# Patient Record
Sex: FEMALE | Race: BLACK OR AFRICAN AMERICAN | NOT HISPANIC OR LATINO | Employment: FULL TIME | URBAN - METROPOLITAN AREA
[De-identification: names, ages, dates, MRNs, and addresses within clinical notes are randomized per-mention and may not be internally consistent; named-entity substitution may affect disease eponyms.]

---

## 2017-08-02 ENCOUNTER — HOSPITAL ENCOUNTER (EMERGENCY)
Facility: HOSPITAL | Age: 27
Discharge: HOME | End: 2017-08-02
Attending: EMERGENCY MEDICINE
Payer: COMMERCIAL

## 2017-08-02 VITALS
TEMPERATURE: 98 F | DIASTOLIC BLOOD PRESSURE: 77 MMHG | HEIGHT: 67 IN | WEIGHT: 293 LBS | BODY MASS INDEX: 45.99 KG/M2 | RESPIRATION RATE: 18 BRPM | SYSTOLIC BLOOD PRESSURE: 118 MMHG | HEART RATE: 75 BPM | OXYGEN SATURATION: 100 %

## 2017-08-02 DIAGNOSIS — R20.2 PARESTHESIA: Primary | ICD-10-CM

## 2017-08-02 LAB
ALBUMIN SERPL BCP-MCNC: 3.4 G/DL
ALP SERPL-CCNC: 87 U/L
ALT SERPL W/O P-5'-P-CCNC: 16 U/L
ANION GAP SERPL CALC-SCNC: 8 MMOL/L
AST SERPL-CCNC: 22 U/L
B-HCG UR QL: NEGATIVE
BASOPHILS # BLD AUTO: 0.02 K/UL
BASOPHILS NFR BLD: 0.2 %
BILIRUB SERPL-MCNC: 0.3 MG/DL
BUN SERPL-MCNC: 9 MG/DL
CALCIUM SERPL-MCNC: 9.4 MG/DL
CHLORIDE SERPL-SCNC: 107 MMOL/L
CO2 SERPL-SCNC: 23 MMOL/L
CREAT SERPL-MCNC: 0.8 MG/DL
DIFFERENTIAL METHOD: ABNORMAL
EOSINOPHIL # BLD AUTO: 0.1 K/UL
EOSINOPHIL NFR BLD: 0.7 %
ERYTHROCYTE [DISTWIDTH] IN BLOOD BY AUTOMATED COUNT: 16.8 %
EST. GFR  (AFRICAN AMERICAN): >60 ML/MIN/1.73 M^2
EST. GFR  (NON AFRICAN AMERICAN): >60 ML/MIN/1.73 M^2
GLUCOSE SERPL-MCNC: 93 MG/DL
HCT VFR BLD AUTO: 37.4 %
HGB BLD-MCNC: 11.8 G/DL
LYMPHOCYTES # BLD AUTO: 4.6 K/UL
LYMPHOCYTES NFR BLD: 37.2 %
MCH RBC QN AUTO: 22.4 PG
MCHC RBC AUTO-ENTMCNC: 31.6 G/DL
MCV RBC AUTO: 71 FL
MONOCYTES # BLD AUTO: 0.5 K/UL
MONOCYTES NFR BLD: 4.3 %
NEUTROPHILS # BLD AUTO: 7.2 K/UL
NEUTROPHILS NFR BLD: 57.6 %
PLATELET # BLD AUTO: 501 K/UL
PMV BLD AUTO: 9.7 FL
POTASSIUM SERPL-SCNC: 4.2 MMOL/L
PROT SERPL-MCNC: 7.6 G/DL
RBC # BLD AUTO: 5.27 M/UL
SODIUM SERPL-SCNC: 138 MMOL/L
WBC # BLD AUTO: 12.46 K/UL

## 2017-08-02 PROCEDURE — 85025 COMPLETE CBC W/AUTO DIFF WBC: CPT

## 2017-08-02 PROCEDURE — 99284 EMERGENCY DEPT VISIT MOD MDM: CPT

## 2017-08-02 PROCEDURE — 80053 COMPREHEN METABOLIC PANEL: CPT

## 2017-08-02 PROCEDURE — 81025 URINE PREGNANCY TEST: CPT

## 2017-08-02 NOTE — ED PROVIDER NOTES
Encounter Date: 8/2/2017       History     Chief Complaint   Patient presents with    Dizziness     patient feeling dizziness and right side weak     26 year old female with complaint of dizziness yesterday with numbness to right side of body X 2 days.  No weakness.  No fever or chills.  Reports decreased sensation.  NO headache.  No neck pain.  Mild dizziness at present.            Review of patient's allergies indicates:  No Known Allergies  Past Medical History:   Diagnosis Date    Anemia      History reviewed. No pertinent surgical history.  Family History   Problem Relation Age of Onset    Hypertension Father      Social History   Substance Use Topics    Smoking status: Never Smoker    Smokeless tobacco: Never Used    Alcohol use No     Review of Systems   Constitutional: Negative for fever.   HENT: Negative for sore throat.    Respiratory: Negative for shortness of breath.    Cardiovascular: Negative for chest pain.   Gastrointestinal: Negative for nausea.   Genitourinary: Negative for dysuria.   Musculoskeletal: Negative for back pain.   Skin: Negative for rash.   Neurological: Positive for dizziness and numbness. Negative for weakness.   Hematological: Does not bruise/bleed easily.       Physical Exam     Initial Vitals [08/02/17 1017]   BP Pulse Resp Temp SpO2   118/77 75 18 98.4 °F (36.9 °C) 100 %      MAP       90.67         Physical Exam    Nursing note and vitals reviewed.  Constitutional: She appears well-developed and well-nourished.   HENT:   Head: Normocephalic and atraumatic.   Eyes: Conjunctivae and EOM are normal. Pupils are equal, round, and reactive to light.   Neck: Normal range of motion. Neck supple.   Cardiovascular: Normal rate, regular rhythm, normal heart sounds and intact distal pulses.   Pulmonary/Chest: Breath sounds normal.   Abdominal: Soft. There is no tenderness. There is no rebound and no guarding.   Musculoskeletal: Normal range of motion.   Neurological: She is alert and  oriented to person, place, and time. She has normal strength and normal reflexes.   Skin: Skin is warm and dry.   Psychiatric: She has a normal mood and affect. Her behavior is normal. Thought content normal.         ED Course   Procedures  Labs Reviewed   CBC W/ AUTO DIFFERENTIAL - Abnormal; Notable for the following:        Result Value    Hemoglobin 11.8 (*)     MCV 71 (*)     MCH 22.4 (*)     MCHC 31.6 (*)     RDW 16.8 (*)     Platelets 501 (*)     All other components within normal limits   COMPREHENSIVE METABOLIC PANEL - Abnormal; Notable for the following:     Albumin 3.4 (*)     All other components within normal limits   PREGNANCY TEST, URINE RAPID                               ED Course     Clinical Impression:   The encounter diagnosis was Paresthesia.                           Jose Ervin NP  08/02/17 [id number]

## 2017-08-03 ENCOUNTER — HOSPITAL ENCOUNTER (OUTPATIENT)
Dept: RADIOLOGY | Facility: HOSPITAL | Age: 27
Discharge: HOME | End: 2017-08-03
Attending: NURSE PRACTITIONER
Payer: COMMERCIAL

## 2017-08-03 ENCOUNTER — TELEPHONE (OUTPATIENT)
Dept: INTERNAL MEDICINE | Facility: CLINIC | Age: 27
End: 2017-08-03

## 2017-08-03 ENCOUNTER — OFFICE VISIT (OUTPATIENT)
Dept: INTERNAL MEDICINE | Facility: CLINIC | Age: 27
End: 2017-08-03
Payer: COMMERCIAL

## 2017-08-03 ENCOUNTER — TELEPHONE (OUTPATIENT)
Dept: NEUROLOGY | Facility: CLINIC | Age: 27
End: 2017-08-03

## 2017-08-03 VITALS
TEMPERATURE: 97 F | DIASTOLIC BLOOD PRESSURE: 96 MMHG | HEIGHT: 67 IN | WEIGHT: 293 LBS | SYSTOLIC BLOOD PRESSURE: 143 MMHG | HEART RATE: 98 BPM | BODY MASS INDEX: 45.99 KG/M2

## 2017-08-03 DIAGNOSIS — R20.2 PARESTHESIAS: Primary | ICD-10-CM

## 2017-08-03 DIAGNOSIS — R20.2 PARESTHESIAS: ICD-10-CM

## 2017-08-03 PROCEDURE — 72100 X-RAY EXAM L-S SPINE 2/3 VWS: CPT | Mod: 26,,, | Performed by: RADIOLOGY

## 2017-08-03 PROCEDURE — 99999 PR PBB SHADOW E&M-EST. PATIENT-LVL V: CPT | Mod: PBBFAC,,, | Performed by: NURSE PRACTITIONER

## 2017-08-03 PROCEDURE — 99214 OFFICE O/P EST MOD 30 MIN: CPT | Mod: S$GLB,,, | Performed by: NURSE PRACTITIONER

## 2017-08-03 PROCEDURE — 72100 X-RAY EXAM L-S SPINE 2/3 VWS: CPT | Mod: TC,PO

## 2017-08-03 PROCEDURE — 3008F BODY MASS INDEX DOCD: CPT | Mod: S$GLB,,, | Performed by: NURSE PRACTITIONER

## 2017-08-03 PROCEDURE — 72040 X-RAY EXAM NECK SPINE 2-3 VW: CPT | Mod: TC,PO

## 2017-08-03 PROCEDURE — 72040 X-RAY EXAM NECK SPINE 2-3 VW: CPT | Mod: 26,,, | Performed by: RADIOLOGY

## 2017-08-03 PROCEDURE — 72070 X-RAY EXAM THORAC SPINE 2VWS: CPT | Mod: TC,PO

## 2017-08-03 PROCEDURE — 72070 X-RAY EXAM THORAC SPINE 2VWS: CPT | Mod: 26,,, | Performed by: RADIOLOGY

## 2017-08-03 NOTE — TELEPHONE ENCOUNTER
----- Message from Geena Arizmendi sent at 8/3/2017  9:17 AM CDT -----  Contact: Pt   Pt called and stated she was returning a call to the nurse. She can be reached at    [phone].  Thanks,  TF

## 2017-08-03 NOTE — TELEPHONE ENCOUNTER
----- Message from Fany Stone sent at 8/3/2017 12:52 PM CDT -----  Contact: self [phone]  States that she is returning call please call back at [phone]//thank you acc

## 2017-08-03 NOTE — TELEPHONE ENCOUNTER
----- Message from Bridgette Campos sent at 8/3/2017  6:32 AM CDT -----  Contact: self  Patient states went to ER on yesterday experiencing numbness on right side of body.  Patient states unable to feel much. Patient states was advised to see Neurology.   Patient need appointment for today.   Please call pt at [phone]

## 2017-08-03 NOTE — PROGRESS NOTES
Subjective:       Patient ID: Kendy Ambrocio is a 26 y.o. female.    Chief Complaint: Numbness (rt. side of body)    ER follow up Paresthesia right side      Started Tuesday upon awakening. Woke up and entire ride side of body felt like it was asleep. Fell twice since onset of symptoms. Intermittent numbness and tingling. No blurred vision, headache, hx of neuromuscular diseases, heart disease, stroke, acute head, neck or spine issues, fever, sweats, chills, abd pain, n/v/d, rashes, joint pain, urinary symptoms, slurred speech, confusion or any other acute symptoms. CT of head in ER was normal. Labs indicate mild anemia. Not currently taking iron. No new medications. She has to concentrate hard when walking not to fall and to walk in a straight line due to numbness.           Review of Systems   Constitutional: Negative for activity change, appetite change, chills, fatigue, fever and unexpected weight change.   Respiratory: Negative for apnea, cough, choking, chest tightness, shortness of breath, wheezing and stridor.    Cardiovascular: Negative for chest pain, palpitations and leg swelling.   Musculoskeletal: Negative for arthralgias, back pain, gait problem, joint swelling, myalgias, neck pain and neck stiffness.   Neurological: Positive for numbness (right side of body). Negative for dizziness, tremors, seizures, syncope, facial asymmetry, speech difficulty, weakness, light-headedness and headaches.   All other systems reviewed and are negative.      Objective:      Physical Exam   Constitutional:   Obese    Cardiovascular: Normal rate, regular rhythm, normal heart sounds and intact distal pulses.    Pulmonary/Chest: Effort normal and breath sounds normal.   Abdominal: Soft. Bowel sounds are normal.   Musculoskeletal: Normal range of motion. She exhibits no edema, tenderness or deformity.   Difficulty walking from door to chair. But upper and lower ext 5 plus strength when tested independently     Neurological: She is alert. She has normal strength. No cranial nerve deficit or sensory deficit. She displays a negative Romberg sign. GCS eye subscore is 4. GCS verbal subscore is 5. GCS motor subscore is 6.   Skin: Skin is warm and dry.   Psychiatric: She has a normal mood and affect.       Assessment:       1. Paresthesias        Plan:   Paresthesias  Comments:  right sided   Orders:  -     Cancel: MRI Brain With Contrast; Future; Expected date: 08/03/2017  -     Ambulatory consult to Neurology  -     X-Ray Cervical Spine AP And Lateral; Future; Expected date: 08/03/2017  -     X-Ray Thoracic Spine AP Lateral; Future; Expected date: 08/03/2017  -     X-Ray Lumbar Spine Ap And Lateral; Future; Expected date: 08/03/2017  -     Ambulatory consult to Neurology      As above  Refer to neuro  MRI   ER for any acute changes

## 2017-08-03 NOTE — TELEPHONE ENCOUNTER
Called to inform patient of no opening on today.  Needs to speak with patient prior to scheduling with neurology.  Nothing available with them until 9/07/2017

## 2017-08-03 NOTE — TELEPHONE ENCOUNTER
----- Message from Bridgette Campos sent at 8/3/2017  6:32 AM CDT -----  Contact: self  Patient states went to ER on yesterday experiencing numbness on right side of body.  Patient states unable to feel much. Patient states was advised to see Neurology.   Patient need appointment for today.   Please call pt at [phone]

## 2017-08-04 ENCOUNTER — OFFICE VISIT (OUTPATIENT)
Dept: NEUROLOGY | Facility: CLINIC | Age: 27
End: 2017-08-04
Payer: COMMERCIAL

## 2017-08-04 ENCOUNTER — PATIENT MESSAGE (OUTPATIENT)
Dept: NEUROLOGY | Facility: CLINIC | Age: 27
End: 2017-08-04

## 2017-08-04 VITALS
DIASTOLIC BLOOD PRESSURE: 90 MMHG | BODY MASS INDEX: 45.99 KG/M2 | HEIGHT: 67 IN | WEIGHT: 293 LBS | HEART RATE: 80 BPM | SYSTOLIC BLOOD PRESSURE: 128 MMHG

## 2017-08-04 DIAGNOSIS — R20.0 NUMBNESS ON RIGHT SIDE: Primary | ICD-10-CM

## 2017-08-04 DIAGNOSIS — E66.01 MORBID OBESITY WITH BMI OF 50.0-59.9, ADULT: ICD-10-CM

## 2017-08-04 PROCEDURE — 99205 OFFICE O/P NEW HI 60 MIN: CPT | Mod: S$GLB,,, | Performed by: PSYCHIATRY & NEUROLOGY

## 2017-08-04 PROCEDURE — 99999 PR PBB SHADOW E&M-EST. PATIENT-LVL III: CPT | Mod: PBBFAC,,, | Performed by: PSYCHIATRY & NEUROLOGY

## 2017-08-04 PROCEDURE — 3008F BODY MASS INDEX DOCD: CPT | Mod: S$GLB,,, | Performed by: PSYCHIATRY & NEUROLOGY

## 2017-08-04 NOTE — PROGRESS NOTES
This is a 26-year-old right-handed  who states that she awakened on Tuesday, August 1, 2017 with a sensation of numbness that involves primarily the entire right side sparing the face and head.  The patient states that she had loss of feeling in the upper and lower extremity on the right side of the body.  She did not notice any obvious weakness of the right arm or right leg however.  She also denies any change in speech.  She was not aware of any change in vision, denying double vision, blurred vision, or loss of vision in one eye in one part of the visual field.    As the day went by however, she noticed that she was having difficulty with ambulation indicating that she could not walk in a straight line.  She then discovered that she could not drive her automobile because she could not feel her right foot for placement on the accelerator and brake.    The patient and her family did not notice any change in speech.  The patient denies any headache.  She denies any dysphagia.  However, the sensation of numbness has persisted.    The patient denies any prior transient neurological symptoms of any type.  She denies any prior history of vision abnormality, weakness, awkwardness, or clumsiness of the extremities.  She denies any prior change in walking or standing balance.  She denies any prior change in speech.  She denies any change in her bowel and bladder functions.  Patient does have an established history of morbid obesity.  The patient denies any prior history of hypertension or heart disease.      ROS:  GENERAL: No fever, chills, fatigability or unplanned weight loss.  However, the patient has been on a diet and has lost approximately 40 pounds.  SKIN: No rashes, itching or changes in color or texture of skin.  HEAD: No headaches or recent head trauma.  EYES: Visual acuity fine. No photophobia, ocular pain or diplopia.  EARS: Denies ear pain, discharge or vertigo.  NOSE: No loss of  smell, no epistaxis or postnasal drip.  MOUTH & THROAT: No hoarseness or change in voice. No excessive gum bleeding.  NODES: Denies swollen glands.  CHEST: Denies ANN, cyanosis, wheezing, cough and sputum production.  CARDIOVASCULAR: Denies chest pain, PND, orthopnea or reduced exercise tolerance.  ABDOMEN: Appetite fine. No weight loss. Denies diarrhea, abdominal pain, hematemesis or blood in stool.  URINARY: No flank pain, dysuria or hematuria.  PERIPHERAL VASCULAR: No claudication or cyanosis.  MUSCULOSKELETAL: No joint stiffness or swelling. Denies back pain.  NEUROLOGIC: No history of seizures, paralysis, alteration of gait or coordination.  See also present illness.    PAST HISTORY:  Surgery: No surgical procedures  Medical: Morbid obesity (BMI 57.39)  ALLERGIES: NO KNOWN DRUG ALLERGIES    FAMILY HISTORY:  The patient's father had a history of hypertension.  However there is no known family history of early onset stroke or heart disease.  She has a maternal great uncle who had multiple cerebral aneurysms.  A paternal grandmother had Alzheimer-like dementia.  The patient does not have any children of her own.    SOCIAL HISTORY:  The patient is  and lives with her .  She is a nonsmoker.  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    PE:   VITAL SIGNS: Blood pressure 160/100, pulse 80, weight 166.2 kg, height 5 foot 7 inches, BMI 57.39  APPEARANCE: Well nourished, well developed, in no acute distress.    HEAD: Normocephalic, atraumatic.  EYES: PERRL. EOMI.  Non-icteric sclerae.    EARS: TM's intact. Light reflex normal. No retraction or perforation.    NOSE: Mucosa pink. Airway clear.  MOUTH & THROAT: No tonsillar enlargement. No pharyngeal erythema or exudate. No stridor.  NECK: Supple. No bruits.  CHEST: Lungs clear to auscultation.  CARDIOVASCULAR: Regular rhythm without significant murmurs.  ABDOMEN: Bowel sounds normal. Not distended. Soft. No  tenderness or masses.  MUSCULOSKELETAL:  No bony deformity seen.  Muscle tone is normal.  Muscle mass is normal.  NEUROLOGIC:   Mental Status:  The patient is well oriented to person, time, place, and situation.  The patient is attentive to the environment and cooperative for the exam.  Cranial Nerves: II-XII grossly intact.  Visual acuity aided is 20/30 left eye, right eye, and both eyes.  The patient perceives the color red to be the same shade of red with each eye.  Light intensity is perceived to be the same with each eye.  Fundoscopic exam is normal.  No hemorrhage, exudate or papilledema is present.  Visual fields are intact by confrontation.  The extraocular muscles are intact in the cardinal directions of gaze.  No ptosis is present.  The patient reports intact perception of light touch and temperature in all 3 divisions of the fifth cranial nerve bilaterally.  Masseter function is equally strong.  Facial features are symmetrical.  Speech is normal in fluency, diction, and phrasing.  Tongue protrudes in the midline.    Gait and Station:  Romberg is negative.  Good alternate armswing with normal gait.  Motor:  No downdrift of either arm when held at shoulder level.  Manual muscle testing of proximal and distal muscles of both upper and lower extremities is normal.  No focal motor weakness is identified in either upper or either lower extremity.  Sensory: The patient reports subjectively decreased appreciation of temperature, monofilament testing, and pinprick in the right hand, fingers, arm, and lower extremity.  The patient states that it is just subjectively less sensation on the right than on the left.  This includes the major cervical as well as lumbar dermatomal distributions.  Cerebellar:  Finger to nose done well.  Alternating movements intact.  No involuntary movements or tremor seen.  Reflexes:  Stretch reflexes are 1+ both upper and lower extremities.  However, her morbid obesity may be interfering  with reflex determinations.  Plantar stimulation is flexor bilaterally and no pathological reflexes are seen        ASSESSMENT:  1.  Right-sided numbness, etiology unclear but with a very large differential diagnosis including stroke as well as demyelinating disease.  2.  Morbid obesity (BMI 57.39)  3.  Essential hypertension    RECOMMENDATIONS:  1.  Obtain MRI brain without and with contrast.  This study will be critical and if shows evidence of vascular disease, further diagnostic study will be recommended including carotid ultrasound as well as echocardiogram.  On the other hand, if the MRI brain is negative, she will need MRI of the cervical spine as well.  2.  The issue of her morbid obesity was addressed.  The patient is currently dieting with a low carbohydrate type of diet and has been successful in losing about 40 pounds.  The patient was encouraged to continue the low carbohydrate diet and to increase exercise activities.  3.  Further recommendations were made following completion of MRI brain    This note is generated with speech recognition software and is subject to transcription error and sound alike phrases that may be missed by proofreading.

## 2017-08-09 ENCOUNTER — OFFICE VISIT (OUTPATIENT)
Dept: INTERNAL MEDICINE | Facility: CLINIC | Age: 27
End: 2017-08-09
Payer: COMMERCIAL

## 2017-08-09 ENCOUNTER — TELEPHONE (OUTPATIENT)
Dept: INTERNAL MEDICINE | Facility: CLINIC | Age: 27
End: 2017-08-09

## 2017-08-09 ENCOUNTER — LAB VISIT (OUTPATIENT)
Dept: LAB | Facility: HOSPITAL | Age: 27
End: 2017-08-09
Attending: FAMILY MEDICINE
Payer: COMMERCIAL

## 2017-08-09 VITALS
HEIGHT: 67 IN | SYSTOLIC BLOOD PRESSURE: 136 MMHG | DIASTOLIC BLOOD PRESSURE: 78 MMHG | BODY MASS INDEX: 45.99 KG/M2 | TEMPERATURE: 97 F | WEIGHT: 293 LBS | HEART RATE: 89 BPM

## 2017-08-09 DIAGNOSIS — R20.0 NUMBNESS ON RIGHT SIDE: ICD-10-CM

## 2017-08-09 DIAGNOSIS — D64.9 ANEMIA, UNSPECIFIED TYPE: Primary | ICD-10-CM

## 2017-08-09 DIAGNOSIS — D64.9 ANEMIA, UNSPECIFIED TYPE: ICD-10-CM

## 2017-08-09 DIAGNOSIS — E66.01 MORBID OBESITY WITH BMI OF 50.0-59.9, ADULT: ICD-10-CM

## 2017-08-09 PROCEDURE — 3008F BODY MASS INDEX DOCD: CPT | Mod: S$GLB,,, | Performed by: FAMILY MEDICINE

## 2017-08-09 PROCEDURE — 82747 ASSAY OF FOLIC ACID RBC: CPT

## 2017-08-09 PROCEDURE — 82607 VITAMIN B-12: CPT

## 2017-08-09 PROCEDURE — 82728 ASSAY OF FERRITIN: CPT

## 2017-08-09 PROCEDURE — 83540 ASSAY OF IRON: CPT

## 2017-08-09 PROCEDURE — 99999 PR PBB SHADOW E&M-EST. PATIENT-LVL III: CPT | Mod: PBBFAC,,, | Performed by: FAMILY MEDICINE

## 2017-08-09 PROCEDURE — 36415 COLL VENOUS BLD VENIPUNCTURE: CPT | Mod: PO

## 2017-08-09 PROCEDURE — 99213 OFFICE O/P EST LOW 20 MIN: CPT | Mod: S$GLB,,, | Performed by: FAMILY MEDICINE

## 2017-08-09 NOTE — PROGRESS NOTES
Subjective:       Patient ID: Kendy Ambrocio is a 26 y.o. female.    Chief Complaint: Results    MRI:      Discussed with pt MRI results that were prompted after a very acute onset of right sided numbness. MRI demonstrated some signs of possible MS and had been discussed with neurology possible spinal tap      Review of Systems   Constitutional: Positive for activity change. Negative for unexpected weight change.   HENT: Negative for hearing loss, rhinorrhea and trouble swallowing.    Eyes: Negative for discharge and visual disturbance.   Respiratory: Negative for wheezing.    Cardiovascular: Negative for chest pain and palpitations.   Gastrointestinal: Negative for blood in stool, constipation, diarrhea and vomiting.   Endocrine: Negative for polydipsia and polyuria.   Genitourinary: Negative for difficulty urinating, dysuria, hematuria and menstrual problem.   Musculoskeletal: Negative for arthralgias, joint swelling and neck pain.   Neurological: Positive for numbness (right side). Negative for weakness and headaches.   Psychiatric/Behavioral: Negative for confusion and dysphoric mood.       Objective:      Physical Exam   Constitutional: She is oriented to person, place, and time. She appears well-developed and well-nourished.   Cardiovascular: Normal rate and regular rhythm.    Pulmonary/Chest: Effort normal and breath sounds normal.   Neurological: She is alert and oriented to person, place, and time. She has normal strength and normal reflexes. A sensory deficit (right upper and lower extremity) is present. She displays a negative Romberg sign.       Assessment:       1. Anemia, unspecified type    2. Numbness on right side    3. Morbid obesity with BMI of 50.0-59.9, adult        Plan:       Anemia, unspecified type  Comments:  Will do anemic work-up. Pt already take iron tabs. Included B12  Orders:  -     Iron and TIBC; Future; Expected date: 08/09/2017  -     Ferritin; Future; Expected date:  08/09/2017  -     Vitamin B12; Future; Expected date: 08/09/2017  -     Folate RBC; Future; Expected date: 08/09/2017    Numbness on right side  Comments:  Pt is awaiting work form Neurology    Morbid obesity with BMI of 50.0-59.9, adult  Comments:  Discussed with pt about increase exericse and better eating habits

## 2017-08-10 ENCOUNTER — TELEPHONE (OUTPATIENT)
Dept: NEUROLOGY | Facility: CLINIC | Age: 27
End: 2017-08-10

## 2017-08-10 DIAGNOSIS — R20.0 NUMBNESS ON RIGHT SIDE: Primary | ICD-10-CM

## 2017-08-10 LAB
FERRITIN SERPL-MCNC: 25 NG/ML
IRON SERPL-MCNC: 24 UG/DL
SATURATED IRON: 5 %
TOTAL IRON BINDING CAPACITY: 440 UG/DL
TRANSFERRIN SERPL-MCNC: 297 MG/DL
VIT B12 SERPL-MCNC: 618 PG/ML

## 2017-08-10 NOTE — TELEPHONE ENCOUNTER
----- Message from Elizabeth Ward MA sent at 8/7/2017  4:38 PM CDT -----  Contact: pt  Please place orders for pts MRI and spinal tap/thanks  ----- Message -----  From: Pam Rooney  Sent: 8/7/2017   4:26 PM  To: [facility] Staff    States she needs and order to schedule an MRI for her spine and a spinal tap. Please call pt at [phone]. Thank you

## 2017-08-14 LAB — FOLATE RBC-MCNC: 387 NG/ML

## 2017-08-31 ENCOUNTER — PATIENT MESSAGE (OUTPATIENT)
Dept: NEUROLOGY | Facility: CLINIC | Age: 27
End: 2017-08-31

## 2017-08-31 DIAGNOSIS — G35 MULTIPLE SCLEROSIS: Primary | ICD-10-CM

## 2017-09-01 ENCOUNTER — HOSPITAL ENCOUNTER (OUTPATIENT)
Dept: RADIOLOGY | Facility: HOSPITAL | Age: 27
Discharge: HOME | End: 2017-09-01
Attending: PSYCHIATRY & NEUROLOGY
Payer: COMMERCIAL

## 2017-09-01 DIAGNOSIS — R20.0 NUMBNESS ON RIGHT SIDE: ICD-10-CM

## 2017-09-01 LAB
CLARITY CSF: CLEAR
COLOR CSF: COLORLESS
PROT CSF-MCNC: 21 MG/DL
RBC # CSF: 54 /CU MM
SPECIMEN VOL CSF: 5 ML
WBC # CSF: 4 /CU MM

## 2017-09-01 PROCEDURE — 87205 SMEAR GRAM STAIN: CPT

## 2017-09-01 PROCEDURE — 89051 BODY FLUID CELL COUNT: CPT

## 2017-09-01 PROCEDURE — 84157 ASSAY OF PROTEIN OTHER: CPT

## 2017-09-01 PROCEDURE — 62270 DX LMBR SPI PNXR: CPT

## 2017-09-01 PROCEDURE — 83873 ASSAY OF CSF PROTEIN: CPT

## 2017-09-01 PROCEDURE — 87070 CULTURE OTHR SPECIMN AEROBIC: CPT

## 2017-09-01 PROCEDURE — 83916 OLIGOCLONAL BANDS: CPT

## 2017-09-01 NOTE — DISCHARGE SUMMARY
Sterile technique was performed in the lower back, lidocaine was used as a local anesthetic.  OP 9 cm of h2o, 11 cc's of clear csf to lab for eval.  Pt tolerated the procedure well without immediate complications.  Please see radiologist report for details. F/u with PCP and/or ordering physician.

## 2017-09-01 NOTE — TELEPHONE ENCOUNTER
"DR. Monteiro pt is having to cx her MRI today due to weight limit on the machines at the hospital/She will need a OPEN MRI so if you change orders and print them I can send them over to [facility] [address]/ and I"m not sure about the  Spinal tap, I'm sure they can still do that at the hospital right? Pt had asked me? /please advise  "

## 2017-09-05 LAB
ALBUMIN CSF-MCNC: 7.8 MG/DL
ALBUMIN SERPL-MCNC: 3940 MG/DL
IGG CSF-MCNC: 3.9 MG/DL
IGG SERPL-MCNC: 1280 MG/DL
IGG SYNTH RATE SER+CSF CALC-MRATE: 11.57 MG/24 H
IGG/ALB CLEAR SER+CSF-RTO: 1.56
IGG/ALB CSF: 0.5 {RATIO}
IGG/ALB SER: 0.32 {RATIO}
OLIGOCLONAL BANDS CSF ELPH-IMP: 12 BANDS
OLIGOCLONAL BANDS CSF ELPH-IMP: 14 BANDS
OLIGOCLONAL BANDS SERPL: 2 BANDS

## 2017-09-06 ENCOUNTER — OFFICE VISIT (OUTPATIENT)
Dept: NEUROLOGY | Facility: CLINIC | Age: 27
End: 2017-09-06
Payer: COMMERCIAL

## 2017-09-06 VITALS
HEART RATE: 80 BPM | BODY MASS INDEX: 45.99 KG/M2 | WEIGHT: 293 LBS | HEIGHT: 67 IN | DIASTOLIC BLOOD PRESSURE: 82 MMHG | SYSTOLIC BLOOD PRESSURE: 140 MMHG

## 2017-09-06 DIAGNOSIS — G35 MULTIPLE SCLEROSIS: ICD-10-CM

## 2017-09-06 LAB
CMV SPEC QL SHELL VIAL CULT: NO GROWTH
GRAM STN SPEC: NORMAL

## 2017-09-06 PROCEDURE — 99214 OFFICE O/P EST MOD 30 MIN: CPT | Mod: S$GLB,,, | Performed by: PSYCHIATRY & NEUROLOGY

## 2017-09-06 PROCEDURE — 3008F BODY MASS INDEX DOCD: CPT | Mod: S$GLB,,, | Performed by: PSYCHIATRY & NEUROLOGY

## 2017-09-06 PROCEDURE — 99999 PR PBB SHADOW E&M-EST. PATIENT-LVL III: CPT | Mod: PBBFAC,,, | Performed by: PSYCHIATRY & NEUROLOGY

## 2017-09-06 NOTE — PROGRESS NOTES
This is a 26-year-old right-handed  who now has gone through diagnostic testing with her MRI of the brain, and cervical spinal cord showing evidence of demyelinating lesions.  She has had lumbar puncture which is distinctly abnormal with 12 oligoclonal bands and increased IgG synthesis.  It is felt that the comminution of her history as well as laboratory findings are consistent with the diagnosis of relapsing/remitting multiple sclerosis.    The patient returns to clinic today to review the diagnostic studies and to review therapeutic choices.  Discussion was held with the patient and her mother regarding the different modalities of treatment including various immunomodulating therapies.  We discussed oral as well as injectable immunomodulating treatment.  Printed material was also was admitted to the patient or her review, and she was referred to the National Multiple Sclerosis [employer] website also.    The patient had a series of questions that she presented and these were answered in detail to the patient's satisfaction.  The patient indicates that she will review the material and make a decision regarding the immunomodulating treatment she wishes to begin.  The patient also asked about the possibility of a second opinion and this was offered to her through the [facility] system.  Patient states that she will think about this also.    This was a 40 minute visit with the patient and her mother with 100% of the time spent in discussion regarding the laboratory studies as well as discussion regarding immunomodulating therapy.  No formal examination was done at this time.  The patient indicates that she will notify us when she has made a decision about medication and whether she wants a second opinion.    This note is generated with speech recognition software and is subject to transcription error and sound alike phrases that may be missed by proofreading.

## 2017-09-07 ENCOUNTER — TELEPHONE (OUTPATIENT)
Dept: NEUROLOGY | Facility: CLINIC | Age: 27
End: 2017-09-07

## 2017-09-07 ENCOUNTER — PATIENT MESSAGE (OUTPATIENT)
Dept: NEUROLOGY | Facility: CLINIC | Age: 27
End: 2017-09-07

## 2017-09-07 NOTE — TELEPHONE ENCOUNTER
Call returned to pt. Informed her that records will be reviewed by Dr Juan David calle. Should be able to call her next week regarding setting up an appt.

## 2017-09-07 NOTE — TELEPHONE ENCOUNTER
----- Message from Lori Ralph sent at 9/6/2017  6:27 PM CDT -----  Contact: self  Pt is calling in regards to scheduling an appt. Pt was ref by Dr. Thierry Monteiro.     Pt would like a call back at [phone] to schedule this appt.    Thank you.

## 2017-09-08 ENCOUNTER — PATIENT MESSAGE (OUTPATIENT)
Dept: NEUROLOGY | Facility: CLINIC | Age: 27
End: 2017-09-08

## 2017-09-08 RX ORDER — DIMETHYL FUMARATE 120-240 MG
1 KIT ORAL 2 TIMES DAILY
Qty: 60 CAPSULE | Refills: 0 | Status: SHIPPED | OUTPATIENT
Start: 2017-09-08 | End: 2018-02-14

## 2017-09-08 NOTE — TELEPHONE ENCOUNTER
Pt is ready to start tecfidera. I will send her the paper work to sign and get her started/ please advise/

## 2017-09-11 ENCOUNTER — TELEPHONE (OUTPATIENT)
Dept: PHARMACY | Facility: CLINIC | Age: 27
End: 2017-09-11

## 2017-09-11 LAB — MBP CSF-MCNC: <2 MCG/L

## 2017-09-12 ENCOUNTER — TELEPHONE (OUTPATIENT)
Dept: NEUROLOGY | Facility: CLINIC | Age: 27
End: 2017-09-12

## 2017-09-12 NOTE — TELEPHONE ENCOUNTER
----- Message from Lynne Mcintyre sent at 9/12/2017  3:20 PM CDT -----  Contact: Patient [phone]  Patient is returning Amanda's call to schedule an appt. Please call

## 2018-02-14 ENCOUNTER — OFFICE VISIT (OUTPATIENT)
Dept: NEUROLOGY | Facility: CLINIC | Age: 28
End: 2018-02-14
Payer: COMMERCIAL

## 2018-02-14 ENCOUNTER — DOCUMENTATION ONLY (OUTPATIENT)
Dept: NEUROLOGY | Facility: CLINIC | Age: 28
End: 2018-02-14

## 2018-02-14 ENCOUNTER — LAB VISIT (OUTPATIENT)
Dept: LAB | Facility: HOSPITAL | Age: 28
End: 2018-02-14
Payer: COMMERCIAL

## 2018-02-14 VITALS
HEIGHT: 67 IN | BODY MASS INDEX: 45.99 KG/M2 | DIASTOLIC BLOOD PRESSURE: 97 MMHG | SYSTOLIC BLOOD PRESSURE: 160 MMHG | WEIGHT: 293 LBS | HEART RATE: 89 BPM

## 2018-02-14 DIAGNOSIS — R20.9 SENSORY DISTURBANCE: ICD-10-CM

## 2018-02-14 DIAGNOSIS — E55.9 VITAMIN D INSUFFICIENCY: ICD-10-CM

## 2018-02-14 DIAGNOSIS — G35 MULTIPLE SCLEROSIS: Primary | ICD-10-CM

## 2018-02-14 DIAGNOSIS — G35 MULTIPLE SCLEROSIS: ICD-10-CM

## 2018-02-14 DIAGNOSIS — R90.89 ABNORMAL BRAIN MRI: ICD-10-CM

## 2018-02-14 DIAGNOSIS — Z71.89 COUNSELING REGARDING GOALS OF CARE: ICD-10-CM

## 2018-02-14 DIAGNOSIS — E66.01 MORBID OBESITY WITH BMI OF 50.0-59.9, ADULT: ICD-10-CM

## 2018-02-14 LAB
25(OH)D3+25(OH)D2 SERPL-MCNC: 26 NG/ML
ALBUMIN SERPL BCP-MCNC: 3.2 G/DL
ALP SERPL-CCNC: 83 U/L
ALT SERPL W/O P-5'-P-CCNC: 13 U/L
ANION GAP SERPL CALC-SCNC: 9 MMOL/L
AST SERPL-CCNC: 20 U/L
BASOPHILS # BLD AUTO: 0.06 K/UL
BASOPHILS NFR BLD: 0.5 %
BILIRUB SERPL-MCNC: 0.2 MG/DL
BUN SERPL-MCNC: 10 MG/DL
CALCIUM SERPL-MCNC: 8.6 MG/DL
CHLORIDE SERPL-SCNC: 107 MMOL/L
CO2 SERPL-SCNC: 21 MMOL/L
CREAT SERPL-MCNC: 0.8 MG/DL
DIFFERENTIAL METHOD: ABNORMAL
EOSINOPHIL # BLD AUTO: 0.3 K/UL
EOSINOPHIL NFR BLD: 2.1 %
ERYTHROCYTE [DISTWIDTH] IN BLOOD BY AUTOMATED COUNT: 18.1 %
EST. GFR  (AFRICAN AMERICAN): >60 ML/MIN/1.73 M^2
EST. GFR  (NON AFRICAN AMERICAN): >60 ML/MIN/1.73 M^2
GLUCOSE SERPL-MCNC: 82 MG/DL
HCT VFR BLD AUTO: 35.4 %
HGB BLD-MCNC: 10.6 G/DL
IMM GRANULOCYTES # BLD AUTO: 0.04 K/UL
IMM GRANULOCYTES NFR BLD AUTO: 0.3 %
LYMPHOCYTES # BLD AUTO: 4.4 K/UL
LYMPHOCYTES NFR BLD: 34.7 %
MCH RBC QN AUTO: 21.5 PG
MCHC RBC AUTO-ENTMCNC: 29.9 G/DL
MCV RBC AUTO: 72 FL
MONOCYTES # BLD AUTO: 0.6 K/UL
MONOCYTES NFR BLD: 5 %
NEUTROPHILS # BLD AUTO: 7.2 K/UL
NEUTROPHILS NFR BLD: 57.4 %
NRBC BLD-RTO: 0 /100 WBC
PLATELET # BLD AUTO: 465 K/UL
PMV BLD AUTO: 10.1 FL
POTASSIUM SERPL-SCNC: 4 MMOL/L
PROT SERPL-MCNC: 7.2 G/DL
RBC # BLD AUTO: 4.93 M/UL
RHEUMATOID FACT SERPL-ACNC: <10 IU/ML
SODIUM SERPL-SCNC: 137 MMOL/L
WBC # BLD AUTO: 12.58 K/UL

## 2018-02-14 PROCEDURE — 99999 PR PBB SHADOW E&M-EST. PATIENT-LVL III: CPT | Mod: PBBFAC,,, | Performed by: PHYSICIAN ASSISTANT

## 2018-02-14 PROCEDURE — 86360 T CELL ABSOLUTE COUNT/RATIO: CPT

## 2018-02-14 PROCEDURE — 82164 ANGIOTENSIN I ENZYME TEST: CPT

## 2018-02-14 PROCEDURE — 86235 NUCLEAR ANTIGEN ANTIBODY: CPT | Mod: 59

## 2018-02-14 PROCEDURE — 85025 COMPLETE CBC W/AUTO DIFF WBC: CPT

## 2018-02-14 PROCEDURE — 86359 T CELLS TOTAL COUNT: CPT

## 2018-02-14 PROCEDURE — 86431 RHEUMATOID FACTOR QUANT: CPT

## 2018-02-14 PROCEDURE — 99215 OFFICE O/P EST HI 40 MIN: CPT | Mod: S$GLB,,, | Performed by: PHYSICIAN ASSISTANT

## 2018-02-14 PROCEDURE — 86225 DNA ANTIBODY NATIVE: CPT

## 2018-02-14 PROCEDURE — 86255 FLUORESCENT ANTIBODY SCREEN: CPT

## 2018-02-14 PROCEDURE — 86235 NUCLEAR ANTIGEN ANTIBODY: CPT

## 2018-02-14 PROCEDURE — 86618 LYME DISEASE ANTIBODY: CPT

## 2018-02-14 PROCEDURE — 80053 COMPREHEN METABOLIC PANEL: CPT

## 2018-02-14 PROCEDURE — 86147 CARDIOLIPIN ANTIBODY EA IG: CPT

## 2018-02-14 PROCEDURE — 86038 ANTINUCLEAR ANTIBODIES: CPT

## 2018-02-14 PROCEDURE — 99354 PR PROLONGED SVC, OUPT, 1ST HR: CPT | Mod: S$GLB,,, | Performed by: PHYSICIAN ASSISTANT

## 2018-02-14 PROCEDURE — 82306 VITAMIN D 25 HYDROXY: CPT

## 2018-02-14 RX ORDER — CHOLECALCIFEROL (VITAMIN D3) 25 MCG
2000 TABLET ORAL DAILY
COMMUNITY
End: 2019-01-18

## 2018-02-14 RX ORDER — ACETAMINOPHEN AND PHENYLEPHRINE HCL 325; 5 MG/1; MG/1
TABLET ORAL
COMMUNITY

## 2018-02-14 NOTE — PROGRESS NOTES
Fax sent to [facility] medical records dept (attn: Samir) with request for MRI brain on CD ([phone]).

## 2018-02-14 NOTE — PROGRESS NOTES
Neurology Consultation    Reason for consult:  Newly diagnosed MS(8/2017). History provided by patient and chart review(Dr. Monteiro)           History of present illness:       Per Dr. Monteiro:  This is a 26-year-old right-handed  who states that she awakened on Tuesday, August 1, 2017 with a sensation of numbness that involves primarily the entire right side sparing the face and head.  The patient states that she had loss of feeling in the upper and lower extremity on the right side of the body. Also, c/o signficant fatigue.  She did not notice any obvious weakness of the right arm or right leg , she did fall twice however.  She also denies any change in speech.  She was not aware of any change in vision, denying double vision, blurred vision, or loss of vision in one eye in one part of the visual field. Went to ER day after the symptoms started-x-rays and  negative. Sent to see neurologist and Saw Dr. Monteiro and PCP. PCP ordered MRI and saw Dr. Monteiro afterward.     Symptoms last 2 1/2 weeks and slowly resolved. She did not receive any rescue therapy. MRI of Brain was abnormal, LP done and positive MS panel    Patient states that in June of the same year she had L forearm numbness that lasted slightly less than a week.     She received Tecfidera but did not start it.   She has decided to try holistic diet. She has lost about 40 pounds so far   They are considering having a child, and she is concerned about taking medications.     Currently [place] D[place]2,[place]IU[place] and Biotin (10,000 mcg[place] and alpha lopoic acid(Buna).     Will occasionally get menstrual headaches    Review of systems:   CONSTITUTIONAL: Positive for purposeful weight loss, No fever, chills, weakness or fatigue.   HEENT: Eyes: No visual loss, blurred vision, double vision or yellow sclerae. Ears, Nose, Throat: No hearing loss, sneezing, congestion, runny nose or sore throat.   SKIN: No   "History of rashes   CARDIOVASCULAR: No chest pain, chest pressure or chest discomfort. No palpitations or edema.   GENITOURINARY: No dysuria. No sense or urgency/frequency  NEUROLOGICAL: As in HPI   MUSCULOSKELETAL: No muscle, back pain, joint pain or stiffness. Very seldom R calf spasm  HEMATOLOGIC: No anemia, bleeding or bruising.   PSYCHIATRIC: No history of depression or anxiety. Never been on mood medication. States the only time she had counseling was in high school when parent .      Past Medical History:   Diagnosis Date    Anemia        No past surgical history on file.    Family History   Problem Relation Age of Onset    Hypertension Father     No Known Problems Mother        Social History     Social History    Marital status:      Spouse name: N/A    Number of children: N/A    Years of education: N/A     Social History Main Topics    Smoking status: Never Smoker    Smokeless tobacco: Never Used    Alcohol use No    Drug use: No    Sexual activity: Yes     Partners: Male     Other Topics Concern    Not on file     Social History Narrative    No narrative on file       Scheduled Meds:  Continuous Infusions:  PRN Meds:.    Review of patient's allergies indicates:  No Known Allergies      Physical Exam    Vitals:    02/14/18 1049   BP: (!) 160/97   Pulse: 89   Weight: (!) 166.9 kg (368 lb)   Height: 5' 7" (1.702 m)       Timed walk: 3.9s today without assist    MENTAL STATUS: language is fluent, normal verbal comprehension, short-term and remote memory is intact, attention is normal, patient is alert and oriented x 3, fund of knowlege is appropriate by vocabulary.     CRANIAL NERVE EXAM:  There is no ELEONORA.  Extraocular muscles are intact. Pupils are equal, round, and reactive to light. No facial asymmetry. Facial sensation is intact bilaterally. There is no dysarthria. Uvula is midline, and palate moves symmetrically. Shoulder shrug intact bilaterlly. Tongue protrusion is midline. " Hearing is grossly intact. Neck is supple. Acuity corrected 20/20 OD and OS    MOTOR EXAM: Normal bulk and tone throughout UE and LE bilaterally.   No pronator drift; rapid sequential movements are normal; Strength is  5/5 in all groups in the lower extremities and upper extremities;    REFLEXES: 2+ and symmetric throughout UE's, 1+ LE's; toes are down bilaterally. Negative Hsieh's. Negative L'hermittes.     SENSORY EXAM: Normal to light touch, very mild decrease in vibration sense in bilateral LE's o/w normal    COORDINATION: Normal finger-to-nose exam     GAIT: Narrow based and stable    IMAGING (personally reviewed):   No images to view today-patient did not bring disc  Brain MRI report in EPIC:  Findings:    There is motion throughout the study, however important information is obtained    No acute infarct, hemorrhage or mass effect. Pericallosal flair hyperintensities are suggestive of a demyelinating process.  There is subtle enhancement of the largest area in the left parietal lobe, reference series 14 image 20. Ventricles and sulci are proportionate.     No abnormal extra-axial fluid collections.     Flow voids are maintained in the intra-cranial arteries and dural venous sinuses.    Skull base and calvarium have a normal signal.   Impression         Findings is compatible with a demyelinating process such as multiple sclerosis with subtle enhancement of the largest lesion in the left parietal lobe suggestive of active demyelinization      Electronically signed by: ERIC NGUYEN MD  Date: 08/04/17  Time: 13:40          LABS:  Lab Results   Component Value Date    WBC 12.58 02/14/2018    HGB 10.6 (L) 02/14/2018    HCT 35.4 (L) 02/14/2018    MCV 72 (L) 02/14/2018     (H) 02/14/2018     CMP  Sodium   Date Value Ref Range Status   02/14/2018 137 136 - 145 mmol/L Final     Potassium   Date Value Ref Range Status   02/14/2018 4.0 3.5 - 5.1 mmol/L Final     Chloride   Date Value Ref Range Status    02/14/2018 107 95 - 110 mmol/L Final     CO2   Date Value Ref Range Status   02/14/2018 21 (L) 23 - 29 mmol/L Final     Glucose   Date Value Ref Range Status   02/14/2018 82 70 - 110 mg/dL Final     BUN, Bld   Date Value Ref Range Status   02/14/2018 10 6 - 20 mg/dL Final     Creatinine   Date Value Ref Range Status   02/14/2018 0.8 0.5 - 1.4 mg/dL Final     Calcium   Date Value Ref Range Status   02/14/2018 8.6 (L) 8.7 - 10.5 mg/dL Final     Total Protein   Date Value Ref Range Status   02/14/2018 7.2 6.0 - 8.4 g/dL Final     Albumin   Date Value Ref Range Status   02/14/2018 3.2 (L) 3.5 - 5.2 g/dL Final     Total Bilirubin   Date Value Ref Range Status   02/14/2018 0.2 0.1 - 1.0 mg/dL Final     Comment:     For infants and newborns, interpretation of results should be based  on gestational age, weight and in agreement with clinical  observations.  Premature Infant recommended reference ranges:  Up to 24 hours.............<8.0 mg/dL  Up to 48 hours............<12.0 mg/dL  3-5 days..................<15.0 mg/dL  6-29 days.................<15.0 mg/dL       Alkaline Phosphatase   Date Value Ref Range Status   02/14/2018 83 55 - 135 U/L Final     AST   Date Value Ref Range Status   02/14/2018 20 10 - 40 U/L Final     ALT   Date Value Ref Range Status   02/14/2018 13 10 - 44 U/L Final     Anion Gap   Date Value Ref Range Status   02/14/2018 9 8 - 16 mmol/L Final     eGFR if    Date Value Ref Range Status   02/14/2018 >60.0 >60 mL/min/1.73 m^2 Final     eGFR if non    Date Value Ref Range Status   02/14/2018 >60.0 >60 mL/min/1.73 m^2 Final     Comment:     Calculation used to obtain the estimated glomerular filtration  rate (eGFR) is the CKD-EPI equation.        MS Profile   Order: [id number]   Status:  Final result   Visible to patient:  Yes (Patient Portal) Next appt:  None   Notes Recorded by Thierry Monteiro MD on 9/5/2017 at 4:43 PM CDT  The MS profile on the spinal fluid is  abnormal and is consistent with the diagnosis of multiple sclerosis.  We will need to discuss treatment options with you.    Ref Range & Units 5mo ago   CSF Bands bands 14    Olig Bands Interpretation, CSF <4 bands 12     Comments: The oligoclonal band assay detected 4 or more unique IgG   bands in the CSF. This is a positive result.   CSF is used in the diagnosis of MS by identifying increased   intrathecal IgG synthesis qualitatively (Oligoclonal Bands)   or quantitatively (IgG index or IgG synthesis rate, CSF).   Oligoclonal bands (4 or more CSF-specific bands) and/or an   elevated CSF IgG index are detected in up to 90% of   patients with MS. These findings, however, are not specific   for MS as CSF-specific IgG synthesis may also be found in   patients with other neurologic diseases including   infectious, inflammatory, cerebrovascular, and   paraneoplastic disorders.    Serum Bands bands 2    IgG Index, CSF <=0.85 1.56     IgG, CSF <=8.1 mg/dL 3.9    Comments: -------------------ADDITIONAL INFORMATION-------------------   This test has been modified from the 's   instructions. Its performance characteristics were   determined by [facility] in a manner consistent with   CLIA requirements. This test has not been cleared or   approved by the U.S. Food and Drug Administration.    Albumin, CSF <=27.0 mg/dL 7.8    Comments: -------------------ADDITIONAL INFORMATION-------------------   This test has been modified from the 's   instructions. Its performance characteristics were   determined by [facility] in a manner consistent with   CLIA requirements. This test has not been cleared or   approved by the U.S. Food and Drug Administration.    IgG/Albumin Ratio, CSF <=0.21 0.50     IgG Synthetic Rate <=12 mg/24 h 11.57    IgG,S 767 - 1590 mg/dL 1280    Albumin, Serum 3200 - 4800 mg/dL 3940    IgG/Albumin, S <=0.40 0.32    Comments: Test Performed by:   [facility]  [address] [address]    Resulting Agency  [place]   Narrative     Specimen Tube Number->Tube 3      Specimen Collected: 09/01/17 14:48 Last Resulted: 09/05/17 14:48               Vitamin B-12 210 - 950 pg/mL 618       TSH 0.400 - 4.000 uIU/mL 2.612           ASSESSMENT:        1.   Multiple Sclerosis(relapsing remitting)    2.   Mild vibratory sense decrease bilateral LE's     3.   Abnormal MRI by report(no images available)                 DISCUSSION:   Patient presents to establish care with our [facility]. She was diagnosed in August of 2017 after what I suspect was her second event. Unfortunately, she did not bring her MRI disc today; however, I have read the report and appears typical of MS-she will mail disc to us to have uploaded. She did not start DMT(Tecfidera) as she is concerned about pregnancy risk. I do agree that if she is considering pregnancy soon I would prefer to stay with a first line therapy, namely Copaxone. We discussed this in full today and I provided her with written information as well. She and her  will consider and respond via phone/portal. I would like to repeat Brain imaging as she has not been on DMT, and get spine imaging as well(C-spine was not done when ordered previously).        RECOMMENDATIONS:  1. MRI's repeat Brain MRI, baseline C and T spine.           2. Labs for mimics today           3. Recommend starting Copaxone--she will consider and send message via portal(will send to [facility])           4. Continue Vit D3-I suspect we will need to increase dosing after lab results               Multiple sclerosis  -     CBC auto differential; Future; Expected date: 02/14/2018  -     Comprehensive metabolic panel; Future  -     [place]-Suppressor Ratio; Future; Expected date: 02/14/2018  -     SAMANTHA; Future; Expected date: 02/14/2018  -     Angiotensin converting enzyme; Future; Expected date: 02/14/2018  -     Anti Sm/RNP Antibody; Future;  Expected date: 02/14/2018  -     Anti-DNA antibody, double-stranded; Future; Expected date: 02/14/2018  -     Anti-scleroderma antibody; Future; Expected date: 02/14/2018  -     B. burgdorferi Abs (Lyme Disease); Future; Expected date: 02/14/2018  -     Cardiolipin antibody; Future; Expected date: 02/14/2018  -     Rheumatoid factor; Future; Expected date: 02/14/2018  -     Sjogrens syndrome-A extractable nuclear antibody; Future; Expected date: 02/14/2018  -     Sjogrens syndrome-B extractable nuclear antibody; Future; Expected date: 02/14/2018  -     MRI Cervical Spine W WO Cont; Future; Expected date: 02/14/2018  -     MRI Thoracic Spine W WO Cont; Future; Expected date: 02/14/2018  -     Stratify JCV Antibody (with Index); Future; Expected date: 02/14/2018  -     NMO AQUAPORIN-4-IGG, SERUM; Future; Expected date: 02/14/2018  -     Vitamin D; Future  -     MRI Brain W WO Contrast; Future; Expected date: 02/14/2018    Counseling regarding goals of care    Vitamin D insufficiency  -     Vitamin D; Future    Morbid obesity with BMI of 50.0-59.9, adult    Abnormal brain MRI    Sensory disturbance    Over 50% of this 90 minute visit was spent in one on one discussion with patient and  regarding diagnosis, medication management, DMT, symptom management.     Follow-up for follow up with Dr. Fall next available in about 3 months.  Patient agreed to POC today.    Attending, Dr. Fall, was available during today's encounter. Any change to plan along with cosign to appear in the EMR.     Noris Teague PA-C  [facility]

## 2018-02-15 PROBLEM — E55.9 VITAMIN D INSUFFICIENCY: Status: ACTIVE | Noted: 2018-02-15

## 2018-02-15 PROBLEM — Z71.89 COUNSELING REGARDING GOALS OF CARE: Status: ACTIVE | Noted: 2018-02-15

## 2018-02-15 PROBLEM — R90.89 ABNORMAL BRAIN MRI: Status: ACTIVE | Noted: 2018-02-15

## 2018-02-15 LAB
ABSOLUTE CD3: 3172 CELLS/UL (ref 700–2100)
ABSOLUTE CD8: 582 CELLS/UL (ref 200–900)
ACE SERPL-CCNC: 17 U/L
ANA SER QL IF: NORMAL
ANTI SM/RNP ANTIBODY: 0.47 EU
ANTI-SM/RNP INTERPRETATION: NEGATIVE
ANTI-SSA ANTIBODY: 0.6 EU
ANTI-SSA INTERPRETATION: NEGATIVE
ANTI-SSB ANTIBODY: 1.3 EU
ANTI-SSB INTERPRETATION: NEGATIVE
CD3%: 72.1 % (ref 55–83)
CD3+CD4+ CELLS # BLD: 2520 CELLS/UL (ref 300–1400)
CD3+CD4+ CELLS NFR BLD: 57.3 % (ref 28–57)
CD4/CD8 RATIO: 4.33 (ref 0.9–3.6)
CD8 % SUPPRESSOR T CELL: 13.2 % (ref 10–39)
DSDNA AB SER-ACNC: NORMAL [IU]/ML

## 2018-02-16 ENCOUNTER — DOCUMENTATION ONLY (OUTPATIENT)
Dept: NEUROLOGY | Facility: CLINIC | Age: 28
End: 2018-02-16

## 2018-02-16 LAB
B BURGDOR AB SER IA-ACNC: 0.09 INDEX VALUE
CARDIOLIPIN IGG SER IA-ACNC: <9.4 GPL
CARDIOLIPIN IGM SER IA-ACNC: <9.4 MPL
ENA SCL70 AB SER-ACNC: 64 UNITS

## 2018-02-20 ENCOUNTER — PATIENT MESSAGE (OUTPATIENT)
Dept: NEUROLOGY | Facility: CLINIC | Age: 28
End: 2018-02-20

## 2018-02-20 ENCOUNTER — TELEPHONE (OUTPATIENT)
Dept: NEUROLOGY | Facility: CLINIC | Age: 28
End: 2018-02-20

## 2018-02-20 DIAGNOSIS — R89.9 ABNORMAL LABORATORY TEST RESULT: Primary | ICD-10-CM

## 2018-02-21 LAB
JCPYV AB SERPL QL IA: POSITIVE
JCV INDEX: 0.8

## 2018-02-23 LAB — NMO/AQP4 FACS,S: NEGATIVE

## 2018-03-29 ENCOUNTER — TELEPHONE (OUTPATIENT)
Dept: RADIOLOGY | Facility: HOSPITAL | Age: 28
End: 2018-03-29

## 2018-04-05 ENCOUNTER — PATIENT OUTREACH (OUTPATIENT)
Dept: ADMINISTRATIVE | Facility: HOSPITAL | Age: 28
End: 2018-04-05

## 2018-05-23 ENCOUNTER — PATIENT MESSAGE (OUTPATIENT)
Dept: PSYCHIATRY | Facility: CLINIC | Age: 28
End: 2018-05-23

## 2018-06-21 ENCOUNTER — TELEPHONE (OUTPATIENT)
Dept: RADIOLOGY | Facility: HOSPITAL | Age: 28
End: 2018-06-21

## 2018-08-30 ENCOUNTER — TELEPHONE (OUTPATIENT)
Dept: RADIOLOGY | Facility: HOSPITAL | Age: 28
End: 2018-08-30

## 2018-12-20 ENCOUNTER — PATIENT MESSAGE (OUTPATIENT)
Dept: NEUROLOGY | Facility: CLINIC | Age: 28
End: 2018-12-20

## 2019-01-16 ENCOUNTER — HOSPITAL ENCOUNTER (EMERGENCY)
Facility: HOSPITAL | Age: 29
Discharge: HOME | End: 2019-01-16
Attending: EMERGENCY MEDICINE
Payer: COMMERCIAL

## 2019-01-16 ENCOUNTER — PATIENT MESSAGE (OUTPATIENT)
Dept: NEUROLOGY | Facility: CLINIC | Age: 29
End: 2019-01-16

## 2019-01-16 VITALS
DIASTOLIC BLOOD PRESSURE: 88 MMHG | HEART RATE: 80 BPM | TEMPERATURE: 98 F | HEIGHT: 67 IN | OXYGEN SATURATION: 100 % | RESPIRATION RATE: 20 BRPM | WEIGHT: 293 LBS | SYSTOLIC BLOOD PRESSURE: 166 MMHG | BODY MASS INDEX: 45.99 KG/M2

## 2019-01-16 DIAGNOSIS — G35 MULTIPLE SCLEROSIS: Primary | ICD-10-CM

## 2019-01-16 PROBLEM — R47.1 DYSARTHRIA AND ANARTHRIA: Status: ACTIVE | Noted: 2019-01-16

## 2019-01-16 LAB
ALBUMIN SERPL BCP-MCNC: 3.6 G/DL
ALP SERPL-CCNC: 61 U/L
ALT SERPL W/O P-5'-P-CCNC: 17 U/L
ANION GAP SERPL CALC-SCNC: 8 MMOL/L
ANISOCYTOSIS BLD QL SMEAR: ABNORMAL
AST SERPL-CCNC: 21 U/L
BASOPHILS # BLD AUTO: 0.03 K/UL
BASOPHILS NFR BLD: 0.2 %
BILIRUB SERPL-MCNC: 0.3 MG/DL
BILIRUB UR QL STRIP: NEGATIVE
BNP SERPL-MCNC: 19 PG/ML
BUN SERPL-MCNC: 8 MG/DL
BURR CELLS BLD QL SMEAR: ABNORMAL
CALCIUM SERPL-MCNC: 9.2 MG/DL
CHLORIDE SERPL-SCNC: 106 MMOL/L
CK SERPL-CCNC: 258 U/L
CLARITY UR: CLEAR
CO2 SERPL-SCNC: 23 MMOL/L
COLOR UR: YELLOW
CREAT SERPL-MCNC: 0.8 MG/DL
DACRYOCYTES BLD QL SMEAR: ABNORMAL
DIFFERENTIAL METHOD: ABNORMAL
EOSINOPHIL # BLD AUTO: 0.1 K/UL
EOSINOPHIL NFR BLD: 0.7 %
ERYTHROCYTE [DISTWIDTH] IN BLOOD BY AUTOMATED COUNT: 18.3 %
EST. GFR  (AFRICAN AMERICAN): >60 ML/MIN/1.73 M^2
EST. GFR  (NON AFRICAN AMERICAN): >60 ML/MIN/1.73 M^2
GLUCOSE SERPL-MCNC: 85 MG/DL
GLUCOSE UR QL STRIP: NEGATIVE
HCT VFR BLD AUTO: 33.4 %
HGB BLD-MCNC: 10.2 G/DL
HGB UR QL STRIP: NEGATIVE
HYPOCHROMIA BLD QL SMEAR: ABNORMAL
KETONES UR QL STRIP: NEGATIVE
LEUKOCYTE ESTERASE UR QL STRIP: NEGATIVE
LYMPHOCYTES # BLD AUTO: 5.3 K/UL
LYMPHOCYTES NFR BLD: 34.6 %
MCH RBC QN AUTO: 21 PG
MCHC RBC AUTO-ENTMCNC: 30.5 G/DL
MCV RBC AUTO: 69 FL
MONOCYTES # BLD AUTO: 0.7 K/UL
MONOCYTES NFR BLD: 4.8 %
NEUTROPHILS # BLD AUTO: 9.1 K/UL
NEUTROPHILS NFR BLD: 59.7 %
NITRITE UR QL STRIP: NEGATIVE
OVALOCYTES BLD QL SMEAR: ABNORMAL
PH UR STRIP: 6 [PH] (ref 5–8)
PLATELET # BLD AUTO: 474 K/UL
PMV BLD AUTO: 9.7 FL
POCT GLUCOSE: 79 MG/DL (ref 70–110)
POIKILOCYTOSIS BLD QL SMEAR: ABNORMAL
POTASSIUM SERPL-SCNC: 3.8 MMOL/L
PROT SERPL-MCNC: 7.3 G/DL
PROT UR QL STRIP: NEGATIVE
RBC # BLD AUTO: 4.85 M/UL
SODIUM SERPL-SCNC: 137 MMOL/L
SP GR UR STRIP: 1.01 (ref 1–1.03)
STOMATOCYTES BLD QL SMEAR: PRESENT
TARGETS BLD QL SMEAR: ABNORMAL
TROPONIN I SERPL DL<=0.01 NG/ML-MCNC: <0.006 NG/ML
URN SPEC COLLECT METH UR: NORMAL
UROBILINOGEN UR STRIP-ACNC: NEGATIVE EU/DL
WBC # BLD AUTO: 15.16 K/UL

## 2019-01-16 PROCEDURE — 93005 ELECTROCARDIOGRAM TRACING: CPT

## 2019-01-16 PROCEDURE — 81003 URINALYSIS AUTO W/O SCOPE: CPT

## 2019-01-16 PROCEDURE — 85025 COMPLETE CBC W/AUTO DIFF WBC: CPT

## 2019-01-16 PROCEDURE — 36415 COLL VENOUS BLD VENIPUNCTURE: CPT

## 2019-01-16 PROCEDURE — 82962 GLUCOSE BLOOD TEST: CPT

## 2019-01-16 PROCEDURE — 84484 ASSAY OF TROPONIN QUANT: CPT

## 2019-01-16 PROCEDURE — 99244 PR OFFICE CONSULTATION,LEVEL IV: ICD-10-PCS | Mod: GT,,, | Performed by: PSYCHIATRY & NEUROLOGY

## 2019-01-16 PROCEDURE — 80053 COMPREHEN METABOLIC PANEL: CPT

## 2019-01-16 PROCEDURE — 82550 ASSAY OF CK (CPK): CPT

## 2019-01-16 PROCEDURE — 93010 EKG 12-LEAD: ICD-10-PCS | Mod: ,,, | Performed by: INTERNAL MEDICINE

## 2019-01-16 PROCEDURE — 83880 ASSAY OF NATRIURETIC PEPTIDE: CPT

## 2019-01-16 PROCEDURE — 99285 EMERGENCY DEPT VISIT HI MDM: CPT | Mod: 25

## 2019-01-16 PROCEDURE — 99244 OFF/OP CNSLTJ NEW/EST MOD 40: CPT | Mod: GT,,, | Performed by: PSYCHIATRY & NEUROLOGY

## 2019-01-16 PROCEDURE — 93010 ELECTROCARDIOGRAM REPORT: CPT | Mod: ,,, | Performed by: INTERNAL MEDICINE

## 2019-01-16 RX ORDER — ALPHA LIPOIC ACID 100 MG
CAPSULE ORAL
COMMUNITY

## 2019-01-16 RX ORDER — ACETAMINOPHEN 500 MG
10000 TABLET ORAL DAILY
COMMUNITY

## 2019-01-16 NOTE — HPI
27 y/o female with 2 year history of MS was in [facility] until 0930 when she developed right arm and leg heaviness and dysarthria. Per mother, this has improved markedly since onset. No previous history of transient neurological symptoms.

## 2019-01-16 NOTE — CONSULTS
[facility]  Vascular Neurology  Comprehensive Stroke Center  Tele-Consultation Note      Consults    Consulting Provider: Spoke Physician:: Donovan Flores  Current Providers  No providers found    Patient Location: [facility]  Spoke hospital nurse at bedside with patient assisting consultant.     Patient information was obtained from patient, spouse/SO and parent.       Assessment/Plan:     STROKE DOCUMENTATION     Acute Stroke Times:   Acute Stroke Times   Last Known Normal Date: 01/16/19  Last Known Normal Time: 0930  Symptom Onset Date: 01/16/19  Symptom Onset Time: 0930  Stroke Team Called Date: 01/16/19  Stroke Team Called Time: 1514  Stroke Team Arrival Date: 01/16/19  Stroke Team Arrival Time: 1516  CT Interpretation Time: 1540    NIH Scale:  Interval: baseline  1a. Level of Consciousness: 0-->Alert, keenly responsive  1b. LOC Questions: 0-->Answers both questions correctly  1c. LOC Commands: 0-->Performs both tasks correctly  2. Best Gaze: 0-->Normal  3. Visual: 0-->No visual loss  4. Facial Palsy: 0-->Normal symmetrical movements  5a. Motor Arm, Left: 0-->No drift, limb holds 90 (or 45) degrees for full 10 secs  5b. Motor Arm, Right: 0-->No drift, limb holds 90 (or 45) degrees for full 10 secs  6a. Motor Leg, Left: 0-->No drift, leg holds 30 degree position for full 5 secs  6b. Motor Leg, Right: 0-->No drift, leg holds 30 degree position for full 5 secs  7. Limb Ataxia: 0-->Absent  8. Sensory: 0-->Normal, no sensory loss  9. Best Language: 0-->No aphasia, normal  10. Dysarthria: 1-->Mild-to-moderate dysarthria, patient slurs at least some words and, at worst, can be understood with some difficulty  11. Extinction and Inattention (formerly Neglect): 0-->No abnormality  Total (NIH Stroke Scale): 1     Modified Jo Score: 0  Haley Coma Scale:    ABCD2 Score:    [id number] Score:   HAS -BLED Score:   ICH Score:   [employer]  "Classification:       Diagnoses:   Dysarthria and anarthria    Dysarthria and anarthria  Can not exclude stroke but the only finding at this time is dysarthria and the patient's history of MS makes that the most likely etiology. Get and MRI of brain with and without to assess for acute MS vs Stroke. Do not transfer and tPA is contraindicated         Blood pressure (!) 189/97, pulse 99, temperature 98 °F (36.7 °C), temperature source Oral, resp. rate 18, height 5' 7" (1.702 m), weight (!) 179.2 kg (395 lb 1 oz), SpO2 100 %.  Alteplase Eligible?: No  Alteplase Recommendation: Alteplase not recommended due to Outside of treatment window  and Suspected stroke mimic   Possible Interventional Revascularization Candidate? No; No ischemic penumbra    Disposition Recommendation: pending further studies  admit to inpatient  do not transfer      Subjective:     History of Present Illness:  29 y/o female with 2 year history of MS [place] [facility] [place] she developed right arm and leg heaviness and dysarthria. Per mother, this has improved markedly since onset. No previous history of transient neurological symptoms.      Woke up with symptoms?: no  Last known normal: Last Known Normal Date: 01/16/19 Last Known Normal Time: 0930    Recent bleeding noted: no  Does the patient take any Blood Thinners? no  Medications: No relevant medications      Past Medical History: hypertension and MS    Past Surgical History: no relevant surgical history    Family History: no relevant history    Social History: no smoking, no drinking, no drugs    Allergies: No Known Allergies     Review of Systems   Neurological: Positive for speech difficulty and weakness.   All other systems reviewed and are negative.    Objective:   Vitals: Blood pressure (!) 189/97, pulse 99, temperature 98 °F (36.7 °C), temperature source Oral, resp. rate 18, height 5' 7" (1.702 m), weight (!) 179.2 kg (395 lb 1 oz), SpO2 100 %.     CT READ: Yes  No hemmorhage. No " mass effect. No early infarct signs.     Physical Exam   Constitutional: She is oriented to person, place, and time. She appears well-developed and well-nourished.   HENT:   Head: Normocephalic and atraumatic.   Eyes: EOM are normal. Pupils are equal, round, and reactive to light.   Cardiovascular: Normal rate and regular rhythm.   Pulmonary/Chest: Effort normal.   Neurological: She is alert and oriented to person, place, and time.   Vitals reviewed.            Recommended the emergency room physician to have a brief discussion with the patient and/or family if available regarding the risks and benefits of treatment, and to briefly document the occurrence of that discussion in his clinical encounter note.     The attending portion of this evaluation, treatment, and documentation was performed per Radha Carbajal MD via audiovisual.    Billing code:  (non-intervention mild to moderate stroke, TIA, some mimics)    · This patient has a critical neurological condition/illness, with some potential for high morbidity and mortality.  · There is a moderate probability for acute neurological change leading to clinical and possibly life-threatening deterioration requiring highest level of physician preparedness for urgent intervention.  · Care was coordinated with other physicians involved in the patient's care.  · Radiologic studies and laboratory data were reviewed and interpreted, and plan of care was re-assessed based on the results.  · Diagnosis, treatment options and prognosis may have been discussed with the patient and/or family members or caregiver.      In your opinion, this was a: Tier 2    Consult End Time: 2739     Radha Carbajal MD  [facility]  Vascular Neurology   [facility]

## 2019-01-16 NOTE — TELEPHONE ENCOUNTER
"Returned call. Pt's mom reporting symptoms as pt's speech is extremely slurred. She said began at 9AM this morning. Pt's entire tongue became numb and her speech is extremely slurred. Pt reports not being able to write with her right hand, which is new, and started a few of hours ago. When asked if pt is experiencing weakness in any other part of her body, she responds, "she says she doesn't feel weak, just feels funny." Strongly advised pt's mom to get patient to the ER asap. Pt's mom verbalized understanding.   "

## 2019-01-16 NOTE — TELEPHONE ENCOUNTER
----- Message from Sheryl Blackmon sent at 1/16/2019  1:19 PM CST -----  Contact: Pt  Pt is requesting a call back due to pt having a flare up. Pt is very concerned and would like to speak with someone as soon as possible.       Pt can be contacted t [phone]

## 2019-01-16 NOTE — ASSESSMENT & PLAN NOTE
Dysarthria and anarthria  Can not exclude stroke but the only finding at this time is dysarthria and the patient's history of MS makes that the most likely etiology. Get and MRI of brain with and without to assess for acute MS vs Stroke. Do not transfer and tPA is contraindicated

## 2019-01-16 NOTE — ED NOTES
Pt states she began with difficulty speaking this morning and having heaviness to RUE.  Symptoms have greatly improved as of now.

## 2019-01-16 NOTE — ED PROVIDER NOTES
"SCRIBE #1 NOTE: I, Ftaimah Davis, am scribing for, and in the presence of, Donovan Flores MD. I have scribed the entire note.       History     Chief Complaint   Patient presents with    Aphasia     Pt reports Dx with MS 1 year ago, pt c/o sudden onset of slurred speech, "tongue heaviness," and "heaviness" of RUE.      Review of patient's allergies indicates:  No Known Allergies      History of Present Illness     HPI    1/16/2019, 3:07 PM  History obtained from the patient      History of Present Illness: Kendy Ryder is a 28 y.o. female patient with a PMHx of MS who presents to the Emergency Department for evaluation of difficulty speaking which onset suddenly PTA. Pt states her tongue feels heavy. Her family states pt's speech has improved upon arrival. Symptoms are constant and moderate in severity. No mitigating or exacerbating factors reported. Associated sxs include R-sided heaviness. Patient denies any fever, chills, n/v/d, abd pain, CP, SOB, weakness, numbness, blurry vision, facial asymmetry, and all other sxs at this time. No further complaints or concerns at this time.       Arrival mode: Personal vehicle      PCP: Corey Denis MD        Past Medical History:  Past Medical History:   Diagnosis Date    Anemia     Multiple sclerosis        Past Surgical History:  Past Surgical History:   Procedure Laterality Date    DILATION AND CURETTAGE OF UTERUS           Family History:  Family History   Problem Relation Age of Onset    Hypertension Father     No Known Problems Mother        Social History:  Social History     Tobacco Use    Smoking status: Never Smoker    Smokeless tobacco: Never Used   Substance and Sexual Activity    Alcohol use: No    Drug use: No    Sexual activity: Yes     Partners: Male        Review of Systems     Review of Systems   Constitutional: Negative for chills, diaphoresis and fever.   HENT: Negative for congestion, rhinorrhea and sore throat.    Eyes:        " (-) blurry vision   Respiratory: Negative for cough and shortness of breath.    Cardiovascular: Negative for chest pain.   Gastrointestinal: Negative for abdominal pain, diarrhea, nausea and vomiting.   Genitourinary: Negative for dysuria, frequency and hematuria.   Musculoskeletal: Negative for back pain and neck pain.   Skin: Negative for rash.   Neurological: Positive for speech difficulty (Tongue heaviness). Negative for dizziness, weakness, numbness and headaches.        (+) R-sided heaviness   Hematological: Does not bruise/bleed easily.   All other systems reviewed and are negative.       Physical Exam     Initial Vitals [01/16/19 1457]   BP Pulse Resp Temp SpO2   (!) 189/97 99 18 98 °F (36.7 °C) 100 %      MAP       --          Physical Exam  Nursing Notes and Vital Signs Reviewed.  Constitutional: Patient is in mild distress. Well-developed and well-nourished.  Head: Atraumatic. Normocephalic.  Eyes: PERRL. EOM intact. Conjunctivae are not pale. No scleral icterus.  ENT: Mucous membranes are moist. Oropharynx is clear and symmetric.    Neck: Supple. Full ROM. No lymphadenopathy.  Cardiovascular: Regular rate. Regular rhythm. No murmurs, rubs, or gallops. Distal pulses are 2+ and symmetric.  Pulmonary/Chest: No respiratory distress. Clear to auscultation bilaterally. No wheezing or rales.  Abdominal: Soft and non-distended.  There is no tenderness.  No rebound, guarding, or rigidity.   Musculoskeletal: Moves all extremities. No obvious deformities. No edema.  Skin: Warm and dry.  Neurological: Patient is alert and oriented to person, place and time. Pupils ERRL and EOM normal. Cranial nerves II-XII are intact. Strength is full bilaterally; it is equal and 5/5 in bilateral upper and lower extremities. There is no pronator drift of outstretched arms. Light touch sense is intact. Speech is slow and slurred.   Psychiatric: Normal affect. Good eye contact. Appropriate in content.     ED Course   Procedures  ED  "Vital Signs:  Vitals:    01/16/19 1457 01/16/19 1552 01/16/19 1737   BP: (!) 189/97  (!) 166/88   Pulse: 99 84 80   Resp: 18  20   Temp: 98 °F (36.7 °C)  98.3 °F (36.8 °C)   TempSrc: Oral     SpO2: 100%  100%   Weight: (!) 179.2 kg (395 lb 1 oz)     Height: 5' 7" (1.702 m)         Abnormal Lab Results:  Labs Reviewed   CBC W/ AUTO DIFFERENTIAL - Abnormal; Notable for the following components:       Result Value    WBC 15.16 (*)     Hemoglobin 10.2 (*)     Hematocrit 33.4 (*)     MCV 69 (*)     MCH 21.0 (*)     MCHC 30.5 (*)     RDW 18.3 (*)     Platelets 474 (*)     Gran # (ANC) 9.1 (*)     Lymph # 5.3 (*)     All other components within normal limits   CK - Abnormal; Notable for the following components:     (*)     All other components within normal limits   COMPREHENSIVE METABOLIC PANEL   URINALYSIS, REFLEX TO URINE CULTURE    Narrative:     Preferred Collection Type->Urine, Clean Catch   B-TYPE NATRIURETIC PEPTIDE   TROPONIN I   POCT GLUCOSE   POCT GLUCOSE MONITORING CONTINUOUS        All Lab Results:  Results for orders placed or performed during the hospital encounter of 01/16/19   CBC auto differential   Result Value Ref Range    WBC 15.16 (H) 3.90 - 12.70 K/uL    RBC 4.85 4.00 - 5.40 M/uL    Hemoglobin 10.2 (L) 12.0 - 16.0 g/dL    Hematocrit 33.4 (L) 37.0 - 48.5 %    MCV 69 (L) 82 - 98 fL    MCH 21.0 (L) 27.0 - 31.0 pg    MCHC 30.5 (L) 32.0 - 36.0 g/dL    RDW 18.3 (H) 11.5 - 14.5 %    Platelets 474 (H) 150 - 350 K/uL    MPV 9.7 9.2 - 12.9 fL    Gran # (ANC) 9.1 (H) 1.8 - 7.7 K/uL    Lymph # 5.3 (H) 1.0 - 4.8 K/uL    Mono # 0.7 0.3 - 1.0 K/uL    Eos # 0.1 0.0 - 0.5 K/uL    Baso # 0.03 0.00 - 0.20 K/uL    Gran% 59.7 38.0 - 73.0 %    Lymph% 34.6 18.0 - 48.0 %    Mono% 4.8 4.0 - 15.0 %    Eosinophil% 0.7 0.0 - 8.0 %    Basophil% 0.2 0.0 - 1.9 %    Aniso Moderate     Poik Moderate     Hypo Occasional     Ovalocytes Occasional     Target Cells Occasional     Tear Drop Cells Occasional     Archer Cells " Occasional     Stomatocytes Present     Differential Method Automated    Comprehensive metabolic panel   Result Value Ref Range    Sodium 137 136 - 145 mmol/L    Potassium 3.8 3.5 - 5.1 mmol/L    Chloride 106 95 - 110 mmol/L    CO2 23 23 - 29 mmol/L    Glucose 85 70 - 110 mg/dL    BUN, Bld 8 6 - 20 mg/dL    Creatinine 0.8 0.5 - 1.4 mg/dL    Calcium 9.2 8.7 - 10.5 mg/dL    Total Protein 7.3 6.0 - 8.4 g/dL    Albumin 3.6 3.5 - 5.2 g/dL    Total Bilirubin 0.3 0.1 - 1.0 mg/dL    Alkaline Phosphatase 61 55 - 135 U/L    AST 21 10 - 40 U/L    ALT 17 10 - 44 U/L    Anion Gap 8 8 - 16 mmol/L    eGFR if African American >60 >60 mL/min/1.73 m^2    eGFR if non African American >60 >60 mL/min/1.73 m^2   Urinalysis, Reflex to Urine Culture Urine, Clean Catch   Result Value Ref Range    Specimen UA Urine, Clean Catch     Color, UA Yellow Yellow, Straw, Steffi    Appearance, UA Clear Clear    pH, UA 6.0 5.0 - 8.0    Specific Gravity, UA 1.015 1.005 - 1.030    Protein, UA Negative Negative    Glucose, UA Negative Negative    Ketones, UA Negative Negative    Bilirubin (UA) Negative Negative    Occult Blood UA Negative Negative    Nitrite, UA Negative Negative    Urobilinogen, UA Negative <2.0 EU/dL    Leukocytes, UA Negative Negative   Brain natriuretic peptide   Result Value Ref Range    BNP 19 0 - 99 pg/mL   CK   Result Value Ref Range     (H) 20 - 180 U/L   Troponin I   Result Value Ref Range    Troponin I <0.006 0.000 - 0.026 ng/mL   POCT glucose   Result Value Ref Range    POCT Glucose 79 70 - 110 mg/dL       Imaging Results:  Imaging Results          X-Ray Chest AP Portable (Final result)  Result time 01/16/19 16:12:53    Final result by Theo Snell MD (01/16/19 16:12:53)                 Impression:      No acute process seen.      Electronically signed by: Theo Snell MD  Date:    01/16/2019  Time:    16:12             Narrative:    EXAMINATION:  XR CHEST AP PORTABLE    CLINICAL HISTORY:  slurred  speech;    FINDINGS:  Single view of the chest.    Cardiac silhouette is normal.  The lungs demonstrate no evidence of active disease.  No evidence of pleural effusion or pneumothorax.  Bones appear intact.                               CT Head Without Contrast (Final result)  Result time 01/16/19 15:23:43    Final result by Theo Snell MD (01/16/19 15:23:43)                 Impression:      No acute abnormality. If there is additional clinical concern for acute infarct, MRI with diffusion weighted imaging recommended.    COMMUNICATION  This critical result was discovered/received at 15:20 hours.  The critical information above was relayed directly by me by telephone to Dr. Flores on 01/16/2019 at 15:23 hours.    All CT scans at this facility use dose modulation, iterative reconstruction, and/or weight based dosing when appropriate to reduce radiation dose to as low as reasonable achievable.      Electronically signed by: Theo Snell MD  Date:    01/16/2019  Time:    15:23             Narrative:    EXAMINATION:  CT HEAD WITHOUT CONTRAST    CLINICAL HISTORY:  Stroke;slurred speech;    TECHNIQUE:  Low dose axial CT images obtained throughout the head without intravenous contrast. Sagittal and coronal reconstructions were performed.    All CT scans at this facility use dose modulation, iterative reconstruction, and/or weight based dosing when appropriate to reduce radiation dose to as low as reasonable achievable.    COMPARISON:  08/02/2017    FINDINGS:  Intracranial compartment:    The brain parenchyma appears normal. No parenchymal mass, hemorrhage, edema or major vascular distribution infarct.    Ventricles and sulci are normal in size for age without evidence of hydrocephalus.    No extra-axial blood or fluid collections.    Skull/extracranial contents (limited evaluation): No fracture. Mastoid air cells and paranasal sinuses are essentially clear.                                 The EKG was ordered,  reviewed, and independently interpreted by the ED provider.  Interpretation time: 15:44  Rate: 82 BPM  Rhythm: normal sinus rhythm  Interpretation: No ST&T wave abnormalities. No STEMI.             The Emergency Provider reviewed the vital signs and test results, which are outlined above.     ED Discussion     3:41 PM: Dr. Flores discussed the pt's case with Dr. Carbajal (Neurology) who recommends obtaining an MRI w/ and w/o contrast. He states pt is not a candidate for tPA at this time. He suspects sxs are from pt's MS.    4:01 PM: Re-evaluated pt. Pt is resting comfortably and is in no acute distress.  Pt's speech is back to baseline.  D/w pt all pertinent results. D/w pt any concerns expressed at this time. Explained recommendation to get an MRI.  Patient states she doesn't want it, since she is better now. Answered all questions. Pt expresses understanding at this time.    4:12 PM: Dr. Flores discussed the pt's case with Dr. Castro (Neurology) who recommends treating MS flare-up with PO steroids.    5:20 PM: Reassessed pt at this time. Patient is awake, alert, and in NAD. Pt states her condition has improved at this time.  Does not want steroids, since her symptoms have resolved.  Discussed with pt all pertinent ED information and results. Discussed pt dx and plan of tx. Gave pt all f/u and return to the ED instructions. All questions and concerns were addressed at this time. Pt expresses understanding of information and instructions, and is comfortable with plan to discharge. Pt is stable for discharge.    I discussed with patient and/or family/caretaker that evaluation in the ED does not suggest any emergent or life threatening medical conditions requiring immediate intervention beyond what was provided in the ED, and I believe patient is safe for discharge.  Regardless, an unremarkable evaluation in the ED does not preclude the development or presence of a serious of life threatening condition. As such, patient  was instructed to return immediately for any worsening or change in current symptoms.    ED Medication(s):  Medications - No data to display      Follow-up Information     neurology.                       Medical Decision Making:   Clinical Tests:   Lab Tests: Reviewed and Ordered  Radiological Study: Reviewed and Ordered  Medical Tests: Reviewed and Ordered             Scribe Attestation:   Scribe #1: I performed the above scribed service and the documentation accurately describes the services I performed. I attest to the accuracy of the note.     Attending:   Physician Attestation Statement for Scribe #1: I, Donovan Flores MD, personally performed the services described in this documentation, as scribed by Fatimah Davis, in my presence, and it is both accurate and complete.           Clinical Impression       ICD-10-CM ICD-9-CM   1. Multiple sclerosis G35 340       Disposition:   Disposition: Discharged  Condition: Stable         Donovan Flores MD  01/16/19 6084

## 2019-01-16 NOTE — SUBJECTIVE & OBJECTIVE
"  Woke up with symptoms?: no  Last known normal: Last Known Normal Date: 01/16/19 Last Known Normal Time: 0930    Recent bleeding noted: no  Does the patient take any Blood Thinners? no  Medications: No relevant medications      Past Medical History: hypertension and MS    Past Surgical History: no relevant surgical history    Family History: no relevant history    Social History: no smoking, no drinking, no drugs    Allergies: No Known Allergies     Review of Systems   Neurological: Positive for speech difficulty and weakness.   All other systems reviewed and are negative.    Objective:   Vitals: Blood pressure (!) 189/97, pulse 99, temperature 98 °F (36.7 °C), temperature source Oral, resp. rate 18, height 5' 7" (1.702 m), weight (!) 179.2 kg (395 lb 1 oz), SpO2 100 %.     CT READ: Yes  No hemmorhage. No mass effect. No early infarct signs.     Physical Exam   Constitutional: She is oriented to person, place, and time. She appears well-developed and well-nourished.   HENT:   Head: Normocephalic and atraumatic.   Eyes: EOM are normal. Pupils are equal, round, and reactive to light.   Cardiovascular: Normal rate and regular rhythm.   Pulmonary/Chest: Effort normal.   Neurological: She is alert and oriented to person, place, and time.   Vitals reviewed.        "

## 2019-01-18 ENCOUNTER — OFFICE VISIT (OUTPATIENT)
Dept: NEUROLOGY | Facility: CLINIC | Age: 29
End: 2019-01-18
Payer: COMMERCIAL

## 2019-01-18 ENCOUNTER — HOSPITAL ENCOUNTER (OUTPATIENT)
Dept: RADIOLOGY | Facility: HOSPITAL | Age: 29
Discharge: HOME | End: 2019-01-18
Attending: PHYSICIAN ASSISTANT
Payer: COMMERCIAL

## 2019-01-18 VITALS
DIASTOLIC BLOOD PRESSURE: 112 MMHG | HEIGHT: 67 IN | HEART RATE: 116 BPM | WEIGHT: 293 LBS | SYSTOLIC BLOOD PRESSURE: 153 MMHG | BODY MASS INDEX: 45.99 KG/M2

## 2019-01-18 DIAGNOSIS — G35 MS (MULTIPLE SCLEROSIS): ICD-10-CM

## 2019-01-18 DIAGNOSIS — E66.01 MORBID OBESITY WITH BMI OF 60.0-69.9, ADULT: ICD-10-CM

## 2019-01-18 DIAGNOSIS — R47.1 DYSARTHRIA: ICD-10-CM

## 2019-01-18 DIAGNOSIS — Z71.89 COUNSELING REGARDING GOALS OF CARE: ICD-10-CM

## 2019-01-18 DIAGNOSIS — G35 MS (MULTIPLE SCLEROSIS): Primary | ICD-10-CM

## 2019-01-18 PROCEDURE — 72156 MRI CERVICAL SPINE DEMYELINATING W W/O CONTRAST: ICD-10-PCS | Mod: 26,,, | Performed by: RADIOLOGY

## 2019-01-18 PROCEDURE — 99999 PR PBB SHADOW E&M-EST. PATIENT-LVL IV: ICD-10-PCS | Mod: PBBFAC,,, | Performed by: PHYSICIAN ASSISTANT

## 2019-01-18 PROCEDURE — 72156 MRI NECK SPINE W/O & W/DYE: CPT | Mod: TC

## 2019-01-18 PROCEDURE — 99214 OFFICE O/P EST MOD 30 MIN: CPT | Mod: S$GLB,,, | Performed by: PHYSICIAN ASSISTANT

## 2019-01-18 PROCEDURE — 3008F BODY MASS INDEX DOCD: CPT | Mod: CPTII,S$GLB,, | Performed by: PHYSICIAN ASSISTANT

## 2019-01-18 PROCEDURE — 72156 MRI NECK SPINE W/O & W/DYE: CPT | Mod: 26,,, | Performed by: RADIOLOGY

## 2019-01-18 PROCEDURE — 70553 MRI BRAIN DEMYELINATING W/ WO CONTRAST: ICD-10-PCS | Mod: 26,,, | Performed by: RADIOLOGY

## 2019-01-18 PROCEDURE — 70553 MRI BRAIN STEM W/O & W/DYE: CPT | Mod: TC

## 2019-01-18 PROCEDURE — 3008F PR BODY MASS INDEX (BMI) DOCUMENTED: ICD-10-PCS | Mod: CPTII,S$GLB,, | Performed by: PHYSICIAN ASSISTANT

## 2019-01-18 PROCEDURE — 25500020 PHARM REV CODE 255: Performed by: PHYSICIAN ASSISTANT

## 2019-01-18 PROCEDURE — 99214 PR OFFICE/OUTPT VISIT, EST, LEVL IV, 30-39 MIN: ICD-10-PCS | Mod: S$GLB,,, | Performed by: PHYSICIAN ASSISTANT

## 2019-01-18 PROCEDURE — 99999 PR PBB SHADOW E&M-EST. PATIENT-LVL IV: CPT | Mod: PBBFAC,,, | Performed by: PHYSICIAN ASSISTANT

## 2019-01-18 PROCEDURE — 70553 MRI BRAIN STEM W/O & W/DYE: CPT | Mod: 26,,, | Performed by: RADIOLOGY

## 2019-01-18 PROCEDURE — 99354 PR PROLONGED SVC, OUPT, 1ST HR: ICD-10-PCS | Mod: S$GLB,,, | Performed by: PHYSICIAN ASSISTANT

## 2019-01-18 PROCEDURE — 99354 PR PROLONGED SVC, OUPT, 1ST HR: CPT | Mod: S$GLB,,, | Performed by: PHYSICIAN ASSISTANT

## 2019-01-18 PROCEDURE — A9585 GADOBUTROL INJECTION: HCPCS | Performed by: PHYSICIAN ASSISTANT

## 2019-01-18 RX ORDER — METHYLPREDNISOLONE SODIUM SUCCINATE 1 G/16ML
INJECTION INTRAMUSCULAR; INTRAVENOUS
Qty: 3000 MG | Refills: 0 | Status: SHIPPED | OUTPATIENT
Start: 2019-01-18 | End: 2019-07-19 | Stop reason: ALTCHOICE

## 2019-01-18 RX ORDER — GADOBUTROL 604.72 MG/ML
10 INJECTION INTRAVENOUS
Status: COMPLETED | OUTPATIENT
Start: 2019-01-18 | End: 2019-01-18

## 2019-01-18 RX ADMIN — GADOBUTROL 10 ML: 604.72 INJECTION INTRAVENOUS at 12:01

## 2019-01-18 NOTE — PROGRESS NOTES
"Subjective:       Patient ID: Kendy Ryder is a 28 y.o. female who presents today with her mother and child for a fit-in clinic visit for MS.  Last visit in February of 2018    MS HPI:  · DMT: none  · Side effects from DMT? No  · Taking vitamin D3 as recommended? Yes - 10,000IU/d Dos:   · Wednesday morning this week--noticed speech changes.   · Seen in ER for slurred speech earlier this week.  · Feels heavy on R side--she feels the reaction time is slowed, "I feel as if it is not my hand". R LE feels heavy, "but not as bad as hand" . R side of face is a little numb--"it feels tight" . States her tongue feels bigger.  · No issues with bowels or bladder.   · No falls    SOCIAL HISTORY  Social History     Tobacco Use    Smoking status: Never Smoker    Smokeless tobacco: Never Used   Substance Use Topics    Alcohol use: No    Drug use: No     Living arrangements - the patient lives with their family.  Employment  teacher    MS ROS:  As above        Objective:        1. 25 foot timed walk: 3.9s last visit in 2018, 4.6s today without assist      Neurologic Exam     Mental Status   Oriented to person, place, and time.   Follows 3 step commands.   Speech: speech is normal   Level of consciousness: alert  Normal comprehension.     Cranial Nerves     CN II   Visual acuity: normal with correction (20/20 OD and OS with Snellen hand held chart at 6 ft)    CN III, IV, VI   Pupils are equal, round, and reactive to light.  Extraocular motions are normal.     CN V   Facial sensation intact.     CN VII   Facial expression full, symmetric.     CN VIII   Hearing: intact (finger rub)    CN IX, X   Palate: symmetric    CN XI   CN XI normal.     CN XII   Tongue deviation: right (slight)    Motor Exam   Muscle bulk: normal  Overall muscle tone: normal    Strength   Right deltoid: 5/5  Left deltoid: 5/5  Right triceps: 5/5  Left triceps: 5/5  Right wrist extension: 5/5  Left wrist extension: 5/5  Right interossei: 5/5  Left " interossei: 5/5  Right iliopsoas: 5/5  Left iliopsoas: 5/5  Right hamstrin/5  Left hamstrin/5  Right anterior tibial: 5/5  Left anterior tibial: 5/5  Right peroneal: 5/5  Left peroneal: 55-/5 UE flexors on R  5-/5 LE HF     Sensory Exam   Light touch normal.   Right arm vibration: normal  Left arm vibration: normal  Right leg vibration: decreased from toes  Left leg vibration: decreased from toes    Gait, Coordination, and Reflexes     Gait  Gait: wide-based    Coordination   Romberg: negative  Tandem walking coordination: abnormal    Tremor   Resting tremor: absent  Action tremor: absent    Reflexes   Right brachioradialis: 2+  Left brachioradialis: 2+  Right biceps: 2+  Left biceps: 2+  Right triceps: 2+  Left triceps: 2+  Right patellar: 2+  Left patellar: 2+  Right achilles: 2+  Left achilles: 2+  Right plantar: normal  Left plantar: normal  Right Hsieh: absent  Left Hsieh: absent  Right ankle clonus: absent  Left ankle clonus: absent  Right pendular knee jerk: absent  Left pendular knee jerk: absentMild difficulty on R side Heel/toe walk  Slowed R UE RSM             Imaging:   Non contrast CT  FINDINGS:  Intracranial compartment:    The brain parenchyma appears normal. No parenchymal mass, hemorrhage, edema or major vascular distribution infarct.    Ventricles and sulci are normal in size for age without evidence of hydrocephalus.    No extra-axial blood or fluid collections.    Skull/extracranial contents (limited evaluation): No fracture. Mastoid air cells and paranasal sinuses are essentially clear.      Impression       No acute abnormality. If there is additional clinical concern for acute infarct, MRI with diffusion weighted imaging recommended.    COMMUNICATION  This critical result was discovered/received at 15:20 hours.  The critical information above was relayed directly by me by telephone to Dr. Flores on 2019 at 15:23 hours.    All CT scans at this facility use dose modulation,  iterative reconstruction, and/or weight based dosing when appropriate to reduce radiation dose to as low as reasonable achievable.      Electronically signed by: Theo Snell MD  Date: 01/16/2019  Time: 15:23         Labs:     Lab Results   Component Value Date    EPYBWGUL72GV 26 (L) 02/14/2018     Lab Results   Component Value Date    JCVINDEX 0.80 (A) 02/14/2018    JCVANTIBODY Positive (A) 02/14/2018     Lab Results   Component Value Date    XK4WYLCF 72.1 02/14/2018    ABSOLUTECD3 3172 (H) 02/14/2018    YF0OEMHJ 13.2 02/14/2018    ABSOLUTECD8 582 02/14/2018    CG7EKQIC 57.3 (H) 02/14/2018    ABSOLUTECD4 2520 (H) 02/14/2018    LABCD48 4.33 (H) 02/14/2018     Lab Results   Component Value Date    WBC 12.97 (H) 01/18/2019    RBC 5.11 01/18/2019    HGB 10.4 (L) 01/18/2019    HCT 36.4 (L) 01/18/2019    MCV 71 (L) 01/18/2019    MCH 20.4 (L) 01/18/2019    MCHC 28.6 (L) 01/18/2019    RDW 18.7 (H) 01/18/2019     (H) 01/18/2019    MPV 10.0 01/18/2019    GRAN 8.1 (H) 01/18/2019    GRAN 62.5 01/18/2019    LYMPH 4.1 01/18/2019    LYMPH 31.3 01/18/2019    MONO 0.6 01/18/2019    MONO 4.9 01/18/2019    EOS 0.1 01/18/2019    BASO 0.06 01/18/2019    EOSINOPHIL 0.4 01/18/2019    BASOPHIL 0.5 01/18/2019     Sodium   Date Value Ref Range Status   01/16/2019 137 136 - 145 mmol/L Final     Potassium   Date Value Ref Range Status   01/16/2019 3.8 3.5 - 5.1 mmol/L Final     Chloride   Date Value Ref Range Status   01/16/2019 106 95 - 110 mmol/L Final     CO2   Date Value Ref Range Status   01/16/2019 23 23 - 29 mmol/L Final     Glucose   Date Value Ref Range Status   01/16/2019 85 70 - 110 mg/dL Final     BUN, Bld   Date Value Ref Range Status   01/16/2019 8 6 - 20 mg/dL Final     Creatinine   Date Value Ref Range Status   01/16/2019 0.8 0.5 - 1.4 mg/dL Final     Calcium   Date Value Ref Range Status   01/16/2019 9.2 8.7 - 10.5 mg/dL Final     Total Protein   Date Value Ref Range Status   01/16/2019 7.3 6.0 - 8.4 g/dL Final      Albumin   Date Value Ref Range Status   01/16/2019 3.6 3.5 - 5.2 g/dL Final     Total Bilirubin   Date Value Ref Range Status   01/16/2019 0.3 0.1 - 1.0 mg/dL Final     Comment:     For infants and newborns, interpretation of results should be based  on gestational age, weight and in agreement with clinical  observations.  Premature Infant recommended reference ranges:  Up to 24 hours.............<8.0 mg/dL  Up to 48 hours............<12.0 mg/dL  3-5 days..................<15.0 mg/dL  6-29 days.................<15.0 mg/dL       Alkaline Phosphatase   Date Value Ref Range Status   01/16/2019 61 55 - 135 U/L Final     AST   Date Value Ref Range Status   01/16/2019 21 10 - 40 U/L Final     ALT   Date Value Ref Range Status   01/16/2019 17 10 - 44 U/L Final     Anion Gap   Date Value Ref Range Status   01/16/2019 8 8 - 16 mmol/L Final     eGFR if    Date Value Ref Range Status   01/16/2019 >60 >60 mL/min/1.73 m^2 Final     eGFR if non    Date Value Ref Range Status   01/16/2019 >60 >60 mL/min/1.73 m^2 Final     Comment:     Calculation used to obtain the estimated glomerular filtration  rate (eGFR) is the CKD-EPI equation.        Diagnosis/Assessment/Plan:    1. Multiple Sclerosis  · Assessment: Patient presents for urgent care visit today(last seen in February of 2018). She has had sudden change in speech this week, and I do detect some dysarthria today. She has some mild weakness in UE>LE on R side as compared to L side. Will recheck CBC-in ER her WBC was elevated). UA was normal in ER. I do suspect she is relapsing as she has not been on DMT and her dysarthria is a new symptom for her.   · Imaging:MRI of Brain and C-spine ordered STAT today  · Disease Modifying Therapies: She is not on DMT but is taking Vit D3. After last visit in Feb 2018 she and her  decided not to move forward with Copaxone as they felt it might interfere with her becoming pregnant. I have ordered oral  smoothie steroids-unsure if MRI will be scheduled for today. She will  and await further instruction(as today is Friday and they live in [place])--we will follow MRI results.           2.  Employment: I have written excuse for today. She is off this upcoming Monday as it is a holiday. We will monitor her ability to return to work on Tuesday. 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   Follow-up in about 2 weeks (around 2/1/2019).  Patient agreed to POC today.    Attending, Dr. Fall, was available during today's encounter.     Noris Teague PA-C  [facility]      Problem List Items Addressed This Visit        Other    Counseling regarding goals of care      Other Visit Diagnoses     MS (multiple sclerosis)    -  Primary    Relevant Medications    methylPREDNISolone sodium succinate (SOLU-MEDROL) 1,000 mg injection    Other Relevant Orders    MRI Brain Demyelinating W W/O Contrast (Completed)    MRI Cervical Spine Demyelinating W W/O Contrast (Completed)    CBC auto differential (Completed)    POCT Urine Pregnancy    Dysarthria        Relevant Medications    methylPREDNISolone sodium succinate (SOLU-MEDROL) 1,000 mg injection    Other Relevant Orders    CBC auto differential (Completed)    POCT Urine Pregnancy    Morbid obesity with BMI of 60.0-69.9, adult

## 2019-01-24 ENCOUNTER — PATIENT MESSAGE (OUTPATIENT)
Dept: PSYCHIATRY | Facility: CLINIC | Age: 29
End: 2019-01-24

## 2019-01-24 ENCOUNTER — TELEPHONE (OUTPATIENT)
Dept: NEUROLOGY | Facility: CLINIC | Age: 29
End: 2019-01-24

## 2019-01-24 NOTE — TELEPHONE ENCOUNTER
Pt reports her speech has improved, and she feels about 85% better. Pt took the entire week of of work and will return next week. Advised pt to call should she need anything.

## 2019-02-06 ENCOUNTER — OFFICE VISIT (OUTPATIENT)
Dept: NEUROLOGY | Facility: CLINIC | Age: 29
End: 2019-02-06
Payer: COMMERCIAL

## 2019-02-06 ENCOUNTER — LAB VISIT (OUTPATIENT)
Dept: LAB | Facility: HOSPITAL | Age: 29
End: 2019-02-06
Attending: PSYCHIATRY & NEUROLOGY
Payer: COMMERCIAL

## 2019-02-06 VITALS — BODY MASS INDEX: 45.99 KG/M2 | HEIGHT: 67 IN | WEIGHT: 293 LBS

## 2019-02-06 DIAGNOSIS — E55.9 VITAMIN D DEFICIENCY: ICD-10-CM

## 2019-02-06 DIAGNOSIS — G35 MS (MULTIPLE SCLEROSIS): ICD-10-CM

## 2019-02-06 DIAGNOSIS — G35 MS (MULTIPLE SCLEROSIS): Primary | ICD-10-CM

## 2019-02-06 LAB — 25(OH)D3+25(OH)D2 SERPL-MCNC: 46 NG/ML

## 2019-02-06 PROCEDURE — 3008F BODY MASS INDEX DOCD: CPT | Mod: CPTII,S$GLB,, | Performed by: PSYCHIATRY & NEUROLOGY

## 2019-02-06 PROCEDURE — 36415 COLL VENOUS BLD VENIPUNCTURE: CPT

## 2019-02-06 PROCEDURE — 86360 T CELL ABSOLUTE COUNT/RATIO: CPT

## 2019-02-06 PROCEDURE — 82306 VITAMIN D 25 HYDROXY: CPT

## 2019-02-06 PROCEDURE — 99215 OFFICE O/P EST HI 40 MIN: CPT | Mod: S$GLB,,, | Performed by: PSYCHIATRY & NEUROLOGY

## 2019-02-06 PROCEDURE — 99999 PR PBB SHADOW E&M-EST. PATIENT-LVL III: CPT | Mod: PBBFAC,,, | Performed by: PSYCHIATRY & NEUROLOGY

## 2019-02-06 PROCEDURE — 99215 PR OFFICE/OUTPT VISIT, EST, LEVL V, 40-54 MIN: ICD-10-PCS | Mod: S$GLB,,, | Performed by: PSYCHIATRY & NEUROLOGY

## 2019-02-06 PROCEDURE — 86359 T CELLS TOTAL COUNT: CPT

## 2019-02-06 PROCEDURE — 99999 PR PBB SHADOW E&M-EST. PATIENT-LVL III: ICD-10-PCS | Mod: PBBFAC,,, | Performed by: PSYCHIATRY & NEUROLOGY

## 2019-02-06 PROCEDURE — 3008F PR BODY MASS INDEX (BMI) DOCUMENTED: ICD-10-PCS | Mod: CPTII,S$GLB,, | Performed by: PSYCHIATRY & NEUROLOGY

## 2019-02-06 RX ORDER — GLATIRAMER 40 MG/ML
40 INJECTION, SOLUTION SUBCUTANEOUS
Qty: 12 SYRINGE | Refills: 5 | Status: SHIPPED | OUTPATIENT
Start: 2019-02-06 | End: 2019-02-26 | Stop reason: SDUPTHER

## 2019-02-06 NOTE — PROGRESS NOTES
Subjective:       Patient ID: Kendy Ryder is a 28 y.o. female who presents today with her mother and child for a routine clinic visit for MS.  She was seen 3 weeks ago as fit in by Noris Teague for MS exacerbation    MS HPI:  · DMT: none  · Side effects from DMT? No  · Taking vitamin D3 as recommended? Yes - 10,000IU/d Dose:   · She feels back to normal after 3 days of high dose steroids smoothie;   · She is ready to start DMT, and wants to start Copaxone;   · New new complaints  · Does want to conceive but will try to do so on Copaxone and then d/c Copaxone once pregnant    SOCIAL HISTORY  Social History     Tobacco Use    Smoking status: Never Smoker    Smokeless tobacco: Never Used   Substance Use Topics    Alcohol use: No    Drug use: No     Living arrangements - the patient lives with their family.  Employment  teacher        Objective:        1. 25 foot timed walk: 3.7s without assist; was 4.6 s last visit    MENTAL STATUS: grossly intact  CRANIAL NERVE EXAM: There is no internuclear ophthalmoplegia. Extraocular   muscles are intact.  No facial   asymmetry.There is no dysarthria.   MOTOR EXAM: Normal bulk and tone throughout UE and LE bilaterally. Rapid sequential movements are normal. Strength is 5/5 in all groups   in the lower extremities and upper extremities.   REFLEXES: Symmetric and 2+ throughout in all 4 extremities.   SENSORY EXAM: Normal to vibration t/o  COORDINATION: Normal finger-to-nose exam.   GAIT: Narrow based and stable.      Imaging:   MRIs 1/2019  One enhancing lesion left centrum semiovale, and one new non-enhancing lesions  C spine MRI with no lesions      Labs:     Lab Results   Component Value Date    BFPMKXHQ94IN 46 02/06/2019    PZGSJGTB41ZK 26 (L) 02/14/2018     Lab Results   Component Value Date    JCVINDEX 0.80 (A) 02/14/2018    JCVANTIBODY Positive (A) 02/14/2018     Lab Results   Component Value Date    FI2SUOMT 70.7 02/06/2019    ABSOLUTECD3 3070 (H) 02/06/2019     PX0KFZRD 14.5 02/06/2019    ABSOLUTECD8 630 02/06/2019    CI4PAJXX 54.2 02/06/2019    ABSOLUTECD4 2350 (H) 02/06/2019    LABCD48 3.73 (H) 02/06/2019     Lab Results   Component Value Date    WBC 12.97 (H) 01/18/2019    RBC 5.11 01/18/2019    HGB 10.4 (L) 01/18/2019    HCT 36.4 (L) 01/18/2019    MCV 71 (L) 01/18/2019    MCH 20.4 (L) 01/18/2019    MCHC 28.6 (L) 01/18/2019    RDW 18.7 (H) 01/18/2019     (H) 01/18/2019    MPV 10.0 01/18/2019    GRAN 8.1 (H) 01/18/2019    GRAN 62.5 01/18/2019    LYMPH 4.1 01/18/2019    LYMPH 31.3 01/18/2019    MONO 0.6 01/18/2019    MONO 4.9 01/18/2019    EOS 0.1 01/18/2019    BASO 0.06 01/18/2019    EOSINOPHIL 0.4 01/18/2019    BASOPHIL 0.5 01/18/2019     Sodium   Date Value Ref Range Status   01/16/2019 137 136 - 145 mmol/L Final     Potassium   Date Value Ref Range Status   01/16/2019 3.8 3.5 - 5.1 mmol/L Final     Chloride   Date Value Ref Range Status   01/16/2019 106 95 - 110 mmol/L Final     CO2   Date Value Ref Range Status   01/16/2019 23 23 - 29 mmol/L Final     Glucose   Date Value Ref Range Status   01/16/2019 85 70 - 110 mg/dL Final     BUN, Bld   Date Value Ref Range Status   01/16/2019 8 6 - 20 mg/dL Final     Creatinine   Date Value Ref Range Status   01/16/2019 0.8 0.5 - 1.4 mg/dL Final     Calcium   Date Value Ref Range Status   01/16/2019 9.2 8.7 - 10.5 mg/dL Final     Total Protein   Date Value Ref Range Status   01/16/2019 7.3 6.0 - 8.4 g/dL Final     Albumin   Date Value Ref Range Status   01/16/2019 3.6 3.5 - 5.2 g/dL Final     Total Bilirubin   Date Value Ref Range Status   01/16/2019 0.3 0.1 - 1.0 mg/dL Final     Comment:     For infants and newborns, interpretation of results should be based  on gestational age, weight and in agreement with clinical  observations.  Premature Infant recommended reference ranges:  Up to 24 hours.............<8.0 mg/dL  Up to 48 hours............<12.0 mg/dL  3-5 days..................<15.0 mg/dL  6-29  days.................<15.0 mg/dL       Alkaline Phosphatase   Date Value Ref Range Status   01/16/2019 61 55 - 135 U/L Final     AST   Date Value Ref Range Status   01/16/2019 21 10 - 40 U/L Final     ALT   Date Value Ref Range Status   01/16/2019 17 10 - 44 U/L Final     Anion Gap   Date Value Ref Range Status   01/16/2019 8 8 - 16 mmol/L Final     eGFR if    Date Value Ref Range Status   01/16/2019 >60 >60 mL/min/1.73 m^2 Final     eGFR if non    Date Value Ref Range Status   01/16/2019 >60 >60 mL/min/1.73 m^2 Final     Comment:     Calculation used to obtain the estimated glomerular filtration  rate (eGFR) is the CKD-EPI equation.        Diagnosis/Assessment/Plan:    1. Multiple Sclerosis  · Assessment: Patient has had a relapse off DMT   · Imaging: will be planned in 6 months assuming NOT pregnant; otherwise will defer; will likely opt to do without contrast as she is trying to conceive  · Disease Modifying Therapies:  After shared decision making, we will start Copaoxne; The rationale, side effects, risks and expectations of this medication was discussed. Continue high dose D3. The the patient was counseled about the importance of vitamin D supplementation in multiple sclerosis, and the fact that recent data suggests that high circulating blood levels of vitamin D has an ameliorating effect on the disease course in multiple sclerosis in general.    Follow up Noris Teague PA-C in 3months     Over 50% of this 40 minute visit was spent in direct face to face with the patient  and her mother about MS, Copaxone and f/u  plan        Problem List Items Addressed This Visit     None      Visit Diagnoses     MS (multiple sclerosis)    -  Primary    Relevant Medications    glatiramer (COPAXONE) 40 mg/mL Syrg injection    Other Relevant Orders    Mountain-Suppressor Ratio (Completed)    Vitamin D (Completed)    Vitamin D deficiency        Relevant Orders    Mountain-Suppressor Ratio (Completed)     Vitamin D (Completed)

## 2019-02-07 LAB
ABSOLUTE CD3: 3070 CELLS/UL (ref 700–2100)
ABSOLUTE CD8: 630 CELLS/UL (ref 200–900)
CD3%: 70.7 % (ref 55–83)
CD3+CD4+ CELLS # BLD: 2350 CELLS/UL (ref 300–1400)
CD3+CD4+ CELLS NFR BLD: 54.2 % (ref 28–57)
CD4/CD8 RATIO: 3.73 (ref 0.9–3.6)
CD8 % SUPPRESSOR T CELL: 14.5 % (ref 10–39)

## 2019-02-08 ENCOUNTER — TELEPHONE (OUTPATIENT)
Dept: PHARMACY | Facility: CLINIC | Age: 29
End: 2019-02-08

## 2019-02-15 ENCOUNTER — DOCUMENTATION ONLY (OUTPATIENT)
Dept: NEUROLOGY | Facility: CLINIC | Age: 29
End: 2019-02-15

## 2019-02-15 NOTE — PROGRESS NOTES
Fax received from [employer]. Glatiramer 40mg PA approved from 1/16/19-2/15/20 with case ID [id number].

## 2019-02-15 NOTE — TELEPHONE ENCOUNTER
DOCUMENTATION ONLY:  Prior Authorization for Glatiramer approved from 01/16/2019 to 02/15/2020    Case Id: [id number]    Co-pay:     Patient Assistance IS required  HALIMA Mendes MS Advocate patient enrollment form submitted on 02/18 at 11:35am-HALIMA      DOCUMENTATION ONLY  Submitted prior authorization request for Glatiramer to insurance on 02/15 11:43am. HALIMA

## 2019-02-26 DIAGNOSIS — G35 MULTIPLE SCLEROSIS: Primary | ICD-10-CM

## 2019-02-26 RX ORDER — GLATIRAMER 40 MG/ML
40 INJECTION, SOLUTION SUBCUTANEOUS
Qty: 36 SYRINGE | Refills: 1 | Status: SHIPPED | OUTPATIENT
Start: 2019-02-27 | End: 2019-07-29 | Stop reason: SDUPTHER

## 2019-02-26 NOTE — TELEPHONE ENCOUNTER
FOR DOCUMENTATION ONLY  Financial Assistance for Glatiramer approved   Source: [facility] MS Advocate   BIN: [id number]  ID: [id number]

## 2019-02-26 NOTE — TELEPHONE ENCOUNTER
"----- Message from Clarice Trejo sent at 2/25/2019  7:00 PM CST -----  FYI: Glatiramer prior authorization has been approved through 02/15/2020. Patient's insurance requires the patient to fill through [facility]. Please send prescription to [facility], which has been added to the patients EPIC profile. Patient has been notified and provided with the necessary info to call and schedule a delivery.    To complete this in EPIC, the original order MUST be discontinued and re-typed as a new prescription with the updated pharmacy listed. Clicking "reorder" will continue to route the rx to [facility] even if the pharmacy is changed. Please opt the patient out of [facility] when the BPA is fired.    Thank you,   Clarice Trejo, [facility]  Patient Advocate  [facility]  [id number]  "

## 2019-02-26 NOTE — TELEPHONE ENCOUNTER
FYI: Glatiramer prior authorization has been approved through 02/15/2020. Patient's insurance requires the patient to fill through [facility]. Please send prescription to [facility], which has been added to the patients EPIC profile. Patient has been notified and provided with the necessary info to call and schedule a delivery.    To complete this in EPIC, the original order MUST be discontinued and re-typed as a new prescription with the updated pharmacy listed. Clicking reorder will continue to route the rx to [facility] even if the pharmacy is changed. Please opt the patient out of [facility] when the BPA is fired.    Thank you,   Clarice Trejo [facility]  Patient Advocate  [facility]  [id number]

## 2019-02-28 ENCOUNTER — PATIENT MESSAGE (OUTPATIENT)
Dept: NEUROLOGY | Facility: CLINIC | Age: 29
End: 2019-02-28

## 2019-04-30 ENCOUNTER — PATIENT MESSAGE (OUTPATIENT)
Dept: NEUROLOGY | Facility: CLINIC | Age: 29
End: 2019-04-30

## 2019-07-19 ENCOUNTER — OFFICE VISIT (OUTPATIENT)
Dept: NEUROLOGY | Facility: CLINIC | Age: 29
End: 2019-07-19
Payer: COMMERCIAL

## 2019-07-19 VITALS
HEIGHT: 67 IN | HEART RATE: 66 BPM | SYSTOLIC BLOOD PRESSURE: 147 MMHG | WEIGHT: 293 LBS | BODY MASS INDEX: 45.99 KG/M2 | DIASTOLIC BLOOD PRESSURE: 81 MMHG

## 2019-07-19 DIAGNOSIS — Z29.89 PROPHYLACTIC IMMUNOTHERAPY: ICD-10-CM

## 2019-07-19 DIAGNOSIS — E66.01 MORBID OBESITY WITH BMI OF 50.0-59.9, ADULT: ICD-10-CM

## 2019-07-19 DIAGNOSIS — G35 MULTIPLE SCLEROSIS: Primary | ICD-10-CM

## 2019-07-19 DIAGNOSIS — R68.89 HEAT SENSITIVITY: ICD-10-CM

## 2019-07-19 DIAGNOSIS — E55.9 VITAMIN D INSUFFICIENCY: ICD-10-CM

## 2019-07-19 DIAGNOSIS — Z71.89 COUNSELING REGARDING GOALS OF CARE: ICD-10-CM

## 2019-07-19 PROCEDURE — 3008F PR BODY MASS INDEX (BMI) DOCUMENTED: ICD-10-PCS | Mod: CPTII,S$GLB,, | Performed by: PHYSICIAN ASSISTANT

## 2019-07-19 PROCEDURE — 99215 OFFICE O/P EST HI 40 MIN: CPT | Mod: S$GLB,,, | Performed by: PHYSICIAN ASSISTANT

## 2019-07-19 PROCEDURE — 3008F BODY MASS INDEX DOCD: CPT | Mod: CPTII,S$GLB,, | Performed by: PHYSICIAN ASSISTANT

## 2019-07-19 PROCEDURE — 99999 PR PBB SHADOW E&M-EST. PATIENT-LVL III: ICD-10-PCS | Mod: PBBFAC,,, | Performed by: PHYSICIAN ASSISTANT

## 2019-07-19 PROCEDURE — 99999 PR PBB SHADOW E&M-EST. PATIENT-LVL III: CPT | Mod: PBBFAC,,, | Performed by: PHYSICIAN ASSISTANT

## 2019-07-19 PROCEDURE — 99215 PR OFFICE/OUTPT VISIT, EST, LEVL V, 40-54 MIN: ICD-10-PCS | Mod: S$GLB,,, | Performed by: PHYSICIAN ASSISTANT

## 2019-07-19 NOTE — PROGRESS NOTES
"Subjective:       Patient ID: Kendy Ryder is a 28 y.o. female who presents today with her  and daughter for a routine clinic visit for MS.      MS HPI:  · DMT: Copaxone(initiated March 23, 2019)  · Side effects from DMT? Yes - some mild injection site reactions-tolerable  · Taking vitamin D3 as recommended? Yes - 6,000IU/d   · Feeling well overall-she is continuing to loose weight(primarily through changes in diet)    SOCIAL HISTORY  Social History     Tobacco Use    Smoking status: Never Smoker    Smokeless tobacco: Never Used   Substance Use Topics    Alcohol use: No     Frequency: Monthly or less     Drinks per session: 1 or 2     Binge frequency: Never    Drug use: No     Living arrangements - the patient lives with their family.      MS ROS:  ·   MS REVIEW OF SYMPTOMS 7/18/2019   Do you feel abnormally tired on most days? No   Do you feel you generally sleep well? Yes   Do you have difficulty controlling your bladder?  No   Do you have difficulty controlling your bowels?  No   Do you have frequent muscle cramps, tightness or spasms in your limbs?  Yes-will wake up occasionally with "horrible" cramping in R LE   Do you have new visual symptoms?  No   Do you have worsening difficulty with your memory or thinking? No   Do you have worsening symptoms of anxiety or depression?  No--states she is [place] usually around her period   For patients who walk, Do you have more difficulty walking?  No   Have you fallen since your last visit?  No   For patients who use wheelchairs: Do you have any skin wounds or breakdown? No   Do you have difficulty using your hands?  No   Do you have shooting or burning pain? No   Do you have difficulty with sexual function?  No   If you are sexually active, are you using birth control? Y/N  N/A No   Do you often choke when swallowing liquids or solid food?  No   Do you experience worsening symptoms when overheated? Yes-speech changes, fatigue   Do you need any new " equipment such as a wheelchair, walker or shower chair? No   Do you receive co-pay financial assistance for your principal MS medicine? Yes   Would you be interested in participating in an MS research trial in the future? Yes   Do you feel you have adequate family/friend support?  Yes   Do you have health insurance?   Yes   Are you currently employed? Yes-[employer]   Do you receive SSDI/SSI?  Not Applicable   Do you use marijuana or cannabis products? Yes   How often? Weekly   Have you been diagnosed with a urinary tract infection since your last visit here? No   Have you been diagnosed with a respiratory tract infection since your last visit here? No   Have you been to the emergency room since your last visit here? No   Have you been hospitalized since your last visit here?  No     FSS SCORE & INTERPRETATION 7/18/2019   FSS SCORE  14   FSS SCORE INTERPRETATION May not be suffering from fatigue     MS SUZANNE-D SCORE & INTERPRETATION 7/18/2019   SUZANNE-D SCORE  1   SUZANNE-D INTERPRETATION  No indication of Depression     MS DEDE-7 SCORE & INTERPRETATION 7/18/2019   DEDE-7 SCORE  2   DEDE-7 SCORE INTERPRETATION Normal     PEQ MS MOS PAIN EFFECTS SCORE & INTERPRETATION 7/18/2019   PES SCORE 7   PES SCORE INTERPRETATION Scores can range from 6-30.  Items are scaled so that higher scores indicate a greater impact of pain on a patients mood and behavior.     PEQ MS SEXUAL SATISFACTION SCORE & INTERPRETATION 7/18/2019   SSS SCORE  6   SSS SCORE INTERPRETATION Scores can range from 4-24.  Higher scores indicate greater problems with sexual satisfaction.     MS BLADDER CONTROL SCORE & INTERPRETATION 7/18/2019   BLCS SCORE 0   BLCS SCORE INTERPRETATION  Scores can range from 0-22, with higher scores indicating greater bladder control problems.     MS BOWEL CONTROL SCORE & INTERPRETATION 7/18/2019   BWCS SCORE 1   BWCS SCORE INTERPRETATION Scores can range from 0-26, with higher scores indicating greater bowel  control problems.     PEQ MS IMPACT OF VISUAL IMPAIRMENT SCORE & INTERPRETATION 7/18/2019   DAYA SCALE SCORE  0   DAYA SCORE INTERPRETATION Scores can range from 0-15, with higher scores indicating greater impact of visual problems on daily activites.     MS PDQ SCORE & INTERPRETATION 7/18/2019   PDQ RETROSPECTIVE MEMORY SUBSCALE 0   PDQ ATTENTION/CONCENTRATION SUBSCALE 4   PDQ PROSPECTIVE MEMORY SUBSCALE 3   PDQ PLANNING/ORGANIZATION SUBSCALE 1   PDQ TOTAL SCORE 8   PDQ SCORE INTERPRETATION Scores can range from 0-80, with higher scores indicating greater perceived cognitive impairment.     MSSS SCORE & INTERPRETATION 7/18/2019   MSSS TANGIBLE SUPPORT SUBSCALE 87.5   MSSS EMOTIONAL/INFORMATIONAL SUPPORT SUBSCALE 90.63   MSSS AFFECTIONATE SUPPORT SUBSCALE 100   MSSS POSITIVE SOCIAL INTERACTION SUBSCALE 91.67   MSSS TOTAL SCORE 92.45   MSSS SCORE INTERPRETATION Scores can range from 0-100, with higher scores indicating greater perceived support.               Objective:        1. 25 foot timed walk: 3.9s toeday3.7s without assist; was 4.6 s last visit    Neurologic Exam     Mental Status   Oriented to person, place, and time.   Follows 3 step commands.   Speech: speech is normal   Level of consciousness: alert  Normal comprehension.     Cranial Nerves     CN II   Visual acuity: normal with correction (20/20 OD and OS with Snellen hand held chart at 6 ft)    CN III, IV, VI   Pupils are equal, round, and reactive to light.  Extraocular motions are normal.     CN V   Facial sensation intact.     CN VII   Facial expression full, symmetric.     CN VIII   Hearing: intact (finger rub)    CN IX, X   Palate: symmetric    CN XI   CN XI normal.     CN XII   Tongue deviation: right (slight)    Motor Exam   Muscle bulk: normal  Overall muscle tone: normal    Strength   Right deltoid: 5/5  Left deltoid: 5/5  Right triceps: 5/5  Left triceps: 5/5  Right wrist extension: 5/5  Left wrist extension: 5/5  Right interossei: 5/5  Left  interossei: 5/5  Right iliopsoas: 5/5  Left iliopsoas: 5/5  Right hamstrin/5  Left hamstrin/5  Right anterior tibial: 5/5  Left anterior tibial: 5/5  Right peroneal: 5/5  Left peroneal: 5/5       Sensory Exam   Light touch normal.   Right arm vibration: normal  Left arm vibration: normal  Right leg vibration: decreased from toes  Left leg vibration: decreased from toes    Gait, Coordination, and Reflexes     Gait  Gait: normal    Coordination   Romberg: negative  Finger to nose coordination: normal  Heel to shin coordination: normal  Tandem walking coordination: abnormal    Tremor   Resting tremor: absent  Action tremor: absent    Reflexes   Right brachioradialis: 2+  Left brachioradialis: 2+  Right biceps: 2+  Left biceps: 2+  Right triceps: 2+  Left triceps: 2+  Right patellar: 2+  Left patellar: 2+  Right achilles: 2+  Left achilles: 2+  Right plantar: normal  Left plantar: normal  Right Hsieh: absent  Left Hsieh: absent  Right ankle clonus: absent  Left ankle clonus: absent  Right pendular knee jerk: absent  Left pendular knee jerk: absent  Intact RSM         Imaging:       Results for orders placed during the hospital encounter of 19   MRI Brain Demyelinating W W/O Contrast    Impression Interval development of enhancing diffusion positive lesion within the left centrum semiovale most compatible with active area of demyelination.    In addition there is a new nonenhancing lesion in the right parietal deep white matter suggestive for interval demyelination.    This is superimposed on similar scattered foci of T2 FLAIR signal abnormality supratentorial white matter suggestive for underlying mild prior demyelinating plaque burden    Clinical correlation and continued follow-up advised      Electronically signed by: Naren Santiago DO  Date:    2019  Time:    12:48     Results for orders placed during the hospital encounter of 19   MRI Cervical Spine Demyelinating W W/O Contrast     Impression Unremarkable motion limited MRI of the cervical spine as detailed above      Electronically signed by: Naren Santiago DO  Date:    01/18/2019  Time:    12:53     No results found for this or any previous visit.    Labs:     Lab Results   Component Value Date    [id number] 46 02/06/2019    MGNQSGQF17EO 26 (L) 02/14/2018     Lab Results   Component Value Date    JCVINDEX 0.80 (A) 02/14/2018    JCVANTIBODY Positive (A) 02/14/2018     Lab Results   Component Value Date    TP7NNSEV 70.7 02/06/2019    ABSOLUTECD3 3070 (H) 02/06/2019    EV4KBSFX 14.5 02/06/2019    ABSOLUTECD8 630 02/06/2019    FE6IRTFX 54.2 02/06/2019    ABSOLUTECD4 2350 (H) 02/06/2019    LABCD48 3.73 (H) 02/06/2019     Lab Results   Component Value Date    WBC 12.97 (H) 01/18/2019    RBC 5.11 01/18/2019    HGB 10.4 (L) 01/18/2019    HCT 36.4 (L) 01/18/2019    MCV 71 (L) 01/18/2019    MCH 20.4 (L) 01/18/2019    MCHC 28.6 (L) 01/18/2019    RDW 18.7 (H) 01/18/2019     (H) 01/18/2019    MPV 10.0 01/18/2019    GRAN 8.1 (H) 01/18/2019    GRAN 62.5 01/18/2019    LYMPH 4.1 01/18/2019    LYMPH 31.3 01/18/2019    MONO 0.6 01/18/2019    MONO 4.9 01/18/2019    EOS 0.1 01/18/2019    BASO 0.06 01/18/2019    EOSINOPHIL 0.4 01/18/2019    BASOPHIL 0.5 01/18/2019     Sodium   Date Value Ref Range Status   01/16/2019 137 136 - 145 mmol/L Final     Potassium   Date Value Ref Range Status   01/16/2019 3.8 3.5 - 5.1 mmol/L Final     Chloride   Date Value Ref Range Status   01/16/2019 106 95 - 110 mmol/L Final     CO2   Date Value Ref Range Status   01/16/2019 23 23 - 29 mmol/L Final     Glucose   Date Value Ref Range Status   01/16/2019 85 70 - 110 mg/dL Final     BUN, Bld   Date Value Ref Range Status   01/16/2019 8 6 - 20 mg/dL Final     Creatinine   Date Value Ref Range Status   01/16/2019 0.8 0.5 - 1.4 mg/dL Final     Calcium   Date Value Ref Range Status   01/16/2019 9.2 8.7 - 10.5 mg/dL Final     Total Protein   Date Value Ref Range Status   01/16/2019  7.3 6.0 - 8.4 g/dL Final     Albumin   Date Value Ref Range Status   01/16/2019 3.6 3.5 - 5.2 g/dL Final     Total Bilirubin   Date Value Ref Range Status   01/16/2019 0.3 0.1 - 1.0 mg/dL Final     Comment:     For infants and newborns, interpretation of results should be based  on gestational age, weight and in agreement with clinical  observations.  Premature Infant recommended reference ranges:  Up to 24 hours.............<8.0 mg/dL  Up to 48 hours............<12.0 mg/dL  3-5 days..................<15.0 mg/dL  6-29 days.................<15.0 mg/dL       Alkaline Phosphatase   Date Value Ref Range Status   01/16/2019 61 55 - 135 U/L Final     AST   Date Value Ref Range Status   01/16/2019 21 10 - 40 U/L Final     ALT   Date Value Ref Range Status   01/16/2019 17 10 - 44 U/L Final     Anion Gap   Date Value Ref Range Status   01/16/2019 8 8 - 16 mmol/L Final     eGFR if    Date Value Ref Range Status   01/16/2019 >60 >60 mL/min/1.73 m^2 Final     eGFR if non    Date Value Ref Range Status   01/16/2019 >60 >60 mL/min/1.73 m^2 Final     Comment:     Calculation used to obtain the estimated glomerular filtration  rate (eGFR) is the CKD-EPI equation.            Diagnosis/Assessment/Plan:    1. Multiple Sclerosis  · Assessment: clinically Stable on Copaxone and high dose Vit D3. Discussed signs/symptoms of relapse vs. Pseudo relapse.  · Imaging: will plan for 6 month interval MRI in Sept(ordered today)  · Disease Modifying Therapies: Continue Copaxone and high dose Vit D3(will check Vit D3 level next visit.    2. MS Symptom Assessment / Management  · Fatigue: CoQ10(400mg) for MS related fatigue  · Spasticity: encouraged stretching/yoga before bed, consistent exercise  · Mood Disorder: meditation  · Heat sensitivity: provided with MSAA application and order today along with cooling towels. 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           Our visit today lasted 40 minutes, and  100% of this time was spent face to face with the patient. Over 50% of this visit included discussion of the treatment plan/medication changes/symptom management/exam findings/imaging results/coordination of care. The patient agrees with the plan of care.   Follow up in about 3 months (around 10/19/2019) for follow up with me.  Patient agreed to POC today.    Attending, Dr. Fall, was available during today's encounter.     Noris Teague PA-C  [facility]        Problem List Items Addressed This Visit        Neuro    Multiple sclerosis - Primary    Relevant Orders    HME - OTHER    MRI Brain Demyelinating W W/O Contrast       Endocrine    Morbid obesity with BMI of 50.0-59.9, adult    Vitamin D insufficiency       Other    Counseling regarding goals of care      Other Visit Diagnoses     Heat sensitivity        Relevant Orders    HME - OTHER    Prophylactic immunotherapy

## 2019-07-29 DIAGNOSIS — G35 MULTIPLE SCLEROSIS: ICD-10-CM

## 2019-07-31 RX ORDER — GLATIRAMER 40 MG/ML
40 INJECTION, SOLUTION SUBCUTANEOUS
Qty: 36 SYRINGE | Refills: 1 | Status: SHIPPED | OUTPATIENT
Start: 2019-07-31 | End: 2020-01-16 | Stop reason: SDUPTHER

## 2019-08-26 ENCOUNTER — OFFICE VISIT (OUTPATIENT)
Dept: INTERNAL MEDICINE | Facility: CLINIC | Age: 29
End: 2019-08-26
Payer: COMMERCIAL

## 2019-08-26 VITALS
SYSTOLIC BLOOD PRESSURE: 128 MMHG | TEMPERATURE: 98 F | HEIGHT: 67 IN | BODY MASS INDEX: 45.99 KG/M2 | WEIGHT: 293 LBS | DIASTOLIC BLOOD PRESSURE: 88 MMHG | HEART RATE: 95 BPM | OXYGEN SATURATION: 99 %

## 2019-08-26 DIAGNOSIS — J01.00 ACUTE NON-RECURRENT MAXILLARY SINUSITIS: Primary | ICD-10-CM

## 2019-08-26 DIAGNOSIS — R05.9 COUGH: ICD-10-CM

## 2019-08-26 PROCEDURE — 99999 PR PBB SHADOW E&M-EST. PATIENT-LVL III: ICD-10-PCS | Mod: PBBFAC,,, | Performed by: FAMILY MEDICINE

## 2019-08-26 PROCEDURE — 96372 THER/PROPH/DIAG INJ SC/IM: CPT | Mod: S$GLB,,, | Performed by: FAMILY MEDICINE

## 2019-08-26 PROCEDURE — 3008F PR BODY MASS INDEX (BMI) DOCUMENTED: ICD-10-PCS | Mod: CPTII,S$GLB,, | Performed by: FAMILY MEDICINE

## 2019-08-26 PROCEDURE — 3008F BODY MASS INDEX DOCD: CPT | Mod: CPTII,S$GLB,, | Performed by: FAMILY MEDICINE

## 2019-08-26 PROCEDURE — 99214 OFFICE O/P EST MOD 30 MIN: CPT | Mod: 25,S$GLB,, | Performed by: FAMILY MEDICINE

## 2019-08-26 PROCEDURE — 96372 PR INJECTION,THERAP/PROPH/DIAG2ST, IM OR SUBCUT: ICD-10-PCS | Mod: S$GLB,,, | Performed by: FAMILY MEDICINE

## 2019-08-26 PROCEDURE — 99999 PR PBB SHADOW E&M-EST. PATIENT-LVL III: CPT | Mod: PBBFAC,,, | Performed by: FAMILY MEDICINE

## 2019-08-26 PROCEDURE — 99214 PR OFFICE/OUTPT VISIT, EST, LEVL IV, 30-39 MIN: ICD-10-PCS | Mod: 25,S$GLB,, | Performed by: FAMILY MEDICINE

## 2019-08-26 RX ORDER — AMOXICILLIN AND CLAVULANATE POTASSIUM 500; 125 MG/1; MG/1
1 TABLET, FILM COATED ORAL 2 TIMES DAILY
Qty: 20 TABLET | Refills: 0 | Status: SHIPPED | OUTPATIENT
Start: 2019-08-26 | End: 2019-10-02 | Stop reason: ALTCHOICE

## 2019-08-26 RX ORDER — TRIAMCINOLONE ACETONIDE 40 MG/ML
40 INJECTION, SUSPENSION INTRA-ARTICULAR; INTRAMUSCULAR ONCE
Status: COMPLETED | OUTPATIENT
Start: 2019-08-26 | End: 2019-08-26

## 2019-08-26 RX ORDER — METHYLPREDNISOLONE 4 MG/1
TABLET ORAL
Qty: 1 PACKAGE | Refills: 0 | Status: SHIPPED | OUTPATIENT
Start: 2019-08-26 | End: 2019-10-02 | Stop reason: ALTCHOICE

## 2019-08-26 RX ADMIN — TRIAMCINOLONE ACETONIDE 40 MG: 40 INJECTION, SUSPENSION INTRA-ARTICULAR; INTRAMUSCULAR at 08:08

## 2019-08-26 NOTE — PROGRESS NOTES
Subjective:       Patient ID: Kendy Ryder is a 28 y.o. female.    Chief Complaint: Cough and Nasal Congestion    Sinusitis:    Pt is a 28 year old who is her for sinus congestion and drainage. Pt reports been going on for about 4 days and not gone away. No fever but cough throughout.     Review of Systems   Constitutional: Negative for activity change and unexpected weight change.   HENT: Positive for rhinorrhea, sinus pressure and sinus pain. Negative for hearing loss and trouble swallowing.    Eyes: Negative for discharge and visual disturbance.   Respiratory: Positive for cough. Negative for chest tightness, shortness of breath and wheezing.    Cardiovascular: Negative for chest pain and palpitations.   Gastrointestinal: Negative for blood in stool, constipation, diarrhea and vomiting.   Endocrine: Negative for polydipsia and polyuria.   Genitourinary: Negative for difficulty urinating, dysuria, hematuria and menstrual problem.   Musculoskeletal: Negative for arthralgias, joint swelling and neck pain.   Neurological: Negative for weakness and headaches.   Psychiatric/Behavioral: Negative for confusion and dysphoric mood.       Objective:      Physical Exam   Constitutional: She appears well-developed and well-nourished.   HENT:   Right Ear: Tympanic membrane is erythematous. A middle ear effusion is present.   Left Ear: Tympanic membrane is erythematous. A middle ear effusion is present.   Nose: Mucosal edema and rhinorrhea present. Right sinus exhibits maxillary sinus tenderness. Left sinus exhibits maxillary sinus tenderness.   Mouth/Throat: Posterior oropharyngeal erythema present.   Cardiovascular: Normal rate and regular rhythm. Exam reveals no friction rub.   No murmur heard.  Pulmonary/Chest: Effort normal and breath sounds normal. She has no wheezes.   Abdominal: Soft. Bowel sounds are normal.   Skin: Skin is dry.   Psychiatric: She has a normal mood and affect. Her behavior is normal.        Assessment:       1. Acute non-recurrent maxillary sinusitis    2. Cough        Plan:       Acute non-recurrent maxillary sinusitis  Comments:  Will start on Augmentin with Steroid shot today. medrol dose pack later in week if not better.    Cough  Comments:  OTC Mucinex DM with antihistamine  Orders:  -     amoxicillin-clavulanate 500-125mg (AUGMENTIN) 500-125 mg Tab; Take 1 tablet (500 mg total) by mouth 2 (two) times daily.  Dispense: 20 tablet; Refill: 0    Other orders  -     triamcinolone acetonide injection 40 mg  -     methylPREDNISolone (MEDROL DOSEPACK) 4 mg tablet; use as directed  Dispense: 1 Package; Refill: 0

## 2019-08-29 ENCOUNTER — PATIENT MESSAGE (OUTPATIENT)
Dept: INTERNAL MEDICINE | Facility: CLINIC | Age: 29
End: 2019-08-29

## 2019-09-19 ENCOUNTER — TELEPHONE (OUTPATIENT)
Dept: RADIOLOGY | Facility: HOSPITAL | Age: 29
End: 2019-09-19

## 2019-09-21 ENCOUNTER — HOSPITAL ENCOUNTER (OUTPATIENT)
Dept: RADIOLOGY | Facility: HOSPITAL | Age: 29
Discharge: HOME | End: 2019-09-21
Attending: PHYSICIAN ASSISTANT
Payer: COMMERCIAL

## 2019-09-21 DIAGNOSIS — G35 MULTIPLE SCLEROSIS: ICD-10-CM

## 2019-09-21 PROCEDURE — 70553 MRI BRAIN DEMYELINATING W/ WO CONTRAST: ICD-10-PCS | Mod: 26,,, | Performed by: RADIOLOGY

## 2019-09-21 PROCEDURE — A9585 GADOBUTROL INJECTION: HCPCS | Performed by: PHYSICIAN ASSISTANT

## 2019-09-21 PROCEDURE — 70553 MRI BRAIN STEM W/O & W/DYE: CPT | Mod: 26,,, | Performed by: RADIOLOGY

## 2019-09-21 PROCEDURE — 70553 MRI BRAIN STEM W/O & W/DYE: CPT | Mod: TC

## 2019-09-21 PROCEDURE — 25500020 PHARM REV CODE 255: Performed by: PHYSICIAN ASSISTANT

## 2019-09-21 RX ORDER — GADOBUTROL 604.72 MG/ML
10 INJECTION INTRAVENOUS
Status: COMPLETED | OUTPATIENT
Start: 2019-09-21 | End: 2019-09-21

## 2019-09-21 RX ADMIN — GADOBUTROL 10 ML: 604.72 INJECTION INTRAVENOUS at 09:09

## 2019-09-25 ENCOUNTER — PATIENT MESSAGE (OUTPATIENT)
Dept: NEUROLOGY | Facility: CLINIC | Age: 29
End: 2019-09-25

## 2019-10-02 ENCOUNTER — LAB VISIT (OUTPATIENT)
Dept: LAB | Facility: HOSPITAL | Age: 29
End: 2019-10-02
Payer: COMMERCIAL

## 2019-10-02 ENCOUNTER — OFFICE VISIT (OUTPATIENT)
Dept: NEUROLOGY | Facility: CLINIC | Age: 29
End: 2019-10-02
Payer: COMMERCIAL

## 2019-10-02 VITALS
HEIGHT: 67 IN | BODY MASS INDEX: 45.99 KG/M2 | DIASTOLIC BLOOD PRESSURE: 94 MMHG | WEIGHT: 293 LBS | SYSTOLIC BLOOD PRESSURE: 145 MMHG | HEART RATE: 96 BPM

## 2019-10-02 DIAGNOSIS — E66.01 MORBID OBESITY WITH BMI OF 50.0-59.9, ADULT: ICD-10-CM

## 2019-10-02 DIAGNOSIS — E55.9 VITAMIN D INSUFFICIENCY: ICD-10-CM

## 2019-10-02 DIAGNOSIS — Z71.89 COUNSELING REGARDING GOALS OF CARE: ICD-10-CM

## 2019-10-02 DIAGNOSIS — G35 MULTIPLE SCLEROSIS: Primary | ICD-10-CM

## 2019-10-02 PROBLEM — R47.1 DYSARTHRIA AND ANARTHRIA: Status: RESOLVED | Noted: 2019-01-16 | Resolved: 2019-10-02

## 2019-10-02 PROBLEM — R20.0 NUMBNESS ON RIGHT SIDE: Status: RESOLVED | Noted: 2017-08-04 | Resolved: 2019-10-02

## 2019-10-02 LAB — 25(OH)D3+25(OH)D2 SERPL-MCNC: 66 NG/ML (ref 30–96)

## 2019-10-02 PROCEDURE — 99999 PR PBB SHADOW E&M-EST. PATIENT-LVL III: CPT | Mod: PBBFAC,,, | Performed by: PHYSICIAN ASSISTANT

## 2019-10-02 PROCEDURE — 36415 COLL VENOUS BLD VENIPUNCTURE: CPT

## 2019-10-02 PROCEDURE — 99215 PR OFFICE/OUTPT VISIT, EST, LEVL V, 40-54 MIN: ICD-10-PCS | Mod: S$GLB,,, | Performed by: PHYSICIAN ASSISTANT

## 2019-10-02 PROCEDURE — 99215 OFFICE O/P EST HI 40 MIN: CPT | Mod: S$GLB,,, | Performed by: PHYSICIAN ASSISTANT

## 2019-10-02 PROCEDURE — 3008F BODY MASS INDEX DOCD: CPT | Mod: CPTII,S$GLB,, | Performed by: PHYSICIAN ASSISTANT

## 2019-10-02 PROCEDURE — 82306 VITAMIN D 25 HYDROXY: CPT

## 2019-10-02 PROCEDURE — 99999 PR PBB SHADOW E&M-EST. PATIENT-LVL III: ICD-10-PCS | Mod: PBBFAC,,, | Performed by: PHYSICIAN ASSISTANT

## 2019-10-02 PROCEDURE — 3008F PR BODY MASS INDEX (BMI) DOCUMENTED: ICD-10-PCS | Mod: CPTII,S$GLB,, | Performed by: PHYSICIAN ASSISTANT

## 2019-10-02 NOTE — ASSESSMENT & PLAN NOTE
Stable clinically; however, with small enhancing lesion on Brain MRI(6 month interval on Glatiramer)  Will repeat MRI in 3 months-if not stable will consider a change in DMT(she is considering pregnancy in less than a year)

## 2019-10-02 NOTE — PROGRESS NOTES
"Subjective:        Patient ID: Kendy Ryder is a 28 y.o. female who presents today with her  for a routine clinic visit for MS.      MS HPI:  · DMT: glatiramer acetate  · Side effects from DMT? Yes - very mild burning-but tolerable  · Taking vitamin D3 as recommended? Yes - 10,000IU/d-since July of this year:   · Fell well currently  · Just completed "comps" written and has oral "comps" tomorrow to then start working on her dissertion for her doctorate    Medications:  Current Outpatient Medications   Medication Sig    alpha lipoic acid 100 mg Cap Take by mouth.    amoxicillin-clavulanate 500-125mg (AUGMENTIN) 500-125 mg Tab Take 1 tablet (500 mg total) by mouth 2 (two) times daily.    biotin 10,000 mcg Cap Take by mouth.    cholecalciferol, vitamin D3, (VITAMIN D3) 5,000 unit Tab Take 10,000 Units by mouth once daily.    glatiramer (COPAXONE) 40 mg/mL Syrg injection Inject 40 mg into the skin 3 (three) times a week.    methylPREDNISolone (MEDROL DOSEPACK) 4 mg tablet use as directed     No current facility-administered medications for this visit.        SOCIAL HISTORY  Social History     Tobacco Use    Smoking status: Never Smoker    Smokeless tobacco: Never Used   Substance Use Topics    Alcohol use: No     Frequency: Monthly or less     Drinks per session: 1 or 2     Binge frequency: Never    Drug use: No       Living arrangements - the patient lives with their family.    MS ROS:    Do you feel abnormally tied on most days? (P) No   Do you feel you generally sleep well? (P) Yes    Do you have difficulty controlling your bladder? (P) No   Do you have difficulty controlling your bowels? (P) No  Do you have frequent muscle cramps, tightness or spasms in your limbs? (P) No  Do you have new visual symptoms? (P) No  Do you have worsening difficulty with your memory or thinking? (P) No  Do you have worsening symptoms of anxiety or depression? (P) No  Do you have more difficulty walking? (P) " No  Have you fallen since your last visit? (P) No  For patients who use wheelchairs: Do you have any skin wounds or breakdown? (P) Not Applicable  Do you have difficulty using your hands? (P) No  Do you have shooting or burning pain? (P) No  Do you have difficulty with sexual function? (P) No  If you are sexually active, are you using birth control? (P) No  Do you often choke when swallowing liquids or solid food? (P) No  Do you experience worsening symptoms when overheated? (P) No  Do you need any new equipment such as a wheelchair, walker or shower chair? (P) No  Do you receive co-pay financial assistance for your principal MS medicine? (P) Yes  Would you be interested in participating in an MS research trial in the future? (P) Yes  For patients on Gilenya, Tecfidera, Aubagio, Rituxan, Ocrevus, Tysabri, Lemtrada or Methotrexate, are you aware that you should NOT receive live virus vaccines?N/A    Do you feel you have adequate family/friend support? (P) Yes  Do you have health insurance?  (P) Yes  Are you currently employed?  (P) Yes  Do you have high out of pocket medical expenses that limit your ability to access appropriate treatment/testing (ie. therapy services, MRIs)?  (P) Not Applicable  Do you use marijuana or cannabis products? (P) Yes--using CBD drops not marijuana  Have you been diagnosed with a urinary tract infection since your last visit here? (P) No  Have you been diagnosed with a respiratory tract infection since your last visit here? (P) No  Have you been to the emergency room since your last visit here? (P) No  Have you been hospitalized since your last visit here? (P) No           Objective:        1. 25 foot timed walk:  Timed 25 Foot Walk: 10/2/2019   Did patient wear an AFO? No   Was assistive device used? No   Time for 25 Foot Walk (seconds) 3.6   Time for 25 Foot Walk (seconds) 3.7         Neurologic Exam     Mental Status   Oriented to person, place, and time.   Follows 3 step commands.    Speech: speech is normal   Level of consciousness: alert  Normal comprehension.     Cranial Nerves     CN II   Visual acuity: normal with correction (20/20 OD and OS with hand held chart at 14 inches)    CN III, IV, VI   Pupils are equal, round, and reactive to light.  Extraocular motions are normal.     CN V   Facial sensation intact.     CN VII   Facial expression full, symmetric.     CN VIII   Hearing: intact (finger rub)    CN IX, X   Palate: symmetric    CN XI   CN XI normal.     CN XII   Tongue deviation: right (slight)    Motor Exam   Muscle bulk: normal  Overall muscle tone: normal    Strength   Right deltoid: 5/5  Left deltoid: 5/5  Right triceps: 5/5  Left triceps: 5/5  Right wrist extension: 5/5  Left wrist extension: 5/5  Right interossei: 5/5  Left interossei: 5/5  Right iliopsoas: 5/5  Left iliopsoas: 5/5  Right hamstrin/5  Left hamstrin/5  Right anterior tibial: 5/5  Left anterior tibial: 5/5  Right peroneal: 5/5  Left peroneal: 5/5       Sensory Exam   Light touch normal.   Right arm vibration: normal  Left arm vibration: normal  Right leg vibration: decreased from toes  Left leg vibration: decreased from toes    Gait, Coordination, and Reflexes     Gait  Gait: normal    Coordination   Romberg: negative  Finger to nose coordination: normal  Heel to shin coordination: normal  Tandem walking coordination: abnormal    Tremor   Resting tremor: absent  Action tremor: absent    Reflexes   Right brachioradialis: 2+  Left brachioradialis: 2+  Right biceps: 2+  Left biceps: 2+  Right triceps: 2+  Left triceps: 2+  Right patellar: 2+  Left patellar: 2+  Right achilles: 2+  Left achilles: 2+  Right plantar: normal  Left plantar: normal  Right Hsieh: absent  Left Hsieh: absent  Right ankle clonus: absent  Left ankle clonus: absent  Right pendular knee jerk: absent  Left pendular knee jerk: absent  Intact RSM  Able to toe/heel walk  Some mild difficulty with hopping on each foot individually          Imaging:       Results for orders placed during the hospital encounter of 09/21/19   MRI Brain Demyelinating W W/O Contrast    Impression Findings in the cerebral white matter which are typical for multiple sclerosis.  Single new 3-4 mm area of enhancement in left pericallosal region near the genu of the corpus callosum that is suspicious for a small active plaque.      Electronically signed by: Wayne Echavarria,   Date:    09/23/2019  Time:    08:52     Results for orders placed during the hospital encounter of 01/18/19   MRI Cervical Spine Demyelinating W W/O Contrast    Impression Unremarkable motion limited MRI of the cervical spine as detailed above      Electronically signed by: Naren Santiago,   Date:    01/18/2019  Time:    12:53     No results found for this or any previous visit.      Labs:     Lab Results   Component Value Date    KXJVKEVK37TA 66 10/02/2019    NZETVHXL81IW 46 02/06/2019    UPMSQNHG02UB 26 (L) 02/14/2018     Lab Results   Component Value Date    JCVINDEX 0.80 (A) 02/14/2018    JCVANTIBODY Positive (A) 02/14/2018     Lab Results   Component Value Date    SV3DXVMJ 70.7 02/06/2019    ABSOLUTECD3 3070 (H) 02/06/2019    AI7CIUPP 14.5 02/06/2019    ABSOLUTECD8 630 02/06/2019    VH0MUNGJ 54.2 02/06/2019    ABSOLUTECD4 2350 (H) 02/06/2019    LABCD48 3.73 (H) 02/06/2019     Lab Results   Component Value Date    WBC 12.97 (H) 01/18/2019    RBC 5.11 01/18/2019    HGB 10.4 (L) 01/18/2019    HCT 36.4 (L) 01/18/2019    MCV 71 (L) 01/18/2019    MCH 20.4 (L) 01/18/2019    MCHC 28.6 (L) 01/18/2019    RDW 18.7 (H) 01/18/2019     (H) 01/18/2019    MPV 10.0 01/18/2019    GRAN 8.1 (H) 01/18/2019    GRAN 62.5 01/18/2019    LYMPH 4.1 01/18/2019    LYMPH 31.3 01/18/2019    MONO 0.6 01/18/2019    MONO 4.9 01/18/2019    EOS 0.1 01/18/2019    BASO 0.06 01/18/2019    EOSINOPHIL 0.4 01/18/2019    BASOPHIL 0.5 01/18/2019     Sodium   Date Value Ref Range Status   01/16/2019 137 136 - 145 mmol/L Final      Potassium   Date Value Ref Range Status   01/16/2019 3.8 3.5 - 5.1 mmol/L Final     Chloride   Date Value Ref Range Status   01/16/2019 106 95 - 110 mmol/L Final     CO2   Date Value Ref Range Status   01/16/2019 23 23 - 29 mmol/L Final     Glucose   Date Value Ref Range Status   01/16/2019 85 70 - 110 mg/dL Final     BUN, Bld   Date Value Ref Range Status   01/16/2019 8 6 - 20 mg/dL Final     Creatinine   Date Value Ref Range Status   01/16/2019 0.8 0.5 - 1.4 mg/dL Final     Calcium   Date Value Ref Range Status   01/16/2019 9.2 8.7 - 10.5 mg/dL Final     Total Protein   Date Value Ref Range Status   01/16/2019 7.3 6.0 - 8.4 g/dL Final     Albumin   Date Value Ref Range Status   01/16/2019 3.6 3.5 - 5.2 g/dL Final     Total Bilirubin   Date Value Ref Range Status   01/16/2019 0.3 0.1 - 1.0 mg/dL Final     Comment:     For infants and newborns, interpretation of results should be based  on gestational age, weight and in agreement with clinical  observations.  Premature Infant recommended reference ranges:  Up to 24 hours.............<8.0 mg/dL  Up to 48 hours............<12.0 mg/dL  3-5 days..................<15.0 mg/dL  6-29 days.................<15.0 mg/dL       Alkaline Phosphatase   Date Value Ref Range Status   01/16/2019 61 55 - 135 U/L Final     AST   Date Value Ref Range Status   01/16/2019 21 10 - 40 U/L Final     ALT   Date Value Ref Range Status   01/16/2019 17 10 - 44 U/L Final     Anion Gap   Date Value Ref Range Status   01/16/2019 8 8 - 16 mmol/L Final     eGFR if    Date Value Ref Range Status   01/16/2019 >60 >60 mL/min/1.73 m^2 Final     eGFR if non    Date Value Ref Range Status   01/16/2019 >60 >60 mL/min/1.73 m^2 Final     Comment:     Calculation used to obtain the estimated glomerular filtration  rate (eGFR) is the CKD-EPI equation.         No results found for: HEPBSAG, HEPBSAB, HEPBCAB      Impression:       Labs reviewed: N/A  Imaging tests reviewed:  Yes    Diagnosis of MS: Yes      MS Diagnosis based on:  Clinical attacks (relapses): 1 January 2019-off DMT  Objective Clinical Evidence of lesions: >2  MRI distribution in space: MS typical regions with >1 T2 lesion:  Periventricular and Juxtacortical  CSF: Positive            2017    Progression:  Number of relapses in the past year:  1  Clinical Progression:  Clinically Stable  MRI Progression:  Worsened (new lesions)            6 month interval MRI on Glatiramer with one small mildly enhancing lesion    Plan:            Will continue with Glatiramer and repeat MRI in 3 months   Monitoring labs ordered?:  Yes   Imaging labs ordered?:  Yes    Additional Impression:          Over 50% of this 40 minute visit was spent in direct face to face counseling of the patient and her  about MS, DMT considerations, MRI review and MS symptom management.   Follow up in about 3 months (around 1/2/2020) for follow up with Dr. Fall.  Patient agreed to POC today.    Attending, Dr. Fall, was available during today's encounter.         Problem List Items Addressed This Visit        Neuro    Multiple sclerosis - Primary     Stable clinically; however, with small enhancing lesion on Brain MRI(6 month interval on Glatiramer)  Will repeat MRI in 3 months-if not stable will consider a change in DMT(she is considering pregnancy in less than a year)         Relevant Orders    MRI Brain Demyelinating W W/O Contrast       Endocrine    Morbid obesity with BMI of 50.0-59.9, adult    Vitamin D insufficiency     Currently taking 10,000IU/d-will recheck level today         Relevant Orders    Vitamin D (Completed)       Other    Counseling regarding goals of care          Noris Teague PA-C

## 2020-01-08 ENCOUNTER — TELEPHONE (OUTPATIENT)
Dept: RADIOLOGY | Facility: HOSPITAL | Age: 30
End: 2020-01-08

## 2020-01-10 ENCOUNTER — TELEPHONE (OUTPATIENT)
Dept: RADIOLOGY | Facility: HOSPITAL | Age: 30
End: 2020-01-10

## 2020-01-16 DIAGNOSIS — G35 MULTIPLE SCLEROSIS: ICD-10-CM

## 2020-01-16 RX ORDER — GLATIRAMER 40 MG/ML
40 INJECTION, SOLUTION SUBCUTANEOUS
Qty: 36 SYRINGE | Refills: 1 | Status: SHIPPED | OUTPATIENT
Start: 2020-01-17 | End: 2020-07-06 | Stop reason: SDUPTHER

## 2020-02-15 ENCOUNTER — TELEPHONE (OUTPATIENT)
Dept: RADIOLOGY | Facility: HOSPITAL | Age: 30
End: 2020-02-15

## 2020-02-17 ENCOUNTER — HOSPITAL ENCOUNTER (OUTPATIENT)
Dept: RADIOLOGY | Facility: HOSPITAL | Age: 30
Discharge: HOME | End: 2020-02-17
Attending: PHYSICIAN ASSISTANT
Payer: COMMERCIAL

## 2020-02-17 DIAGNOSIS — G35 MULTIPLE SCLEROSIS: ICD-10-CM

## 2020-02-17 PROCEDURE — A9585 GADOBUTROL INJECTION: HCPCS | Performed by: PHYSICIAN ASSISTANT

## 2020-02-17 PROCEDURE — 70553 MRI BRAIN DEMYELINATING W/ WO CONTRAST: ICD-10-PCS | Mod: 26,,, | Performed by: RADIOLOGY

## 2020-02-17 PROCEDURE — 70553 MRI BRAIN STEM W/O & W/DYE: CPT | Mod: TC

## 2020-02-17 PROCEDURE — 70553 MRI BRAIN STEM W/O & W/DYE: CPT | Mod: 26,,, | Performed by: RADIOLOGY

## 2020-02-17 PROCEDURE — 25500020 PHARM REV CODE 255: Performed by: PHYSICIAN ASSISTANT

## 2020-02-17 RX ORDER — GADOBUTROL 604.72 MG/ML
10 INJECTION INTRAVENOUS
Status: COMPLETED | OUTPATIENT
Start: 2020-02-17 | End: 2020-02-17

## 2020-02-17 RX ADMIN — GADOBUTROL 10 ML: 604.72 INJECTION INTRAVENOUS at 05:02

## 2020-02-18 ENCOUNTER — OFFICE VISIT (OUTPATIENT)
Dept: NEUROLOGY | Facility: CLINIC | Age: 30
End: 2020-02-18
Payer: COMMERCIAL

## 2020-02-18 VITALS
SYSTOLIC BLOOD PRESSURE: 163 MMHG | HEIGHT: 67 IN | DIASTOLIC BLOOD PRESSURE: 100 MMHG | HEART RATE: 79 BPM | WEIGHT: 293 LBS | BODY MASS INDEX: 45.99 KG/M2

## 2020-02-18 DIAGNOSIS — Z29.89 PROPHYLACTIC IMMUNOTHERAPY: ICD-10-CM

## 2020-02-18 DIAGNOSIS — R90.89 ABNORMAL FINDING ON MRI OF BRAIN: ICD-10-CM

## 2020-02-18 DIAGNOSIS — G35 MULTIPLE SCLEROSIS: Primary | ICD-10-CM

## 2020-02-18 DIAGNOSIS — Z71.89 COUNSELING REGARDING GOALS OF CARE: ICD-10-CM

## 2020-02-18 PROCEDURE — 99999 PR PBB SHADOW E&M-EST. PATIENT-LVL III: CPT | Mod: PBBFAC,,, | Performed by: PSYCHIATRY & NEUROLOGY

## 2020-02-18 PROCEDURE — 99214 PR OFFICE/OUTPT VISIT, EST, LEVL IV, 30-39 MIN: ICD-10-PCS | Mod: S$GLB,,, | Performed by: PSYCHIATRY & NEUROLOGY

## 2020-02-18 PROCEDURE — 3008F PR BODY MASS INDEX (BMI) DOCUMENTED: ICD-10-PCS | Mod: CPTII,S$GLB,, | Performed by: PSYCHIATRY & NEUROLOGY

## 2020-02-18 PROCEDURE — 99999 PR PBB SHADOW E&M-EST. PATIENT-LVL III: ICD-10-PCS | Mod: PBBFAC,,, | Performed by: PSYCHIATRY & NEUROLOGY

## 2020-02-18 PROCEDURE — 99214 OFFICE O/P EST MOD 30 MIN: CPT | Mod: S$GLB,,, | Performed by: PSYCHIATRY & NEUROLOGY

## 2020-02-18 PROCEDURE — 3008F BODY MASS INDEX DOCD: CPT | Mod: CPTII,S$GLB,, | Performed by: PSYCHIATRY & NEUROLOGY

## 2020-02-18 NOTE — PROGRESS NOTES
"Subjective:          Patient ID: Kendy Ryder is a 29 y.o. female who presents today for a routine clinic visit for MS.      MS HPI:  · DMT: glatiramer acetate Copaxone  · Side effects from DMT? Yes - mild ISR;    · Taking vitamin D3 as recommended? Yes - 10,000 IU/day    · She is feeling stable; no sense of relapse;   · No sense of progressive disability;   · Occasionally "strains" when she uses the bathroom with BMs, and has to "push" more;   · No UTIs   · Currently in school--getting PhD in Curriculum Instruction;   · Works as an  full time--teaches teachers how to teach;   · Currently not trying to conceive, but hoping to try in 2021    Medications:  Current Outpatient Medications   Medication Sig    alpha lipoic acid 100 mg Cap Take by mouth.    biotin 10,000 mcg Cap Take by mouth.    cholecalciferol, vitamin D3, (VITAMIN D3) 5,000 unit Tab Take 10,000 Units by mouth once daily.    glatiramer (COPAXONE, GLATOPA) 40 mg/mL injection Inject 40 mg into the skin 3 (three) times a week.     No current facility-administered medications for this visit.        SOCIAL HISTORY  Social History     Tobacco Use    Smoking status: Never Smoker    Smokeless tobacco: Never Used   Substance Use Topics    Alcohol use: No     Frequency: Monthly or less     Drinks per session: 1 or 2     Binge frequency: Never    Drug use: No       Living arrangements - the patient lives with their spouse.    ROS:    REVIEW OF SYMPTOMS 2/17/2020   Do you feel abnormally tired on most days? No   Do you feel you generally sleep well? Yes   Do you have difficulty controlling your bladder?  No   Do you have difficulty controlling your bowels?  No   Do you have frequent muscle cramps, tightness or spasms in your limbs?  No   Do you have new visual symptoms?  No   Do you have worsening difficulty with your memory or thinking? No   Do you have worsening symptoms of anxiety or depression?  No   For patients who walk, " Do you have more difficulty walking?  No   Have you fallen since your last visit?  No   For patients who use wheelchairs: Do you have any skin wounds or breakdown? No   Do you have difficulty using your hands?  No   Do you have shooting or burning pain? No   Do you have difficulty with sexual function?  No   If you are sexually active, are you using birth control? Y/N  N/A No   Do you often choke when swallowing liquids or solid food?  No   Do you experience worsening symptoms when overheated? Yes   Do you need any new equipment such as a wheelchair, walker or shower chair? No   Do you receive co-pay financial assistance for your principal MS medicine? Yes   Would you be interested in participating in an MS research trial in the future? Yes   For patients on Gilenya, Tecfidera, Aubagio, Rituxan, Ocrevus, Tysabri, Lemtrada or Methotrexate, are you aware that you should NOT receive live virus vaccines?  Not Applicable   Do you feel you have adequate family/friend support?  Yes   Do you have health insurance?   Yes   Are you currently employed? Yes   Do you receive SSDI/SSI?  Not Applicable   Do you use marijuana or cannabis products? No   How often? -   Have you been diagnosed with a urinary tract infection since your last visit here? No   Have you been diagnosed with a respiratory tract infection since your last visit here? No   Have you been to the emergency room since your last visit here? No   Have you been hospitalized since your last visit here?  No            Objective:        Timed 25 Foot Walk: 10/2/2019 2/18/2020   Did patient wear an AFO? No No   Was assistive device used? No No   Time for 25 Foot Walk (seconds) 3.6 3.5   Time for 25 Foot Walk (seconds) 3.7 -     Neurologic Exam  MENTAL STATUS: grossly intact  CRANIAL NERVE EXAM: There is no internuclear ophthalmoplegia. Extraocular   muscles are intact.  No facial   asymmetry.There is no dysarthria.   MOTOR EXAM: Normal bulk and tone throughout UE and LE  bilaterally. Rapid sequential movements are normal. Strength is 5/5 in all groups   in the lower extremities and upper extremities.   REFLEXES: Symmetric and 2+ throughout in all 4 extremities.   SENSORY EXAM: Normal to vibration t/o  COORDINATION: Normal finger-to-nose exam.   GAIT: Narrow based and stable.      Imaging:     No results found for this or any previous visit.  No results found for this or any previous visit.  No results found for this or any previous visit.  Results for orders placed during the hospital encounter of 02/17/20   MRI Brain Demyelinating W W/O Contrast    Impression No detrimental change with stable overall MS plaque burden.  Interval resolution of previously noted small focus of enhancement.  No new area of enhancement to suggest active demyelinization.      Electronically signed by: Taqueria Flores MD  Date:    02/18/2020  Time:    09:23     Results for orders placed during the hospital encounter of 01/18/19   MRI Cervical Spine Demyelinating W W/O Contrast    Impression Unremarkable motion limited MRI of the cervical spine as detailed above      Electronically signed by: Naren Santiago DO  Date:    01/18/2019  Time:    12:53     No results found for this or any previous visit.      Labs:     Lab Results   Component Value Date    IGHBQDZJ71QC 66 10/02/2019    ABLTVLCW80RD 46 02/06/2019    CKHJVWAJ78DM 26 (L) 02/14/2018     Lab Results   Component Value Date    JCVINDEX 0.80 (A) 02/14/2018    JCVANTIBODY Positive (A) 02/14/2018     Lab Results   Component Value Date    CW9BENCG 70.7 02/06/2019    ABSOLUTECD3 3070 (H) 02/06/2019    BU7DMBIC 14.5 02/06/2019    ABSOLUTECD8 630 02/06/2019    GT2MKEUT 54.2 02/06/2019    ABSOLUTECD4 2350 (H) 02/06/2019    LABCD48 3.73 (H) 02/06/2019     Lab Results   Component Value Date    WBC 12.97 (H) 01/18/2019    RBC 5.11 01/18/2019    HGB 10.4 (L) 01/18/2019    HCT 36.4 (L) 01/18/2019    MCV 71 (L) 01/18/2019    MCH 20.4 (L) 01/18/2019    MCHC 28.6 (L)  01/18/2019    RDW 18.7 (H) 01/18/2019     (H) 01/18/2019    MPV 10.0 01/18/2019    GRAN 8.1 (H) 01/18/2019    GRAN 62.5 01/18/2019    LYMPH 4.1 01/18/2019    LYMPH 31.3 01/18/2019    MONO 0.6 01/18/2019    MONO 4.9 01/18/2019    EOS 0.1 01/18/2019    BASO 0.06 01/18/2019    EOSINOPHIL 0.4 01/18/2019    BASOPHIL 0.5 01/18/2019     Sodium   Date Value Ref Range Status   01/16/2019 137 136 - 145 mmol/L Final     Potassium   Date Value Ref Range Status   01/16/2019 3.8 3.5 - 5.1 mmol/L Final     Chloride   Date Value Ref Range Status   01/16/2019 106 95 - 110 mmol/L Final     CO2   Date Value Ref Range Status   01/16/2019 23 23 - 29 mmol/L Final     Glucose   Date Value Ref Range Status   01/16/2019 85 70 - 110 mg/dL Final     BUN, Bld   Date Value Ref Range Status   01/16/2019 8 6 - 20 mg/dL Final     Creatinine   Date Value Ref Range Status   01/16/2019 0.8 0.5 - 1.4 mg/dL Final     Calcium   Date Value Ref Range Status   01/16/2019 9.2 8.7 - 10.5 mg/dL Final     Total Protein   Date Value Ref Range Status   01/16/2019 7.3 6.0 - 8.4 g/dL Final     Albumin   Date Value Ref Range Status   01/16/2019 3.6 3.5 - 5.2 g/dL Final     Total Bilirubin   Date Value Ref Range Status   01/16/2019 0.3 0.1 - 1.0 mg/dL Final     Comment:     For infants and newborns, interpretation of results should be based  on gestational age, weight and in agreement with clinical  observations.  Premature Infant recommended reference ranges:  Up to 24 hours.............<8.0 mg/dL  Up to 48 hours............<12.0 mg/dL  3-5 days..................<15.0 mg/dL  6-29 days.................<15.0 mg/dL       Alkaline Phosphatase   Date Value Ref Range Status   01/16/2019 61 55 - 135 U/L Final     AST   Date Value Ref Range Status   01/16/2019 21 10 - 40 U/L Final     ALT   Date Value Ref Range Status   01/16/2019 17 10 - 44 U/L Final     Anion Gap   Date Value Ref Range Status   01/16/2019 8 8 - 16 mmol/L Final     eGFR if    Date  Value Ref Range Status   01/16/2019 >60 >60 mL/min/1.73 m^2 Final     eGFR if non    Date Value Ref Range Status   01/16/2019 >60 >60 mL/min/1.73 m^2 Final     Comment:     Calculation used to obtain the estimated glomerular filtration  rate (eGFR) is the CKD-EPI equation.        No results found for: HEPBSAG, HEPBSAB, HEPBCAB        MS Impression and Plan:     NEURO MULTIPLE SCLEROSIS IMPRESSION:   MS Status:     Number of relapses in the past year?:  0    Clinical Progression:  Clinically Stable    MRI Progression:  Stable  Plan:     DMT:  No change in management    Symptom Management:  Implement change in symptom management    Implement Change in Symptom Management:  Bowel and Adaptive Needs (advised daily metamucil to address constipation;  also, will fill out form to get her exemption for tinted windows due to her heat sensitivity)     Next Imaging Due: 2/18/2021     Next Labs Due: 8/18/2020     Pt's new MRI is completely stable;   Will stay the course with Copaxone  Continue high dose D3 at 10,000 IU/day--check again next visit;   F/u 6 months Noris Teague PA-C  Pt hopes to try to conceive child in 2021      Our visit today lasted 30 minutes, and 100% of this time was spent face to face with the patient. Over 50% of this visit included discussion of the treatment plan/medication changes/symptom management/exam findings/imaging results/coordination of care. The patient agrees with the plan of care.    Problem List Items Addressed This Visit     None          Raquel Fall MD

## 2020-07-06 DIAGNOSIS — G35 MULTIPLE SCLEROSIS: ICD-10-CM

## 2020-07-06 RX ORDER — GLATIRAMER 40 MG/ML
40 INJECTION, SOLUTION SUBCUTANEOUS
Qty: 36 SYRINGE | Refills: 1 | Status: SHIPPED | OUTPATIENT
Start: 2020-07-06 | End: 2020-12-16 | Stop reason: SDUPTHER

## 2020-11-02 ENCOUNTER — PATIENT MESSAGE (OUTPATIENT)
Dept: PSYCHIATRY | Facility: CLINIC | Age: 30
End: 2020-11-02

## 2020-12-16 DIAGNOSIS — G35 MULTIPLE SCLEROSIS: ICD-10-CM

## 2020-12-18 RX ORDER — GLATIRAMER 40 MG/ML
40 INJECTION, SOLUTION SUBCUTANEOUS
Qty: 12 SYRINGE | Refills: 0 | Status: SHIPPED | OUTPATIENT
Start: 2020-12-18 | End: 2021-01-06 | Stop reason: SDUPTHER

## 2021-01-27 ENCOUNTER — OFFICE VISIT (OUTPATIENT)
Dept: NEUROLOGY | Facility: CLINIC | Age: 31
End: 2021-01-27
Payer: COMMERCIAL

## 2021-01-27 ENCOUNTER — LAB VISIT (OUTPATIENT)
Dept: LAB | Facility: HOSPITAL | Age: 31
End: 2021-01-27
Payer: COMMERCIAL

## 2021-01-27 VITALS
DIASTOLIC BLOOD PRESSURE: 102 MMHG | BODY MASS INDEX: 45.99 KG/M2 | WEIGHT: 293 LBS | TEMPERATURE: 97 F | SYSTOLIC BLOOD PRESSURE: 153 MMHG | HEIGHT: 67 IN | HEART RATE: 83 BPM

## 2021-01-27 DIAGNOSIS — G35 MULTIPLE SCLEROSIS: ICD-10-CM

## 2021-01-27 DIAGNOSIS — Z71.89 COUNSELING REGARDING GOALS OF CARE: ICD-10-CM

## 2021-01-27 DIAGNOSIS — E55.9 VITAMIN D INSUFFICIENCY: ICD-10-CM

## 2021-01-27 DIAGNOSIS — G35 MULTIPLE SCLEROSIS: Primary | ICD-10-CM

## 2021-01-27 DIAGNOSIS — Z29.89 PROPHYLACTIC IMMUNOTHERAPY: ICD-10-CM

## 2021-01-27 DIAGNOSIS — E66.01 MORBID OBESITY WITH BMI OF 60.0-69.9, ADULT: ICD-10-CM

## 2021-01-27 LAB — 25(OH)D3+25(OH)D2 SERPL-MCNC: 88 NG/ML (ref 30–96)

## 2021-01-27 PROCEDURE — 1126F PR PAIN SEVERITY QUANTIFIED, NO PAIN PRESENT: ICD-10-PCS | Mod: S$GLB,,, | Performed by: PHYSICIAN ASSISTANT

## 2021-01-27 PROCEDURE — 99214 PR OFFICE/OUTPT VISIT, EST, LEVL IV, 30-39 MIN: ICD-10-PCS | Mod: S$GLB,,, | Performed by: PHYSICIAN ASSISTANT

## 2021-01-27 PROCEDURE — 1126F AMNT PAIN NOTED NONE PRSNT: CPT | Mod: S$GLB,,, | Performed by: PHYSICIAN ASSISTANT

## 2021-01-27 PROCEDURE — 99214 OFFICE O/P EST MOD 30 MIN: CPT | Mod: S$GLB,,, | Performed by: PHYSICIAN ASSISTANT

## 2021-01-27 PROCEDURE — 82306 VITAMIN D 25 HYDROXY: CPT

## 2021-01-27 PROCEDURE — 36415 COLL VENOUS BLD VENIPUNCTURE: CPT

## 2021-01-27 PROCEDURE — 99999 PR PBB SHADOW E&M-EST. PATIENT-LVL III: ICD-10-PCS | Mod: PBBFAC,,, | Performed by: PHYSICIAN ASSISTANT

## 2021-01-27 PROCEDURE — 99999 PR PBB SHADOW E&M-EST. PATIENT-LVL III: CPT | Mod: PBBFAC,,, | Performed by: PHYSICIAN ASSISTANT

## 2021-01-27 PROCEDURE — 3008F PR BODY MASS INDEX (BMI) DOCUMENTED: ICD-10-PCS | Mod: CPTII,S$GLB,, | Performed by: PHYSICIAN ASSISTANT

## 2021-01-27 PROCEDURE — 3008F BODY MASS INDEX DOCD: CPT | Mod: CPTII,S$GLB,, | Performed by: PHYSICIAN ASSISTANT

## 2021-02-05 ENCOUNTER — PATIENT MESSAGE (OUTPATIENT)
Dept: NEUROLOGY | Facility: CLINIC | Age: 31
End: 2021-02-05

## 2021-05-28 ENCOUNTER — IMMUNIZATION (OUTPATIENT)
Dept: INTERNAL MEDICINE | Facility: CLINIC | Age: 31
End: 2021-05-28
Payer: COMMERCIAL

## 2021-05-28 DIAGNOSIS — Z23 NEED FOR VACCINATION: Primary | ICD-10-CM

## 2021-05-28 PROCEDURE — 91300 COVID-19, MRNA, LNP-S, PF, 30 MCG/0.3 ML DOSE VACCINE: CPT | Mod: PBBFAC | Performed by: FAMILY MEDICINE

## 2021-05-31 ENCOUNTER — PATIENT MESSAGE (OUTPATIENT)
Dept: PSYCHIATRY | Facility: CLINIC | Age: 31
End: 2021-05-31

## 2021-06-25 ENCOUNTER — IMMUNIZATION (OUTPATIENT)
Dept: INTERNAL MEDICINE | Facility: CLINIC | Age: 31
End: 2021-06-25
Payer: COMMERCIAL

## 2021-06-25 DIAGNOSIS — Z23 NEED FOR VACCINATION: Primary | ICD-10-CM

## 2021-06-25 PROCEDURE — 0002A COVID-19, MRNA, LNP-S, PF, 30 MCG/0.3 ML DOSE VACCINE: CPT | Mod: PBBFAC | Performed by: FAMILY MEDICINE

## 2021-06-25 PROCEDURE — 91300 COVID-19, MRNA, LNP-S, PF, 30 MCG/0.3 ML DOSE VACCINE: CPT | Mod: PBBFAC | Performed by: FAMILY MEDICINE

## 2021-07-07 DIAGNOSIS — G35 MULTIPLE SCLEROSIS: ICD-10-CM

## 2021-07-07 RX ORDER — GLATIRAMER 40 MG/ML
40 INJECTION, SOLUTION SUBCUTANEOUS
Qty: 36 SYRINGE | Refills: 1 | Status: SHIPPED | OUTPATIENT
Start: 2021-07-07 | End: 2021-12-23 | Stop reason: SDUPTHER

## 2021-07-24 ENCOUNTER — HOSPITAL ENCOUNTER (OUTPATIENT)
Dept: RADIOLOGY | Facility: HOSPITAL | Age: 31
Discharge: HOME | End: 2021-07-24
Attending: PHYSICIAN ASSISTANT
Payer: COMMERCIAL

## 2021-07-24 DIAGNOSIS — G35 MULTIPLE SCLEROSIS: ICD-10-CM

## 2021-07-24 PROCEDURE — 70551 MRI BRAIN STEM W/O DYE: CPT | Mod: TC

## 2021-07-24 PROCEDURE — 70551 MRI BRAIN DEMYELINATING WITHOUT CONTRAST: ICD-10-PCS | Mod: 26,,, | Performed by: RADIOLOGY

## 2021-07-24 PROCEDURE — 70551 MRI BRAIN STEM W/O DYE: CPT | Mod: 26,,, | Performed by: RADIOLOGY

## 2021-08-12 ENCOUNTER — OFFICE VISIT (OUTPATIENT)
Dept: NEUROLOGY | Facility: CLINIC | Age: 31
End: 2021-08-12
Payer: COMMERCIAL

## 2021-08-12 VITALS
DIASTOLIC BLOOD PRESSURE: 94 MMHG | BODY MASS INDEX: 45.99 KG/M2 | SYSTOLIC BLOOD PRESSURE: 148 MMHG | HEART RATE: 105 BPM | WEIGHT: 293 LBS | HEIGHT: 67 IN

## 2021-08-12 DIAGNOSIS — Z29.89 PROPHYLACTIC IMMUNOTHERAPY: ICD-10-CM

## 2021-08-12 DIAGNOSIS — E55.9 VITAMIN D INSUFFICIENCY: ICD-10-CM

## 2021-08-12 DIAGNOSIS — Z71.89 COUNSELING REGARDING GOALS OF CARE: ICD-10-CM

## 2021-08-12 DIAGNOSIS — G35 MULTIPLE SCLEROSIS: Primary | ICD-10-CM

## 2021-08-12 PROCEDURE — 3080F PR MOST RECENT DIASTOLIC BLOOD PRESSURE >= 90 MM HG: ICD-10-PCS | Mod: CPTII,S$GLB,, | Performed by: PHYSICIAN ASSISTANT

## 2021-08-12 PROCEDURE — 3008F BODY MASS INDEX DOCD: CPT | Mod: CPTII,S$GLB,, | Performed by: PHYSICIAN ASSISTANT

## 2021-08-12 PROCEDURE — 99215 OFFICE O/P EST HI 40 MIN: CPT | Mod: S$GLB,,, | Performed by: PHYSICIAN ASSISTANT

## 2021-08-12 PROCEDURE — 3077F PR MOST RECENT SYSTOLIC BLOOD PRESSURE >= 140 MM HG: ICD-10-PCS | Mod: CPTII,S$GLB,, | Performed by: PHYSICIAN ASSISTANT

## 2021-08-12 PROCEDURE — 1126F AMNT PAIN NOTED NONE PRSNT: CPT | Mod: CPTII,S$GLB,, | Performed by: PHYSICIAN ASSISTANT

## 2021-08-12 PROCEDURE — 1126F PR PAIN SEVERITY QUANTIFIED, NO PAIN PRESENT: ICD-10-PCS | Mod: CPTII,S$GLB,, | Performed by: PHYSICIAN ASSISTANT

## 2021-08-12 PROCEDURE — 99215 PR OFFICE/OUTPT VISIT, EST, LEVL V, 40-54 MIN: ICD-10-PCS | Mod: S$GLB,,, | Performed by: PHYSICIAN ASSISTANT

## 2021-08-12 PROCEDURE — 3008F PR BODY MASS INDEX (BMI) DOCUMENTED: ICD-10-PCS | Mod: CPTII,S$GLB,, | Performed by: PHYSICIAN ASSISTANT

## 2021-08-12 PROCEDURE — 1159F MED LIST DOCD IN RCRD: CPT | Mod: CPTII,S$GLB,, | Performed by: PHYSICIAN ASSISTANT

## 2021-08-12 PROCEDURE — 1159F PR MEDICATION LIST DOCUMENTED IN MEDICAL RECORD: ICD-10-PCS | Mod: CPTII,S$GLB,, | Performed by: PHYSICIAN ASSISTANT

## 2021-08-12 PROCEDURE — 3077F SYST BP >= 140 MM HG: CPT | Mod: CPTII,S$GLB,, | Performed by: PHYSICIAN ASSISTANT

## 2021-08-12 PROCEDURE — 99999 PR PBB SHADOW E&M-EST. PATIENT-LVL III: ICD-10-PCS | Mod: PBBFAC,,, | Performed by: PHYSICIAN ASSISTANT

## 2021-08-12 PROCEDURE — 3080F DIAST BP >= 90 MM HG: CPT | Mod: CPTII,S$GLB,, | Performed by: PHYSICIAN ASSISTANT

## 2021-08-12 PROCEDURE — 99999 PR PBB SHADOW E&M-EST. PATIENT-LVL III: CPT | Mod: PBBFAC,,, | Performed by: PHYSICIAN ASSISTANT

## 2021-09-01 ENCOUNTER — PATIENT MESSAGE (OUTPATIENT)
Dept: PSYCHIATRY | Facility: CLINIC | Age: 31
End: 2021-09-01

## 2021-09-02 ENCOUNTER — PATIENT MESSAGE (OUTPATIENT)
Dept: PSYCHIATRY | Facility: CLINIC | Age: 31
End: 2021-09-02

## 2021-10-27 ENCOUNTER — PATIENT MESSAGE (OUTPATIENT)
Dept: NEUROLOGY | Facility: CLINIC | Age: 31
End: 2021-10-27
Payer: COMMERCIAL

## 2021-12-21 ENCOUNTER — PATIENT MESSAGE (OUTPATIENT)
Dept: NEUROLOGY | Facility: CLINIC | Age: 31
End: 2021-12-21
Payer: COMMERCIAL

## 2022-02-09 ENCOUNTER — IMMUNIZATION (OUTPATIENT)
Dept: PHARMACY | Facility: CLINIC | Age: 32
End: 2022-02-09
Payer: COMMERCIAL

## 2022-02-09 DIAGNOSIS — Z23 NEED FOR VACCINATION: Primary | ICD-10-CM

## 2022-02-28 ENCOUNTER — PATIENT MESSAGE (OUTPATIENT)
Dept: PSYCHIATRY | Facility: CLINIC | Age: 32
End: 2022-02-28
Payer: COMMERCIAL

## 2022-03-02 ENCOUNTER — PATIENT MESSAGE (OUTPATIENT)
Dept: NEUROLOGY | Facility: CLINIC | Age: 32
End: 2022-03-02
Payer: COMMERCIAL

## 2022-03-02 DIAGNOSIS — G35 MULTIPLE SCLEROSIS: ICD-10-CM

## 2022-03-07 RX ORDER — GLATIRAMER 40 MG/ML
40 INJECTION, SOLUTION SUBCUTANEOUS
Qty: 36 ML | Refills: 0 | Status: SHIPPED | OUTPATIENT
Start: 2022-03-07 | End: 2022-07-01 | Stop reason: SDUPTHER

## 2022-03-09 ENCOUNTER — DOCUMENTATION ONLY (OUTPATIENT)
Dept: NEUROLOGY | Facility: CLINIC | Age: 32
End: 2022-03-09

## 2022-03-31 NOTE — PROGRESS NOTES
Spoke with Марина at [employer] [facility] who confirmed the appeal for glatiramer acetate has been overturned and pt is now approved through 3/3/23, case ID #[id number]. Member ID is [id number].

## 2022-04-18 ENCOUNTER — PATIENT MESSAGE (OUTPATIENT)
Dept: NEUROLOGY | Facility: CLINIC | Age: 32
End: 2022-04-18
Payer: COMMERCIAL

## 2022-04-20 NOTE — TELEPHONE ENCOUNTER
ANGELA JAQUEZ Guzman: [id number] - Rx #: [id number]  Status  Sent to [facility]  Drug  Copaxone 40MG/ML syringes  Form  [facility] Electronic PA Form (2017 [employer])

## 2022-05-12 ENCOUNTER — HOSPITAL ENCOUNTER (OUTPATIENT)
Dept: RADIOLOGY | Facility: HOSPITAL | Age: 32
Discharge: HOME | End: 2022-05-12
Attending: PHYSICIAN ASSISTANT
Payer: COMMERCIAL

## 2022-05-12 DIAGNOSIS — G35 MULTIPLE SCLEROSIS: ICD-10-CM

## 2022-05-12 PROCEDURE — 25500020 PHARM REV CODE 255: Performed by: PHYSICIAN ASSISTANT

## 2022-05-12 PROCEDURE — 70553 MRI BRAIN STEM W/O & W/DYE: CPT | Mod: 26,,, | Performed by: RADIOLOGY

## 2022-05-12 PROCEDURE — 70553 MRI BRAIN STEM W/O & W/DYE: CPT | Mod: TC

## 2022-05-12 PROCEDURE — 70553 MRI BRAIN DEMYELINATING W/ WO CONTRAST: ICD-10-PCS | Mod: 26,,, | Performed by: RADIOLOGY

## 2022-05-12 PROCEDURE — A9585 GADOBUTROL INJECTION: HCPCS | Performed by: PHYSICIAN ASSISTANT

## 2022-05-12 RX ORDER — GADOBUTROL 604.72 MG/ML
10 INJECTION INTRAVENOUS
Status: COMPLETED | OUTPATIENT
Start: 2022-05-12 | End: 2022-05-12

## 2022-05-12 RX ADMIN — GADOBUTROL 10 ML: 604.72 INJECTION INTRAVENOUS at 01:05

## 2022-05-25 ENCOUNTER — OFFICE VISIT (OUTPATIENT)
Dept: NEUROLOGY | Facility: CLINIC | Age: 32
End: 2022-05-25
Payer: COMMERCIAL

## 2022-05-25 VITALS
HEIGHT: 67 IN | WEIGHT: 293 LBS | BODY MASS INDEX: 45.99 KG/M2 | DIASTOLIC BLOOD PRESSURE: 85 MMHG | HEART RATE: 78 BPM | SYSTOLIC BLOOD PRESSURE: 147 MMHG

## 2022-05-25 DIAGNOSIS — G35 MULTIPLE SCLEROSIS: Primary | ICD-10-CM

## 2022-05-25 DIAGNOSIS — Z71.89 COUNSELING REGARDING GOALS OF CARE: ICD-10-CM

## 2022-05-25 DIAGNOSIS — R90.89 ABNORMAL FINDING ON MRI OF BRAIN: ICD-10-CM

## 2022-05-25 DIAGNOSIS — Z29.89 PROPHYLACTIC IMMUNOTHERAPY: ICD-10-CM

## 2022-05-25 PROCEDURE — 99999 PR PBB SHADOW E&M-EST. PATIENT-LVL IV: CPT | Mod: PBBFAC,,, | Performed by: PSYCHIATRY & NEUROLOGY

## 2022-05-25 PROCEDURE — 3077F SYST BP >= 140 MM HG: CPT | Mod: CPTII,S$GLB,, | Performed by: PSYCHIATRY & NEUROLOGY

## 2022-05-25 PROCEDURE — 1160F RVW MEDS BY RX/DR IN RCRD: CPT | Mod: CPTII,S$GLB,, | Performed by: PSYCHIATRY & NEUROLOGY

## 2022-05-25 PROCEDURE — 3008F BODY MASS INDEX DOCD: CPT | Mod: CPTII,S$GLB,, | Performed by: PSYCHIATRY & NEUROLOGY

## 2022-05-25 PROCEDURE — 3077F PR MOST RECENT SYSTOLIC BLOOD PRESSURE >= 140 MM HG: ICD-10-PCS | Mod: CPTII,S$GLB,, | Performed by: PSYCHIATRY & NEUROLOGY

## 2022-05-25 PROCEDURE — 99215 PR OFFICE/OUTPT VISIT, EST, LEVL V, 40-54 MIN: ICD-10-PCS | Mod: S$GLB,,, | Performed by: PSYCHIATRY & NEUROLOGY

## 2022-05-25 PROCEDURE — 1159F PR MEDICATION LIST DOCUMENTED IN MEDICAL RECORD: ICD-10-PCS | Mod: CPTII,S$GLB,, | Performed by: PSYCHIATRY & NEUROLOGY

## 2022-05-25 PROCEDURE — 3079F DIAST BP 80-89 MM HG: CPT | Mod: CPTII,S$GLB,, | Performed by: PSYCHIATRY & NEUROLOGY

## 2022-05-25 PROCEDURE — 3079F PR MOST RECENT DIASTOLIC BLOOD PRESSURE 80-89 MM HG: ICD-10-PCS | Mod: CPTII,S$GLB,, | Performed by: PSYCHIATRY & NEUROLOGY

## 2022-05-25 PROCEDURE — 99999 PR PBB SHADOW E&M-EST. PATIENT-LVL IV: ICD-10-PCS | Mod: PBBFAC,,, | Performed by: PSYCHIATRY & NEUROLOGY

## 2022-05-25 PROCEDURE — 1160F PR REVIEW ALL MEDS BY PRESCRIBER/CLIN PHARMACIST DOCUMENTED: ICD-10-PCS | Mod: CPTII,S$GLB,, | Performed by: PSYCHIATRY & NEUROLOGY

## 2022-05-25 PROCEDURE — 99215 OFFICE O/P EST HI 40 MIN: CPT | Mod: S$GLB,,, | Performed by: PSYCHIATRY & NEUROLOGY

## 2022-05-25 PROCEDURE — 1159F MED LIST DOCD IN RCRD: CPT | Mod: CPTII,S$GLB,, | Performed by: PSYCHIATRY & NEUROLOGY

## 2022-05-25 PROCEDURE — 3008F PR BODY MASS INDEX (BMI) DOCUMENTED: ICD-10-PCS | Mod: CPTII,S$GLB,, | Performed by: PSYCHIATRY & NEUROLOGY

## 2022-05-25 NOTE — Clinical Note
Pt signed Ocrevus paperwork and I'm putting it on your desk; do NOT submit please until pt contacts us with final decision ; if she proceed, will order labs / referral to ID later. She lives in

## 2022-05-25 NOTE — PROGRESS NOTES
Subjective:          Patient ID: Kendy Ryder is a 31 y.o. female who presents today for a routine clinic visit for MS.      MS HPI:  · DMT: glatiramer acetate  · Side effects from DMT? Yes -  · Taking vitamin D3 as recommended? Yes - 10,000 IU/day   · Adherent with GA, and has been taking it 3x/ week;   · No sense of relapse  · Still wants to conceive  · Graduated in 2020 in [employer] ; she has PhD;    · vaxxed x 3 against COVID     Medications:  Current Outpatient Medications   Medication Sig    alpha lipoic acid 100 mg Cap Take by mouth.    biotin 10,000 mcg Cap Take by mouth.    cholecalciferol, vitamin D3, 125 mcg (5,000 unit) Tab Take 10,000 Units by mouth once daily.    glatiramer (COPAXONE, GLATOPA) 40 mg/mL injection Inject 40 mg into the skin 3 (three) times a week.       SOCIAL HISTORY  Social History     Tobacco Use    Smoking status: Never Smoker    Smokeless tobacco: Never Used   Substance Use Topics    Alcohol use: No    Drug use: No       Living arrangements - the patient lives with their spouse.  Works full time;      REVIEW OF SYMPTOMS 5/25/2022   Do you feel abnormally tired on most days? No   Do you feel you generally sleep well? Yes   Do you have difficulty controlling your bladder?  No   Do you have difficulty controlling your bowels?  No   Do you have frequent muscle cramps, tightness or spasms in your limbs?  No   Do you have new visual symptoms?  No   Do you have worsening difficulty with your memory or thinking? No   Do you have worsening symptoms of anxiety or depression?  No   For patients who walk, Do you have more difficulty walking?  No   Have you fallen since your last visit?  No   For patients who use wheelchairs: Do you have any skin wounds or breakdown? No   Do you have difficulty using your hands?  No   Do you have shooting or burning pain? No   Do you have difficulty with sexual function?  No   If you are sexually active, are you using birth control? Y/N  N/A No    Do you often choke when swallowing liquids or solid food?  No   Do you experience worsening symptoms when overheated? Yes   Do you need any new equipment such as a wheelchair, walker or shower chair? No   Do you receive co-pay financial assistance for your principal MS medicine? Yes   Would you be interested in participating in an MS research trial in the future? Yes   For patients on Gilenya, Tecfidera, Aubagio, Rituxan, Ocrevus, Tysabri, Lemtrada or Methotrexate, are you aware that you should NOT receive live virus vaccines?  Not Applicable   Do you feel you have adequate family/friend support?  Yes   Do you have health insurance?   Yes   Are you currently employed? Yes   Do you receive SSDI/SSI?  Not Applicable   Do you use marijuana or cannabis products? No   How often? -   Have you been diagnosed with a urinary tract infection since your last visit here? No   Have you been diagnosed with a respiratory tract infection since your last visit here? No   Have you been to the emergency room since your last visit here? No   Have you been hospitalized since your last visit here?  No            Objective:        Timed 25 Foot Walk: 8/12/2021 5/25/2022   Did patient wear an AFO? No No   Was assistive device used? No No   Time for 25 Foot Walk (seconds) 3.8 3.9   Time for 25 Foot Walk (seconds) 3.8 -     Neurologic Exam  MENTAL STATUS: grossly intact  CRANIAL NERVE EXAM: There is no internuclear ophthalmoplegia. Extraocular   muscles are intact.  No facial   asymmetry.There is no dysarthria.   MOTOR EXAM: Normal bulk and tone throughout UE and LE bilaterally. Rapid sequential movements are normal. Strength is 5/5 in all groups   in the lower extremities and upper extremities.   REFLEXES: Symmetric and 2+ throughout in all 4 extremities.   SENSORY EXAM: Normal to vibration t/o  COORDINATION: Normal finger-to-nose exam.   GAIT: Narrow based and stable.      Imaging:     Results for orders placed during the hospital  encounter of 07/24/21    MRI Brain Demyelinating Without Contrast    Impression  Findings in the cerebral white matter which are typical for multiple sclerosis.  New left occipital white matter plaque without restricted diffusion to suggest active demyelinization..      Electronically signed by: Taqueria Flores MD  Date:    07/25/2021  Time:    16:23    No results found for this or any previous visit.    No results found for this or any previous visit.    Results for orders placed during the hospital encounter of 05/12/22    MRI Brain Demyelinating W W/O Contrast    Impression  Findings in the cerebral white matter which are typical for multiple sclerosis.    Acute plaque noted in the pericallosal white matter bilaterally likely representing active disease.    Overall however disease burden is similar to prior.      Electronically signed by: Theo Naranjo  Date:    05/12/2022  Time:    20:16    Results for orders placed during the hospital encounter of 01/18/19    MRI Cervical Spine Demyelinating W W/O Contrast    Impression  Unremarkable motion limited MRI of the cervical spine as detailed above      Electronically signed by: Naren Santiago DO  Date:    01/18/2019  Time:    12:53        Labs:     Lab Results   Component Value Date    INEZJEXK78OQ 88 01/27/2021    CKSDFJCP69BU 66 10/02/2019    TYQYDJZF52US 46 02/06/2019     Lab Results   Component Value Date    JCVINDEX 0.80 (A) 02/14/2018    JCVANTIBODY Positive (A) 02/14/2018     Lab Results   Component Value Date    BK2QCEEJ 70.7 02/06/2019    ABSOLUTECD3 3070 (H) 02/06/2019    IL3XLQKA 14.5 02/06/2019    ABSOLUTECD8 630 02/06/2019    OJ5QUXRW 54.2 02/06/2019    ABSOLUTECD4 2350 (H) 02/06/2019    LABCD48 3.73 (H) 02/06/2019     Lab Results   Component Value Date    WBC 12.97 (H) 01/18/2019    RBC 5.11 01/18/2019    HGB 10.4 (L) 01/18/2019    HCT 36.4 (L) 01/18/2019    MCV 71 (L) 01/18/2019    MCH 20.4 (L) 01/18/2019    MCHC 28.6 (L) 01/18/2019    RDW 18.7 (H)  01/18/2019     (H) 01/18/2019    MPV 10.0 01/18/2019    GRAN 8.1 (H) 01/18/2019    GRAN 62.5 01/18/2019    LYMPH 4.1 01/18/2019    LYMPH 31.3 01/18/2019    MONO 0.6 01/18/2019    MONO 4.9 01/18/2019    EOS 0.1 01/18/2019    BASO 0.06 01/18/2019    EOSINOPHIL 0.4 01/18/2019    BASOPHIL 0.5 01/18/2019     Sodium   Date Value Ref Range Status   01/16/2019 137 136 - 145 mmol/L Final     Potassium   Date Value Ref Range Status   01/16/2019 3.8 3.5 - 5.1 mmol/L Final     Chloride   Date Value Ref Range Status   01/16/2019 106 95 - 110 mmol/L Final     CO2   Date Value Ref Range Status   01/16/2019 23 23 - 29 mmol/L Final     Glucose   Date Value Ref Range Status   01/16/2019 85 70 - 110 mg/dL Final     BUN   Date Value Ref Range Status   01/16/2019 8 6 - 20 mg/dL Final     Creatinine   Date Value Ref Range Status   01/16/2019 0.8 0.5 - 1.4 mg/dL Final     Calcium   Date Value Ref Range Status   01/16/2019 9.2 8.7 - 10.5 mg/dL Final     Total Protein   Date Value Ref Range Status   01/16/2019 7.3 6.0 - 8.4 g/dL Final     Albumin   Date Value Ref Range Status   01/16/2019 3.6 3.5 - 5.2 g/dL Final     Total Bilirubin   Date Value Ref Range Status   01/16/2019 0.3 0.1 - 1.0 mg/dL Final     Comment:     For infants and newborns, interpretation of results should be based  on gestational age, weight and in agreement with clinical  observations.  Premature Infant recommended reference ranges:  Up to 24 hours.............<8.0 mg/dL  Up to 48 hours............<12.0 mg/dL  3-5 days..................<15.0 mg/dL  6-29 days.................<15.0 mg/dL       Alkaline Phosphatase   Date Value Ref Range Status   01/16/2019 61 55 - 135 U/L Final     AST   Date Value Ref Range Status   01/16/2019 21 10 - 40 U/L Final     ALT   Date Value Ref Range Status   01/16/2019 17 10 - 44 U/L Final     Anion Gap   Date Value Ref Range Status   01/16/2019 8 8 - 16 mmol/L Final     eGFR if    Date Value Ref Range Status    01/16/2019 >60 >60 mL/min/1.73 m^2 Final     eGFR if non    Date Value Ref Range Status   01/16/2019 >60 >60 mL/min/1.73 m^2 Final     Comment:     Calculation used to obtain the estimated glomerular filtration  rate (eGFR) is the CKD-EPI equation.        No results found for: HEPBSAG, HEPBSAB, HEPBCAB        MS Impression and Plan:     NEURO MULTIPLE SCLEROSIS IMPRESSION:   MS Status:     Number of relapses in the past year?:  0    Clinical Progression:  Clinically Stable    MRI Progression:  Worsened (new lesions)  Plan:     DMT:  Switch Disease Modifying therapy    Switch Disease Modifying Therapy FROM:  Glatiramer acetate    TO:  Other    Symptom Management:  No change in symptom management     MRI with new lesions, again, despite being adherent with DMT this time.    She does wish to conceive in the next year.   After shared decision making, we've agree to change DMT. Ocrevus or interferon were considered. The rationale, side effects, risks and expectations of each of these medications were discussed.      She will let us know her final decision over portal this week, and we will order labs / referrals as appropriate at that point.     F/u Steffi Louis [facility] in 3-4 months VV               Problem List Items Addressed This Visit    None         Raquel Fall MD     I spent a total of 40 minutes on the day of the visit.This includes face to face time and non-face to face time preparing to see the patient (eg, review of tests), obtaining and/or reviewing separately obtained history, documenting clinical information in the electronic or other health record, independently interpreting results and communicating results to the patient/family/caregiver, or care coordinator.

## 2022-06-11 ENCOUNTER — PATIENT MESSAGE (OUTPATIENT)
Dept: NEUROLOGY | Facility: CLINIC | Age: 32
End: 2022-06-11
Payer: COMMERCIAL

## 2022-06-11 DIAGNOSIS — D84.9 IMMUNOSUPPRESSION: ICD-10-CM

## 2022-06-11 DIAGNOSIS — G35 MS (MULTIPLE SCLEROSIS): Primary | ICD-10-CM

## 2022-06-13 NOTE — TELEPHONE ENCOUNTER
----- Message from Raquel Fall MD sent at 5/25/2022 12:32 PM CDT -----  Pt signed Ocrevus paperwork and I'm putting it on your desk; do NOT submit please until pt contacts us with final decision ; if she proceed, will order labs / referral to ID later. She lives in

## 2022-06-16 NOTE — TELEPHONE ENCOUNTER
Pt message to report she wants to proceed with Ocrevus.  Consent signed at recent office visit. Will proceed with labs , ID referral, and submission of new prescription

## 2022-06-17 ENCOUNTER — LAB VISIT (OUTPATIENT)
Dept: LAB | Facility: HOSPITAL | Age: 32
End: 2022-06-17
Attending: PSYCHIATRY & NEUROLOGY
Payer: COMMERCIAL

## 2022-06-17 DIAGNOSIS — D84.9 IMMUNOSUPPRESSION: ICD-10-CM

## 2022-06-17 DIAGNOSIS — G35 MS (MULTIPLE SCLEROSIS): ICD-10-CM

## 2022-06-17 LAB
BASOPHILS # BLD AUTO: 0.06 K/UL (ref 0–0.2)
BASOPHILS NFR BLD: 0.4 % (ref 0–1.9)
DIFFERENTIAL METHOD: ABNORMAL
EOSINOPHIL # BLD AUTO: 0.1 K/UL (ref 0–0.5)
EOSINOPHIL NFR BLD: 0.7 % (ref 0–8)
ERYTHROCYTE [DISTWIDTH] IN BLOOD BY AUTOMATED COUNT: 20.7 % (ref 11.5–14.5)
HCT VFR BLD AUTO: 33.4 % (ref 37–48.5)
HGB BLD-MCNC: 9.4 G/DL (ref 12–16)
IGA SERPL-MCNC: 257 MG/DL (ref 40–350)
IGG SERPL-MCNC: 1123 MG/DL (ref 650–1600)
IGM SERPL-MCNC: 161 MG/DL (ref 50–300)
IMM GRANULOCYTES # BLD AUTO: 0.04 K/UL (ref 0–0.04)
IMM GRANULOCYTES NFR BLD AUTO: 0.3 % (ref 0–0.5)
LYMPHOCYTES # BLD AUTO: 4.5 K/UL (ref 1–4.8)
LYMPHOCYTES NFR BLD: 33.4 % (ref 18–48)
MCH RBC QN AUTO: 20.2 PG (ref 27–31)
MCHC RBC AUTO-ENTMCNC: 28.1 G/DL (ref 32–36)
MCV RBC AUTO: 72 FL (ref 82–98)
MONOCYTES # BLD AUTO: 0.8 K/UL (ref 0.3–1)
MONOCYTES NFR BLD: 5.7 % (ref 4–15)
NEUTROPHILS # BLD AUTO: 8.1 K/UL (ref 1.8–7.7)
NEUTROPHILS NFR BLD: 59.5 % (ref 38–73)
NRBC BLD-RTO: 0 /100 WBC
PLATELET # BLD AUTO: 456 K/UL (ref 150–450)
PMV BLD AUTO: 11.7 FL (ref 9.2–12.9)
RBC # BLD AUTO: 4.65 M/UL (ref 4–5.4)
WBC # BLD AUTO: 13.58 K/UL (ref 3.9–12.7)

## 2022-06-17 PROCEDURE — 85025 COMPLETE CBC W/AUTO DIFF WBC: CPT | Performed by: PSYCHIATRY & NEUROLOGY

## 2022-06-17 PROCEDURE — 82784 ASSAY IGA/IGD/IGG/IGM EACH: CPT | Performed by: PSYCHIATRY & NEUROLOGY

## 2022-06-17 PROCEDURE — 87389 HIV-1 AG W/HIV-1&-2 AB AG IA: CPT | Performed by: PSYCHIATRY & NEUROLOGY

## 2022-06-17 PROCEDURE — 86787 VARICELLA-ZOSTER ANTIBODY: CPT | Performed by: PSYCHIATRY & NEUROLOGY

## 2022-06-17 PROCEDURE — 86706 HEP B SURFACE ANTIBODY: CPT | Performed by: PSYCHIATRY & NEUROLOGY

## 2022-06-17 PROCEDURE — 86682 HELMINTH ANTIBODY: CPT | Performed by: PSYCHIATRY & NEUROLOGY

## 2022-06-17 PROCEDURE — 86704 HEP B CORE ANTIBODY TOTAL: CPT | Performed by: PSYCHIATRY & NEUROLOGY

## 2022-06-17 PROCEDURE — 86803 HEPATITIS C AB TEST: CPT | Performed by: PSYCHIATRY & NEUROLOGY

## 2022-06-17 PROCEDURE — 36415 COLL VENOUS BLD VENIPUNCTURE: CPT | Performed by: PSYCHIATRY & NEUROLOGY

## 2022-06-17 PROCEDURE — 86480 TB TEST CELL IMMUN MEASURE: CPT | Performed by: PSYCHIATRY & NEUROLOGY

## 2022-06-17 PROCEDURE — 86790 VIRUS ANTIBODY NOS: CPT | Performed by: PSYCHIATRY & NEUROLOGY

## 2022-06-17 PROCEDURE — 87340 HEPATITIS B SURFACE AG IA: CPT | Performed by: PSYCHIATRY & NEUROLOGY

## 2022-06-17 PROCEDURE — 86592 SYPHILIS TEST NON-TREP QUAL: CPT | Performed by: PSYCHIATRY & NEUROLOGY

## 2022-06-18 LAB
VARICELLA INTERPRETATION: POSITIVE
VARICELLA ZOSTER IGG: 3.57 ISR (ref 0–0.9)

## 2022-06-20 LAB
HAV IGG SER QL IA: NEGATIVE
HBV CORE AB SERPL QL IA: NEGATIVE
HBV SURFACE AB SER-ACNC: POSITIVE M[IU]/ML
HBV SURFACE AG SERPL QL IA: NEGATIVE
HCV AB SERPL QL IA: NEGATIVE
HIV 1+2 AB+HIV1 P24 AG SERPL QL IA: NEGATIVE
RPR SER QL: NORMAL
STRONGYLOIDES ANTIBODY IGG: NEGATIVE

## 2022-06-22 LAB
GAMMA INTERFERON BACKGROUND BLD IA-ACNC: 0.15 IU/ML
M TB IFN-G CD4+ BCKGRND COR BLD-ACNC: -0.08 IU/ML
MITOGEN IGNF BCKGRD COR BLD-ACNC: 9.85 IU/ML
TB GOLD PLUS: NEGATIVE
TB2 - NIL: -0.06 IU/ML

## 2022-06-24 ENCOUNTER — PATIENT MESSAGE (OUTPATIENT)
Dept: PSYCHIATRY | Facility: CLINIC | Age: 32
End: 2022-06-24
Payer: COMMERCIAL

## 2022-06-29 NOTE — PROGRESS NOTES
Report given to [facility]. Resulting mimics negative except elevated Scleroderma SCL noted-will refer to Rheumatology

## 2022-07-01 DIAGNOSIS — G35 MULTIPLE SCLEROSIS: ICD-10-CM

## 2022-07-01 RX ORDER — GLATIRAMER 40 MG/ML
40 INJECTION, SOLUTION SUBCUTANEOUS
Qty: 12 ML | Refills: 0 | Status: SHIPPED | OUTPATIENT
Start: 2022-07-01 | End: 2023-01-31

## 2022-07-04 ENCOUNTER — PATIENT MESSAGE (OUTPATIENT)
Dept: ADMINISTRATIVE | Facility: OTHER | Age: 32
End: 2022-07-04
Payer: COMMERCIAL

## 2022-07-05 ENCOUNTER — OFFICE VISIT (OUTPATIENT)
Dept: INTERNAL MEDICINE | Facility: CLINIC | Age: 32
End: 2022-07-05
Payer: COMMERCIAL

## 2022-07-05 DIAGNOSIS — U07.1 COVID-19: ICD-10-CM

## 2022-07-05 DIAGNOSIS — R05.9 COUGH: Primary | ICD-10-CM

## 2022-07-05 PROCEDURE — 1160F PR REVIEW ALL MEDS BY PRESCRIBER/CLIN PHARMACIST DOCUMENTED: ICD-10-PCS | Mod: CPTII,95,, | Performed by: NURSE PRACTITIONER

## 2022-07-05 PROCEDURE — 99213 OFFICE O/P EST LOW 20 MIN: CPT | Mod: 95,,, | Performed by: NURSE PRACTITIONER

## 2022-07-05 PROCEDURE — 1160F RVW MEDS BY RX/DR IN RCRD: CPT | Mod: CPTII,95,, | Performed by: NURSE PRACTITIONER

## 2022-07-05 PROCEDURE — 99213 PR OFFICE/OUTPT VISIT, EST, LEVL III, 20-29 MIN: ICD-10-PCS | Mod: 95,,, | Performed by: NURSE PRACTITIONER

## 2022-07-05 PROCEDURE — 1159F MED LIST DOCD IN RCRD: CPT | Mod: CPTII,95,, | Performed by: NURSE PRACTITIONER

## 2022-07-05 PROCEDURE — 1159F PR MEDICATION LIST DOCUMENTED IN MEDICAL RECORD: ICD-10-PCS | Mod: CPTII,95,, | Performed by: NURSE PRACTITIONER

## 2022-07-05 RX ORDER — PROMETHAZINE HYDROCHLORIDE AND DEXTROMETHORPHAN HYDROBROMIDE 6.25; 15 MG/5ML; MG/5ML
5 SYRUP ORAL
Qty: 118 ML | Refills: 0 | Status: SHIPPED | OUTPATIENT
Start: 2022-07-05 | End: 2022-07-15

## 2022-07-05 NOTE — PROGRESS NOTES
Subjective:       Patient ID: Kendy Ryder is a 31 y.o. female.    Chief Complaint: COVID-19 Concerns    The patient location is: [place]  The chief complaint leading to consultation is: positive home Covid test.     Visit type: audiovisual    Face to Face time with patient: 5 minutes of total time spent on the encounter, which includes face to face time and non-face to face time preparing to see the patient (eg, review of tests), Obtaining and/or reviewing separately obtained history, Documenting clinical information in the electronic or other health record, Independently interpreting results (not separately reported) and communicating results to the patient/family/caregiver, or Care coordination (not separately reported).         Each patient to whom he or she provides medical services by telemedicine is:  (1) informed of the relationship between the physician and patient and the respective role of any other health care provider with respect to management of the patient; and (2) notified that he or she may decline to receive medical services by telemedicine and may withdraw from such care at any time.    Notes:     Patient presents after having positive home Covid test.     Symptoms started about 5 days ago.  Reports improvement in symptoms over time.  Took a couple of test and was positive on Sunday.     Continues to have cough and rhinorrhea.      Has been quarantined.     2-Covid Risk Score.      Cough  This is a new problem. The current episode started in the past 7 days. The problem has been unchanged. The problem occurs constantly. The cough is productive of sputum. Associated symptoms include nasal congestion, rhinorrhea and a sore throat. Pertinent negatives include no chest pain, chills, ear congestion, ear pain, fever, headaches, heartburn, hemoptysis, myalgias, postnasal drip, rash, shortness of breath, sweats, weight loss or wheezing. Nothing aggravates the symptoms. She has tried OTC cough  suppressant and rest for the symptoms. The treatment provided mild relief. Her past medical history is significant for bronchitis. There is no history of asthma, bronchiectasis, COPD, emphysema, environmental allergies or pneumonia.     Review of Systems   Constitutional: Negative for chills, fever and weight loss.   HENT: Positive for rhinorrhea and sore throat. Negative for ear pain and postnasal drip.    Respiratory: Positive for cough. Negative for hemoptysis, shortness of breath and wheezing.    Cardiovascular: Negative for chest pain.   Gastrointestinal: Negative for heartburn.   Musculoskeletal: Negative for myalgias.   Integumentary:  Negative for rash.   Allergic/Immunologic: Negative for environmental allergies.   Neurological: Negative for headaches.   Psychiatric/Behavioral: Negative for agitation.         Objective:      Physical Exam  Pulmonary:      Effort: Pulmonary effort is normal. No respiratory distress.   Neurological:      General: No focal deficit present.      Mental Status: She is alert.   Psychiatric:         Mood and Affect: Mood normal.         Behavior: Behavior normal. Behavior is cooperative.         Assessment:       Problem List Items Addressed This Visit    None     Visit Diagnoses     Cough    -  Primary    Relevant Medications    promethazine-dextromethorphan (PROMETHAZINE-DM) 6.25-15 mg/5 mL Syrp    [id number] home test              Plan:         Cough  -     promethazine-dextromethorphan (PROMETHAZINE-DM) 6.25-15 mg/5 mL Syrp; Take 5 mLs by mouth every 4 to 6 hours as needed (cough). Do not drive/operate heavy machinery while taking this medication.  Dispense: 118 mL; Refill: 0    COVID-19-positive home test      Take medications as prescribed.     Hydrate and rest.     Wear mask for another 5 days.      Monitor symptoms.

## 2022-07-11 ENCOUNTER — PATIENT MESSAGE (OUTPATIENT)
Dept: INTERNAL MEDICINE | Facility: CLINIC | Age: 32
End: 2022-07-11
Payer: COMMERCIAL

## 2022-07-12 ENCOUNTER — PATIENT MESSAGE (OUTPATIENT)
Dept: PULMONOLOGY | Facility: CLINIC | Age: 32
End: 2022-07-12
Payer: COMMERCIAL

## 2022-07-19 ENCOUNTER — PATIENT MESSAGE (OUTPATIENT)
Dept: INFECTIOUS DISEASES | Facility: CLINIC | Age: 32
End: 2022-07-19

## 2022-07-19 ENCOUNTER — OFFICE VISIT (OUTPATIENT)
Dept: INFECTIOUS DISEASES | Facility: CLINIC | Age: 32
End: 2022-07-19
Payer: COMMERCIAL

## 2022-07-19 DIAGNOSIS — Z71.85 VACCINE COUNSELING: Primary | ICD-10-CM

## 2022-07-19 DIAGNOSIS — R05.9 COUGH: Primary | ICD-10-CM

## 2022-07-19 DIAGNOSIS — G35 MS (MULTIPLE SCLEROSIS): ICD-10-CM

## 2022-07-19 DIAGNOSIS — D84.9 IMMUNOSUPPRESSION: ICD-10-CM

## 2022-07-19 PROCEDURE — 1160F RVW MEDS BY RX/DR IN RCRD: CPT | Mod: CPTII,95,, | Performed by: INTERNAL MEDICINE

## 2022-07-19 PROCEDURE — 99205 OFFICE O/P NEW HI 60 MIN: CPT | Mod: 95,,, | Performed by: INTERNAL MEDICINE

## 2022-07-19 PROCEDURE — 1159F MED LIST DOCD IN RCRD: CPT | Mod: CPTII,95,, | Performed by: INTERNAL MEDICINE

## 2022-07-19 PROCEDURE — 1159F PR MEDICATION LIST DOCUMENTED IN MEDICAL RECORD: ICD-10-PCS | Mod: CPTII,95,, | Performed by: INTERNAL MEDICINE

## 2022-07-19 PROCEDURE — 99205 PR OFFICE/OUTPT VISIT, NEW, LEVL V, 60-74 MIN: ICD-10-PCS | Mod: 95,,, | Performed by: INTERNAL MEDICINE

## 2022-07-19 PROCEDURE — 1160F PR REVIEW ALL MEDS BY PRESCRIBER/CLIN PHARMACIST DOCUMENTED: ICD-10-PCS | Mod: CPTII,95,, | Performed by: INTERNAL MEDICINE

## 2022-07-19 RX ORDER — PNEUMOCOCCAL 20-VALENT CONJUGATE VACCINE 2.2; 2.2; 2.2; 2.2; 2.2; 2.2; 2.2; 2.2; 2.2; 2.2; 2.2; 2.2; 2.2; 2.2; 2.2; 2.2; 4.4; 2.2; 2.2; 2.2 UG/.5ML; UG/.5ML; UG/.5ML; UG/.5ML; UG/.5ML; UG/.5ML; UG/.5ML; UG/.5ML; UG/.5ML; UG/.5ML; UG/.5ML; UG/.5ML; UG/.5ML; UG/.5ML; UG/.5ML; UG/.5ML; UG/.5ML; UG/.5ML; UG/.5ML; UG/.5ML
0.5 INJECTION, SUSPENSION INTRAMUSCULAR ONCE
Qty: 0.5 ML | Refills: 0 | Status: SHIPPED | OUTPATIENT
Start: 2022-07-19 | End: 2022-07-19

## 2022-07-19 RX ORDER — ZOSTER VACCINE RECOMBINANT, ADJUVANTED 50 MCG/0.5
0.5 KIT INTRAMUSCULAR
Qty: 2 EACH | Refills: 0 | Status: SHIPPED | OUTPATIENT
Start: 2022-07-19 | End: 2022-09-14

## 2022-07-19 NOTE — PROGRESS NOTES
The patient location is: Work  The chief complaint leading to consultation is: Pre-Biologic Evanluation    Visit type: audiovisual    Face to Face time with patient: 35  60 minutes of total time spent on the encounter, which includes face to face time and non-face to face time preparing to see the patient (eg, review of tests), Obtaining and/or reviewing separately obtained history, Documenting clinical information in the electronic or other health record, Independently interpreting results (not separately reported) and communicating results to the patient/family/caregiver, or Care coordination (not separately reported).         Each patient to whom he or she provides medical services by telemedicine is:  (1) informed of the relationship between the physician and patient and the respective role of any other health care provider with respect to management of the patient; and (2) notified that he or she may decline to receive medical services by telemedicine and may withdraw from such care at any time.    Notes:       Biologic Response Modifier Recipient Evaluation     Requesting Physician: Juan David    Reason for Visit: Vaccine counseling    History of Present Illness  31 y.o. female with MM on copaxone presents for vaccine counseling.      Patient currently on three day a week injection copaxone with plans to switch to Ocrevus.    History of infections:  Recurrent infections: No  COVID-19 - yes 7/2022  Admission for infection: No    Chicken Pox -yes in   Shingles - no    History of Immunosuppression:  Prior chemotherapy / immunosuppression - No  Prior transplant - No  History of splenectomy - No           Tuberculosis         Prior screening for latent TB - No  Prior treatment for latent TB - No    Geographical Exposures  Lived in the following states: [place]  [place] [facility] [place]  Traveled to the following countries: [place] 2019                       Job History:  Education in Technolology  field    Animal Exposure          Pets: Dog  Farm animals: No  Fishing / hunting:  enjoys fishing  Consumption of raw or undercooked meat, fish, shellfish:        Outdoor Hobbies   Gardening - No  Hiking / camping - No    Social History  Alcohol - Socially  Tobacco - No  Recreational Drugs - No  Sexual Partners - only     The patient's immunization history was reviewed.   Routine childhood vaccines - yes     COVID-19 vaccine, primary series, booster #1     Immunization History   Administered Date(s) Administered    COVID-19, MRNA, LN-S, PF (Pfizer) (Gray Cap) 02/09/2022    COVID-19, MRNA, LN-S, PF (Pfizer) (Purple Cap) 05/28/2021, 06/25/2021                Significant Labs Reviewed:    Hepatitis A Antibody IgG   Date Value Ref Range Status   06/17/2022 Negative  Final     Comment:     IgG anti-HAV not detected.     Hep B Core Total Ab   Date Value Ref Range Status   06/17/2022 Negative  Final     Hep B S Ab   Date Value Ref Range Status   06/17/2022 Positive  Final     Comment:     Individual is considered immune to HBV infection.     Hepatitis B Surface Ag   Date Value Ref Range Status   06/17/2022 Negative Negative Final     HIV 1/2 Ag/Ab   Date Value Ref Range Status   06/17/2022 Negative Negative Final     TB Gold Plus   Date Value Ref Range Status   06/17/2022 Negative Negative Final     Comment:     M. tuberculosis infection NOT likely.     RPR   Date Value Ref Range Status   06/17/2022 Non-reactive Non-reactive Final     Strongyloides Ab IgG   Date Value Ref Range Status   06/17/2022 Negative Negative Final     Comment:     No detectable levels of IgG antibodies to Strongyloides.  Repeat testing in 1-2 weeks if clinically indicated.    Test Performed by:  [facility]  [address] [place] [address]  : Eamon Ely M.D. Ph.D.; CLIA# [id number]       Varicella Interpretation   Date Value Ref Range Status   06/17/2022 Positive  (A) Negative Final     Comment:     <or=0.8    Negative        No detectable IgG antibody to Varicella zoster  by the EFREN test. Such individuals are presumed to be   uninfected with Varicella zoster and to be susceptible to   primary infection.    0.9-1.0    Equivocal    >or=1.1    Positive        Indicates presence of detectable IgG antibody to Varicella   zoster by the EFREN test. Indicative of previous or current   infection.              Assessment:   31 y.o. female with MM with plans to start Ocrevus presents for vaccine counseling.        Plan:     Biologic Response Modifier Candidacy:   Based on available information, there are no identified significant barriers to BRMs from an infectious disease standpoint.    Counseling:  - I discussed with the patient the risk for increased susceptibility to infections following BRM therapy including increased risk for infection.      - Specific guidance has been provided to the patient regarding the patients occupation, hobbies and activities to avoid future infectious complications including but not limited to avoiding undercooked meats and seafood, proper hygiene, and contact with animals.    - The patients has been counseled on the importance of vaccinations including but not limited to a yearly flu vaccine.    Immunizations:  Based on the patients immunization history and serologies, immunizations were ordered and sent to [facility].   - Prevnar 20  - TDaP  - Twinrix 0, 1, 6 months  - Shingrix 0, 2 months              The patient was encouraged to contact us about any problems that may develop after immunization and possible side effects were reviewed.     Carol Cordero MD MPH  Infectious Diseases [facility]

## 2022-09-14 ENCOUNTER — PATIENT MESSAGE (OUTPATIENT)
Dept: NEUROLOGY | Facility: CLINIC | Age: 32
End: 2022-09-14
Payer: COMMERCIAL

## 2022-09-14 DIAGNOSIS — G35 MS (MULTIPLE SCLEROSIS): Primary | ICD-10-CM

## 2022-09-16 NOTE — TELEPHONE ENCOUNTER
Spoke with pt, scheduled initial Ocrevus infusions on 9/30 and 10/14/22.   Repeat CBC scheduled on 9/22/22  Pt will get vaccines now for infusion in 2 weeks.

## 2022-10-31 ENCOUNTER — TELEPHONE (OUTPATIENT)
Dept: NEUROLOGY | Facility: CLINIC | Age: 32
End: 2022-10-31

## 2022-10-31 NOTE — TELEPHONE ENCOUNTER
----- Message from Oly Argueta sent at 10/31/2022  9:32 AM CDT -----  Contact: Pt  Pt is requesting a callback from provider staff. The pt have appt scheduled tomorrow 11/1 to disscuss medication. The pt is trying to see if she can switch it to virtual because she would have to travel from . Please adv. Pt.      Confirmed contact below:   Contact Name:Kendy Ryder  Phone Number: [phone]

## 2022-11-01 ENCOUNTER — OFFICE VISIT (OUTPATIENT)
Dept: NEUROLOGY | Facility: CLINIC | Age: 32
End: 2022-11-01
Payer: COMMERCIAL

## 2022-11-01 DIAGNOSIS — G35 MS (MULTIPLE SCLEROSIS): Primary | ICD-10-CM

## 2022-11-01 PROCEDURE — 1160F PR REVIEW ALL MEDS BY PRESCRIBER/CLIN PHARMACIST DOCUMENTED: ICD-10-PCS | Mod: CPTII,95,, | Performed by: PSYCHIATRY & NEUROLOGY

## 2022-11-01 PROCEDURE — 99215 PR OFFICE/OUTPT VISIT, EST, LEVL V, 40-54 MIN: ICD-10-PCS | Mod: 95,,, | Performed by: PSYCHIATRY & NEUROLOGY

## 2022-11-01 PROCEDURE — 1160F RVW MEDS BY RX/DR IN RCRD: CPT | Mod: CPTII,95,, | Performed by: PSYCHIATRY & NEUROLOGY

## 2022-11-01 PROCEDURE — 1159F MED LIST DOCD IN RCRD: CPT | Mod: CPTII,95,, | Performed by: PSYCHIATRY & NEUROLOGY

## 2022-11-01 PROCEDURE — 1159F PR MEDICATION LIST DOCUMENTED IN MEDICAL RECORD: ICD-10-PCS | Mod: CPTII,95,, | Performed by: PSYCHIATRY & NEUROLOGY

## 2022-11-01 PROCEDURE — 99215 OFFICE O/P EST HI 40 MIN: CPT | Mod: 95,,, | Performed by: PSYCHIATRY & NEUROLOGY

## 2022-11-01 NOTE — PROGRESS NOTES
The patient location is: work  The chief complaint leading to consultation is: MS     Visit type: audiovisual    Face to Face time with patient: 30  40 minutes of total time spent on the encounter, which includes face to face time and non-face to face time preparing to see the patient (eg, review of tests), Obtaining and/or reviewing separately obtained history, Documenting clinical information in the electronic or other health record, Independently interpreting results (not separately reported) and communicating results to the patient/family/caregiver, or Care coordination (not separately reported).     Each patient to whom he or she provides medical services by telemedicine is:  (1) informed of the relationship between the physician and patient and the respective role of any other health care provider with respect to management of the patient; and (2) notified that he or she may decline to receive medical services by telemedicine and may withdraw from such care at any time.    Subjective:          Patient ID: Kendy Ryder is a 31 y.o. female who presents today for a routine clinic visit for MS.      MS HPI:  DMT: glatiramer acetate but has not taken it in a month b/c script ran out    Has not yet started Ocrevus; First infusion is scheduled Friday but she's ambivalent for two reasons. First is that she plans to see fertility specialist soon to move forward with plans to conceive, and second is b/c she is simply afraid of risks of immunosuppression associated with Ocrevus.    Has not taken Copaxone in the month of October.  Never taken interferon but open to it.   Feeling neurologically stable    Medications:  Current Outpatient Medications   Medication Sig    alpha lipoic acid 100 mg Cap Take by mouth.    biotin 10,000 mcg Cap Take by mouth.    cholecalciferol, vitamin D3, 125 mcg (5,000 unit) Tab Take 10,000 Units by mouth once daily.    glatiramer (COPAXONE, GLATOPA) 40 mg/mL injection Inject 40 mg into  the skin 3 (three) times a week.    hepatitis A and B vaccine, PF, (TWINRIX) 720 EFREN unit- 20 mcg/mL Syrg suspension Inject 1 mL (720 Units total) into the muscle As instructed.     No current facility-administered medications for this visit.       SOCIAL HISTORY  Social History     Tobacco Use    Smoking status: Never    Smokeless tobacco: Never   Substance Use Topics    Alcohol use: No    Drug use: No           Objective:        Timed 25 Foot Walk: 8/12/2021 5/25/2022   Did patient wear an AFO? No No   Was assistive device used? No No   Time for 25 Foot Walk (seconds) 3.8 3.9   Time for 25 Foot Walk (seconds) 3.8 -     Neurologic Exam      Imaging:     Results for orders placed during the hospital encounter of 07/24/21    MRI Brain Demyelinating Without Contrast    Impression  Findings in the cerebral white matter which are typical for multiple sclerosis.  New left occipital white matter plaque without restricted diffusion to suggest active demyelinization..      Electronically signed by: Taqueria Flores MD  Date:    07/25/2021  Time:    16:23        Results for orders placed during the hospital encounter of 05/12/22    MRI Brain Demyelinating W W/O Contrast    Impression  Findings in the cerebral white matter which are typical for multiple sclerosis.    Acute plaque noted in the pericallosal white matter bilaterally likely representing active disease.    Overall however disease burden is similar to prior.      Electronically signed by: Theo Naranjo  Date:    05/12/2022  Time:    20:16    Results for orders placed during the hospital encounter of 01/18/19    MRI Cervical Spine Demyelinating W W/O Contrast    Impression  Unremarkable motion limited MRI of the cervical spine as detailed above      Electronically signed by: Naren Santiago DO  Date:    01/18/2019  Time:    12:53    No results found for this or any previous visit.        Labs:     Lab Results   Component Value Date    LRGPOCCB45RL 88 01/27/2021     ZNYHOHRB12MN 66 10/02/2019    YZBZNPZG96YE 46 02/06/2019     Lab Results   Component Value Date    JCVINDEX 0.80 (A) 02/14/2018    JCVANTIBODY Positive (A) 02/14/2018     Lab Results   Component Value Date    TI0TAZIE 70.7 02/06/2019    ABSOLUTECD3 3070 (H) 02/06/2019    WB7RKDWA 14.5 02/06/2019    ABSOLUTECD8 630 02/06/2019    FS1PYKYE 54.2 02/06/2019    ABSOLUTECD4 2350 (H) 02/06/2019    LABCD48 3.73 (H) 02/06/2019     Lab Results   Component Value Date    WBC 13.58 (H) 06/17/2022    RBC 4.65 06/17/2022    HGB 9.4 (L) 06/17/2022    HCT 33.4 (L) 06/17/2022    MCV 72 (L) 06/17/2022    MCH 20.2 (L) 06/17/2022    MCHC 28.1 (L) 06/17/2022    RDW 20.7 (H) 06/17/2022     (H) 06/17/2022    MPV 11.7 06/17/2022    GRAN 8.1 (H) 06/17/2022    GRAN 59.5 06/17/2022    LYMPH 4.5 06/17/2022    LYMPH 33.4 06/17/2022    MONO 0.8 06/17/2022    MONO 5.7 06/17/2022    EOS 0.1 06/17/2022    BASO 0.06 06/17/2022    EOSINOPHIL 0.7 06/17/2022    BASOPHIL 0.4 06/17/2022     Sodium   Date Value Ref Range Status   01/16/2019 137 136 - 145 mmol/L Final     Potassium   Date Value Ref Range Status   01/16/2019 3.8 3.5 - 5.1 mmol/L Final     Chloride   Date Value Ref Range Status   01/16/2019 106 95 - 110 mmol/L Final     CO2   Date Value Ref Range Status   01/16/2019 23 23 - 29 mmol/L Final     Glucose   Date Value Ref Range Status   01/16/2019 85 70 - 110 mg/dL Final     BUN   Date Value Ref Range Status   01/16/2019 8 6 - 20 mg/dL Final     Creatinine   Date Value Ref Range Status   01/16/2019 0.8 0.5 - 1.4 mg/dL Final     Calcium   Date Value Ref Range Status   01/16/2019 9.2 8.7 - 10.5 mg/dL Final     Total Protein   Date Value Ref Range Status   01/16/2019 7.3 6.0 - 8.4 g/dL Final     Albumin   Date Value Ref Range Status   01/16/2019 3.6 3.5 - 5.2 g/dL Final     Total Bilirubin   Date Value Ref Range Status   01/16/2019 0.3 0.1 - 1.0 mg/dL Final     Comment:     For infants and newborns, interpretation of results should be  based  on gestational age, weight and in agreement with clinical  observations.  Premature Infant recommended reference ranges:  Up to 24 hours.............<8.0 mg/dL  Up to 48 hours............<12.0 mg/dL  3-5 days..................<15.0 mg/dL  6-29 days.................<15.0 mg/dL       Alkaline Phosphatase   Date Value Ref Range Status   01/16/2019 61 55 - 135 U/L Final     AST   Date Value Ref Range Status   01/16/2019 21 10 - 40 U/L Final     ALT   Date Value Ref Range Status   01/16/2019 17 10 - 44 U/L Final     Anion Gap   Date Value Ref Range Status   01/16/2019 8 8 - 16 mmol/L Final     eGFR if    Date Value Ref Range Status   01/16/2019 >60 >60 mL/min/1.73 m^2 Final     eGFR if non    Date Value Ref Range Status   01/16/2019 >60 >60 mL/min/1.73 m^2 Final     Comment:     Calculation used to obtain the estimated glomerular filtration  rate (eGFR) is the CKD-EPI equation.        Lab Results   Component Value Date    HEPBSAG Negative 06/17/2022    HEPBSAB Positive 06/17/2022    HEPBCAB Negative 06/17/2022           MS Impression and Plan:     NEURO MULTIPLE SCLEROSIS IMPRESSION:   MS Status:     Clinical Progression:  N/A  Plan:     DMT:  Switch Disease Modifying therapy    Switch Disease Modifying Therapy FROM:  Glatiramer acetate    TO:  Peginterferon beta-1a    Symptom Management:  No change in symptom management     After shared decision making, will not proceed with Ocrevus as planned this summer, but rather change DMT to Plegridy IM.  She is not comfortable with risks of Ocrevus at this time, and not eager to have delays in her plan to conceive.  The rationale, side effects, risks and expectations of Plegridy medication were discussed.  Pt understands that based on recently published real word data, interferon beta is generally thought to be safe during pregnancy and breastfeeding.   Will check baseline labs this week, and f/u with [facility] in 8 weeks virtually               Problem List Items Addressed This Visit    None      Raquel Fall MD

## 2022-11-01 NOTE — Clinical Note
She does not want to do Ocrevus after all.  Will start Plegridy IM; SP, can you please send Britneyidy to me for sig?  She's getting labs this week.  Thanks

## 2022-11-04 ENCOUNTER — TELEPHONE (OUTPATIENT)
Dept: NEUROLOGY | Facility: CLINIC | Age: 32
End: 2022-11-04
Payer: COMMERCIAL

## 2022-11-04 NOTE — TELEPHONE ENCOUNTER
Spoke to pt and scheduled her labs for 11/7 at 3 PM at [facility] and VV with AP on 1/17 at 8:30 AM.

## 2022-11-04 NOTE — TELEPHONE ENCOUNTER
----- Message from Raquel Fall MD sent at 11/1/2022  8:54 AM CDT -----  1. Labs this week at the [facility] in 10 weeks VV

## 2022-11-14 ENCOUNTER — LAB VISIT (OUTPATIENT)
Dept: LAB | Facility: HOSPITAL | Age: 32
End: 2022-11-14
Attending: FAMILY MEDICINE
Payer: COMMERCIAL

## 2022-11-14 DIAGNOSIS — G35 MS (MULTIPLE SCLEROSIS): ICD-10-CM

## 2022-11-14 LAB
ALBUMIN SERPL BCP-MCNC: 3.4 G/DL (ref 3.5–5.2)
ALP SERPL-CCNC: 64 U/L (ref 55–135)
ALT SERPL W/O P-5'-P-CCNC: 10 U/L (ref 10–44)
AST SERPL-CCNC: 15 U/L (ref 10–40)
BASOPHILS # BLD AUTO: 0.05 K/UL (ref 0–0.2)
BASOPHILS NFR BLD: 0.4 % (ref 0–1.9)
BILIRUB DIRECT SERPL-MCNC: 0.1 MG/DL (ref 0.1–0.3)
BILIRUB SERPL-MCNC: 0.2 MG/DL (ref 0.1–1)
DIFFERENTIAL METHOD: ABNORMAL
EOSINOPHIL # BLD AUTO: 0.2 K/UL (ref 0–0.5)
EOSINOPHIL NFR BLD: 1.2 % (ref 0–8)
ERYTHROCYTE [DISTWIDTH] IN BLOOD BY AUTOMATED COUNT: 19.3 % (ref 11.5–14.5)
HCT VFR BLD AUTO: 34.7 % (ref 37–48.5)
HGB BLD-MCNC: 9.9 G/DL (ref 12–16)
IMM GRANULOCYTES # BLD AUTO: 0.04 K/UL (ref 0–0.04)
IMM GRANULOCYTES NFR BLD AUTO: 0.3 % (ref 0–0.5)
LYMPHOCYTES # BLD AUTO: 5.9 K/UL (ref 1–4.8)
LYMPHOCYTES NFR BLD: 43.7 % (ref 18–48)
MCH RBC QN AUTO: 19.7 PG (ref 27–31)
MCHC RBC AUTO-ENTMCNC: 28.5 G/DL (ref 32–36)
MCV RBC AUTO: 69 FL (ref 82–98)
MONOCYTES # BLD AUTO: 0.6 K/UL (ref 0.3–1)
MONOCYTES NFR BLD: 4.7 % (ref 4–15)
NEUTROPHILS # BLD AUTO: 6.8 K/UL (ref 1.8–7.7)
NEUTROPHILS NFR BLD: 49.7 % (ref 38–73)
NRBC BLD-RTO: 0 /100 WBC
PLATELET # BLD AUTO: 392 K/UL (ref 150–450)
PMV BLD AUTO: 10.5 FL (ref 9.2–12.9)
PROT SERPL-MCNC: 7.3 G/DL (ref 6–8.4)
RBC # BLD AUTO: 5.03 M/UL (ref 4–5.4)
WBC # BLD AUTO: 13.6 K/UL (ref 3.9–12.7)

## 2022-11-14 PROCEDURE — 36415 COLL VENOUS BLD VENIPUNCTURE: CPT | Performed by: PSYCHIATRY & NEUROLOGY

## 2022-11-14 PROCEDURE — 85025 COMPLETE CBC W/AUTO DIFF WBC: CPT | Performed by: PSYCHIATRY & NEUROLOGY

## 2022-11-14 PROCEDURE — 80076 HEPATIC FUNCTION PANEL: CPT | Performed by: PSYCHIATRY & NEUROLOGY

## 2022-11-18 DIAGNOSIS — G35 MULTIPLE SCLEROSIS: Primary | ICD-10-CM

## 2022-11-18 RX ORDER — PEGINTERFERON BETA-1A 125 UG/.5ML
INJECTION, SOLUTION INTRAMUSCULAR
Qty: 3 ML | Refills: 1 | Status: CANCELLED | OUTPATIENT
Start: 2022-11-18

## 2022-11-18 RX ORDER — PEGINTERFERON BETA-1A 63-94 MCG
KIT SUBCUTANEOUS
Qty: 1 ML | Refills: 0 | Status: CANCELLED | OUTPATIENT
Start: 2022-11-18

## 2022-11-18 NOTE — TELEPHONE ENCOUNTER
----- Message from Raquel Fall MD sent at 11/1/2022  8:57 AM CDT -----  She does not want to do Ocrevus after all.  Will start Plegridy IM; SP, can you please send Plegridy to me for sig?  She's getting labs this week.  Thanks

## 2022-11-22 RX ORDER — PEGINTERFERON BETA-1A 125 UG/.5ML
INJECTION, SOLUTION INTRAMUSCULAR
Qty: 3 ML | Refills: 1 | Status: SHIPPED | OUTPATIENT
Start: 2022-11-22 | End: 2023-02-05

## 2022-11-22 RX ORDER — PEGINTERFERON BETA-1A 63-94 MCG
KIT SUBCUTANEOUS
Qty: 1 ML | Refills: 0 | Status: SHIPPED | OUTPATIENT
Start: 2022-11-22 | End: 2022-11-29

## 2022-11-25 ENCOUNTER — SPECIALTY PHARMACY (OUTPATIENT)
Dept: PHARMACY | Facility: CLINIC | Age: 32
End: 2022-11-25
Payer: COMMERCIAL

## 2022-11-28 LAB — PAP RECOMMENDATION EXT: NORMAL

## 2022-11-28 NOTE — TELEPHONE ENCOUNTER
Received Plegridy orders for SUBQ titration pens and IM syringes maintenance. InBasket sent to assess if patient should inject this way or if she should inject IM for both titration and maintenance. If so, new Rx for 125mcg/0.5mL syinges written for titration directions is needed.

## 2022-11-29 NOTE — TELEPHONE ENCOUNTER
Leslye RODRIGUEZ submitted 11/11/22  [facility]: [id number]    Hello, this is Pilo Gomez with [facility].  We are working on your prescription that your doctor has sent us. We will be working with your insurance to get this approved for you. We will be calling you along the way with updates on your medication.  If you have any questions, you can reach us at [phone].    Welcome call outcome: Patient/caregiver reached

## 2022-11-30 NOTE — TELEPHONE ENCOUNTER
----- Message from Skyler Raman sent at 11/30/2022 11:26 AM CST -----  Contact: [phone]    [facility] states that pt meds peginterferon beta-1a (PLEGRIDY) 125 mcg/0.5 mL Syrg is not covered under her insurance -- they are requesting a PA -- please reach out to them [phone]     Ref number [id number]

## 2022-12-01 ENCOUNTER — PATIENT MESSAGE (OUTPATIENT)
Dept: PSYCHIATRY | Facility: CLINIC | Age: 32
End: 2022-12-01
Payer: COMMERCIAL

## 2022-12-05 NOTE — TELEPHONE ENCOUNTER
Leslye RODRIGUEZ approved 10/30/22 to 11/29/23  Case ID: [id number]    Patient locked into [facility]. Routed Rx and spoke with patient to inform. She verbalized understanding. Closing referral at [facility].

## 2022-12-15 ENCOUNTER — TELEPHONE (OUTPATIENT)
Dept: NEUROLOGY | Facility: CLINIC | Age: 32
End: 2022-12-15
Payer: COMMERCIAL

## 2022-12-16 NOTE — TELEPHONE ENCOUNTER
----- Message from Margo Lopez sent at 12/15/2022 11:11 AM CST -----  Regarding: Prior Authorization  Contact: Alejandro @ [employer]  Alejandro @ [employer] is calling to get Prior Authorization on medication: peginterferon beta-1a (PLEGRIDY) 63 mcg/0.5 mL- 94 mcg/0.5 mL PnIj (Discontinued) 1  . Asking for a call back. Office need to call [employer]

## 2022-12-19 ENCOUNTER — PATIENT MESSAGE (OUTPATIENT)
Dept: NEUROLOGY | Facility: CLINIC | Age: 32
End: 2022-12-19
Payer: COMMERCIAL

## 2022-12-22 ENCOUNTER — TELEPHONE (OUTPATIENT)
Dept: NEUROLOGY | Facility: CLINIC | Age: 32
End: 2022-12-22
Payer: COMMERCIAL

## 2022-12-22 NOTE — TELEPHONE ENCOUNTER
----- Message from Margo Lopez sent at 12/22/2022 11:23 AM CST -----  Regarding: Pror Authorization Needed  Contact: Soumya @ [phone]  La Nena from [facility] is calling to get Prior Authorization for medication:     peginterferon beta-1a (PLEGRIDY) 125 mcg/0.5 mL Syrg    Ref# [id number]

## 2022-12-27 ENCOUNTER — TELEPHONE (OUTPATIENT)
Dept: NEUROLOGY | Facility: CLINIC | Age: 32
End: 2022-12-27
Payer: COMMERCIAL

## 2022-12-27 NOTE — TELEPHONE ENCOUNTER
----- Message from Miroslava Adams sent at 12/27/2022  3:01 PM CST -----  Regarding: advise  Contact: [phone]  Camilla Johnson calling stated she have not been able to reach pt for training.   Phone # was verified.   call back [phone]

## 2022-12-27 NOTE — TELEPHONE ENCOUNTER
----- Message from Lora Lopez sent at 12/27/2022  1:49 PM CST -----  Regarding: Prior Auth  Contact: [phone]  Pt calling to get prior auth for the following meds peginterferon beta-1a (PLEGRIDY) 125 mcg/0.5 mL Syrg. Please call and adv [phone] Ref [id number]

## 2022-12-29 ENCOUNTER — PATIENT MESSAGE (OUTPATIENT)
Dept: PHARMACY | Facility: CLINIC | Age: 32
End: 2022-12-29
Payer: COMMERCIAL

## 2022-12-29 NOTE — TELEPHONE ENCOUNTER
Received message from Yumiko Betancourt RN that [facility] is stating a PA is required. Called plan to see what the issue is and the rep stated someone opened a new PA case, even though there was an approval on file. Completed new PA case ID: [id number]. Approved 12/29/22 to 12/29/23. Rep also stated patient will need to call [employer] @ [phone] to enroll prior to filling with [facility]. The claim will continue to reject until she calls. LVM to inform patient and MyChart message sent.

## 2023-01-03 ENCOUNTER — TELEPHONE (OUTPATIENT)
Dept: NEUROLOGY | Facility: CLINIC | Age: 33
End: 2023-01-03
Payer: COMMERCIAL

## 2023-01-04 ENCOUNTER — OFFICE VISIT (OUTPATIENT)
Dept: INTERNAL MEDICINE | Facility: CLINIC | Age: 33
End: 2023-01-04
Payer: COMMERCIAL

## 2023-01-04 VITALS
WEIGHT: 293 LBS | DIASTOLIC BLOOD PRESSURE: 88 MMHG | RESPIRATION RATE: 18 BRPM | SYSTOLIC BLOOD PRESSURE: 134 MMHG | BODY MASS INDEX: 45.99 KG/M2 | HEIGHT: 67 IN

## 2023-01-04 DIAGNOSIS — Z00.00 ROUTINE PHYSICAL EXAMINATION: Primary | ICD-10-CM

## 2023-01-04 PROCEDURE — 99999 PR PBB SHADOW E&M-EST. PATIENT-LVL III: ICD-10-PCS | Mod: PBBFAC,,, | Performed by: FAMILY MEDICINE

## 2023-01-04 PROCEDURE — 1159F PR MEDICATION LIST DOCUMENTED IN MEDICAL RECORD: ICD-10-PCS | Mod: CPTII,S$GLB,, | Performed by: FAMILY MEDICINE

## 2023-01-04 PROCEDURE — 3008F PR BODY MASS INDEX (BMI) DOCUMENTED: ICD-10-PCS | Mod: CPTII,S$GLB,, | Performed by: FAMILY MEDICINE

## 2023-01-04 PROCEDURE — 3008F BODY MASS INDEX DOCD: CPT | Mod: CPTII,S$GLB,, | Performed by: FAMILY MEDICINE

## 2023-01-04 PROCEDURE — 99999 PR PBB SHADOW E&M-EST. PATIENT-LVL III: CPT | Mod: PBBFAC,,, | Performed by: FAMILY MEDICINE

## 2023-01-04 PROCEDURE — 3079F DIAST BP 80-89 MM HG: CPT | Mod: CPTII,S$GLB,, | Performed by: FAMILY MEDICINE

## 2023-01-04 PROCEDURE — 1159F MED LIST DOCD IN RCRD: CPT | Mod: CPTII,S$GLB,, | Performed by: FAMILY MEDICINE

## 2023-01-04 PROCEDURE — 3075F PR MOST RECENT SYSTOLIC BLOOD PRESS GE 130-139MM HG: ICD-10-PCS | Mod: CPTII,S$GLB,, | Performed by: FAMILY MEDICINE

## 2023-01-04 PROCEDURE — 3079F PR MOST RECENT DIASTOLIC BLOOD PRESSURE 80-89 MM HG: ICD-10-PCS | Mod: CPTII,S$GLB,, | Performed by: FAMILY MEDICINE

## 2023-01-04 PROCEDURE — 99395 PR PREVENTIVE VISIT,EST,18-39: ICD-10-PCS | Mod: S$GLB,,, | Performed by: FAMILY MEDICINE

## 2023-01-04 PROCEDURE — 99395 PREV VISIT EST AGE 18-39: CPT | Mod: S$GLB,,, | Performed by: FAMILY MEDICINE

## 2023-01-04 PROCEDURE — 3075F SYST BP GE 130 - 139MM HG: CPT | Mod: CPTII,S$GLB,, | Performed by: FAMILY MEDICINE

## 2023-01-04 NOTE — PROGRESS NOTES
Kendy Ryder  01/04/2023  [id number]    Althea Luo MD  Patient Care Team:  Althea Luo MD as PCP - General (Internal Medicine)    Has the patient seen any provider outside of the network since the last visit ? (no). If yes, HIPPA forms completed and records requested.      Visit Type:a scheduled routine follow-up visit    Chief Complaint:  Chief Complaint   Patient presents with    [facility]    Obesity    ADHD       History of Present Illness:  HPI Ms. Ryder presents today to establish     Establish care  She has a medical history listed below     Health maintenance reviewed and updated   Pt declines vaccinations today    ADHD concerns   Never had issues in the past   The older she has gotten she notes not able to focus and procrastination       In May went plant based and this has helped   Diagnosed with MS in 2017     Treadmill and pelaton bike   3-4 times a week   Treadmill once a week     Review of Systems   Constitutional:  Negative for chills and fever.   HENT:  Negative for congestion and tinnitus.    Eyes:  Negative for blurred vision, pain and discharge.   Respiratory:  Negative for cough and wheezing.    Cardiovascular:  Negative for chest pain, palpitations, orthopnea and leg swelling.   Gastrointestinal:  Negative for abdominal pain, blood in stool, constipation, diarrhea, heartburn, nausea and vomiting.   Genitourinary:  Negative for dysuria, flank pain, frequency, hematuria and urgency.   Skin:  Negative for itching and rash.   Neurological:  Negative for dizziness, tingling and headaches.   Psychiatric/Behavioral:  Negative for depression.        Screening Questionnaires:    In the last two weeks how often have you felt down, depressed, or hopeless ( no )    In the last two weeks how often have you had little interest or pleasure in doing  (no )    In the last two weeks how often have you been bothered by the following problems:  1. Feeling nervous, anxious, or on edge ( no )    2.  Not being able to stop or control worrying ( no)    3. Worrying too much about different things ( no)    4. Trouble relaxing ( no )    5. Being so restless that it is hard to sit still  (no )    6. Becoming easily annoyed or irritable (no)    7. Feeling afraid as if something awful might happen (no )    How often do you have a drink containing Alcohol? denied     Do you exercise  (no ) moderately active    Do you take a baby Aspirin daily ( no)    Do you have an advance directive ( no ) The patient was given information regarding Living Will/Durable Power-of- if requested.     The following were reviewed: Active problem list, medication list, allergies, family history, social history, and Health Maintenance.     History:  Past Medical History:   Diagnosis Date    Anemia     Multiple sclerosis      Past Surgical History:   Procedure Laterality Date    DILATION AND CURETTAGE OF UTERUS       Family History   Problem Relation Age of Onset    Hypertension Father     No Known Problems Mother     Cancer Maternal Grandmother     Stroke Maternal Uncle      Social History     Socioeconomic History    Marital status:    Tobacco Use    Smoking status: Never    Smokeless tobacco: Never   Substance and Sexual Activity    Alcohol use: Not Currently    Drug use: No    Sexual activity: Yes     Partners: Male     Birth control/protection: None     Social Determinants of Health     Financial Resource Strain: Low Risk     Difficulty of Paying Living Expenses: Not hard at all   Food Insecurity: No Food Insecurity    Worried About Running Out of Food in the Last Year: Never true    Ran Out of Food in the Last Year: Never true   Transportation Needs: No Transportation Needs    Lack of Transportation (Medical): No    Lack of Transportation (Non-Medical): No   Physical Activity: Insufficiently Active    Days of Exercise per Week: 3 days    Minutes of Exercise per Session: 30 min   Stress: No Stress Concern Present    Feeling  of Stress : Not at all   Social Connections: Unknown    Frequency of Communication with Friends and Family: More than three times a week    Frequency of Social Gatherings with Friends and Family: Once a week    Active Member of Clubs or Organizations: Yes    Attends Club or Organization Meetings: More than 4 times per year    Marital Status:    Housing Stability: Low Risk     Unable to Pay for Housing in the Last Year: No    Number of Places Lived in the Last Year: 1    Unstable Housing in the Last Year: No     Patient Active Problem List   Diagnosis    Morbid obesity with BMI of 60.0-69.9, adult    Iron deficiency    Chronic fatigue    Anemia    Multiple sclerosis    Counseling regarding goals of care    Vitamin D insufficiency    Abnormal brain MRI    Prophylactic immunotherapy     Review of patient's allergies indicates:  No Known Allergies    Health Maintenance  The patient has no Health Maintenance topics of status Not Due  Health Maintenance Due   Topic Date Due    Lipid Panel  Never done    Pneumococcal Vaccines (Age 0-64) (1 - PCV) Never done    TETANUS VACCINE  Never done    Cervical Cancer Screening  03/26/2022    COVID-19 Vaccine (4 - Booster for Pfizer series) 04/06/2022    Influenza Vaccine (1) Never done       Medications:  Current Outpatient Medications on File Prior to Visit   Medication Sig Dispense Refill    alpha lipoic acid 100 mg Cap Take by mouth.      biotin 10,000 mcg Cap Take by mouth.      cholecalciferol, vitamin D3, 125 mcg (5,000 unit) Tab Take 10,000 Units by mouth once daily.      peginterferon beta-1a (PLEGRIDY) 125 mcg/0.5 mL Syrg Inject Plegridy IM 125mcg every 2 weeks starting at day 29. 3 mL 1    peginterferon beta-1a 125 mcg/0.5 mL Syrg Inject 63 mcg into the muscle on day 1 and 94 mcg into the muscle on day 15. Use with provided start . 1 mL 0    glatiramer (COPAXONE, GLATOPA) 40 mg/mL injection Inject 40 mg into the skin 3 (three) times a week. (Patient not taking:  Reported on 1/4/2023) 12 mL 0     No current facility-administered medications on file prior to visit.       Medications have been reviewed and reconciled with patient at visit today.    Barriers to medications present (no )    Adverse reactions to current medications (no)    Over the counter medications reviewed (Yes) and if needed added to active Medication list.    Exam:  Vitals:    01/04/23 1037   BP: 134/88   Resp: 18     Weight: (!) 170.5 kg (375 lb 14.2 oz)   Body mass index is 58.87 kg/m².      Physical Exam  Vitals reviewed.   Constitutional:       General: She is not in acute distress.     Appearance: Normal appearance. She is obese.   HENT:      Head: Normocephalic and atraumatic.      Right Ear: External ear normal.      Left Ear: External ear normal.      Nose: Nose normal.   Eyes:      General:         Right eye: No discharge.         Left eye: No discharge.      Pupils: Pupils are equal, round, and reactive to light.   Neck:      Thyroid: No thyromegaly.   Cardiovascular:      Rate and Rhythm: Normal rate and regular rhythm.      Heart sounds: Normal heart sounds. No murmur heard.  Pulmonary:      Effort: Pulmonary effort is normal. No respiratory distress.      Breath sounds: Normal breath sounds. No wheezing.   Abdominal:      General: Bowel sounds are normal. There is no distension.      Palpations: Abdomen is soft.      Tenderness: There is no abdominal tenderness.   Musculoskeletal:         General: Normal range of motion.      Cervical back: Normal range of motion and neck supple.   Skin:     General: Skin is warm and dry.      Findings: No rash.   Neurological:      Mental Status: She is alert and oriented to person, place, and time.      Coordination: Coordination normal.   Psychiatric:         Behavior: Behavior normal.       Laboratory Reviewed: (Yes)  Lab Results   Component Value Date    WBC 13.60 (H) 11/14/2022    HGB 9.9 (L) 11/14/2022    HCT 34.7 (L) 11/14/2022     11/14/2022     ALT 10 11/14/2022    AST 15 11/14/2022     01/16/2019    K 3.8 01/16/2019     01/16/2019    CREATININE 0.8 01/16/2019    BUN 8 01/16/2019    CO2 23 01/16/2019    TSH 2.612 07/25/2016    HGBA1C 5.7 07/25/2016       Assessment:  There were no encounter diagnoses.    Plan:  There are no diagnoses linked to this encounter.  -Patient's lab results were reviewed and discussed with patient  -Treatment options and alternatives were discussed with the patient. Patient expressed understanding. Patient was given the opportunity to ask questions and be an active participant in their medical care. Patient had no further questions or concerns at this time.   -Documentation of patient's health and condition was obtained from family member who was present during visit.  -Patient is an overall moderate risk for health complications from their medical conditions.     Follow up: follow up 1 year sooner if needed       Care Plan/Goals: Reviewed N/A   Goals    None             After visit summary printed and given to patient upon discharge.  Patient goals and care plan are included in After visit summary.

## 2023-01-04 NOTE — TELEPHONE ENCOUNTER
----- Message from Oly Argueta sent at 1/3/2023  4:18 PM CST -----  Contact: [facility]  Pharmacy is requesting a callabck in regards to PA for Pt medication:  peginterferon beta-1a 125 mcg/0.5 mL Syrg 1 mL         Contact PA [phone]  Direct Line [facility] [phone] Ext# [phone]        [facility] - [place], [place] - [address] [place] [id number]  Phone: [phone] Fax: [phone]

## 2023-01-09 ENCOUNTER — LAB VISIT (OUTPATIENT)
Dept: LAB | Facility: HOSPITAL | Age: 33
End: 2023-01-09
Attending: FAMILY MEDICINE
Payer: COMMERCIAL

## 2023-01-09 DIAGNOSIS — Z00.00 ROUTINE PHYSICAL EXAMINATION: ICD-10-CM

## 2023-01-09 LAB
BASOPHILS # BLD AUTO: 0.05 K/UL (ref 0–0.2)
BASOPHILS NFR BLD: 0.4 % (ref 0–1.9)
DIFFERENTIAL METHOD: ABNORMAL
EOSINOPHIL # BLD AUTO: 0.1 K/UL (ref 0–0.5)
EOSINOPHIL NFR BLD: 0.9 % (ref 0–8)
ERYTHROCYTE [DISTWIDTH] IN BLOOD BY AUTOMATED COUNT: 19.5 % (ref 11.5–14.5)
HCT VFR BLD AUTO: 35.5 % (ref 37–48.5)
HGB BLD-MCNC: 9.6 G/DL (ref 12–16)
IMM GRANULOCYTES # BLD AUTO: 0.02 K/UL (ref 0–0.04)
IMM GRANULOCYTES NFR BLD AUTO: 0.2 % (ref 0–0.5)
LYMPHOCYTES # BLD AUTO: 5.2 K/UL (ref 1–4.8)
LYMPHOCYTES NFR BLD: 45.2 % (ref 18–48)
MCH RBC QN AUTO: 19.2 PG (ref 27–31)
MCHC RBC AUTO-ENTMCNC: 27 G/DL (ref 32–36)
MCV RBC AUTO: 71 FL (ref 82–98)
MONOCYTES # BLD AUTO: 0.5 K/UL (ref 0.3–1)
MONOCYTES NFR BLD: 4.3 % (ref 4–15)
NEUTROPHILS # BLD AUTO: 5.6 K/UL (ref 1.8–7.7)
NEUTROPHILS NFR BLD: 49 % (ref 38–73)
NRBC BLD-RTO: 0 /100 WBC
PLATELET # BLD AUTO: 489 K/UL (ref 150–450)
PMV BLD AUTO: 10.5 FL (ref 9.2–12.9)
RBC # BLD AUTO: 4.99 M/UL (ref 4–5.4)
WBC # BLD AUTO: 11.5 K/UL (ref 3.9–12.7)

## 2023-01-09 PROCEDURE — 80053 COMPREHEN METABOLIC PANEL: CPT | Performed by: FAMILY MEDICINE

## 2023-01-09 PROCEDURE — 80061 LIPID PANEL: CPT | Performed by: FAMILY MEDICINE

## 2023-01-09 PROCEDURE — 85025 COMPLETE CBC W/AUTO DIFF WBC: CPT | Performed by: FAMILY MEDICINE

## 2023-01-09 PROCEDURE — 36415 COLL VENOUS BLD VENIPUNCTURE: CPT | Performed by: FAMILY MEDICINE

## 2023-01-09 PROCEDURE — 84443 ASSAY THYROID STIM HORMONE: CPT | Performed by: FAMILY MEDICINE

## 2023-01-09 PROCEDURE — 83036 HEMOGLOBIN GLYCOSYLATED A1C: CPT | Performed by: FAMILY MEDICINE

## 2023-01-10 LAB
ALBUMIN SERPL BCP-MCNC: 3.4 G/DL (ref 3.5–5.2)
ALP SERPL-CCNC: 68 U/L (ref 55–135)
ALT SERPL W/O P-5'-P-CCNC: 11 U/L (ref 10–44)
ANION GAP SERPL CALC-SCNC: 11 MMOL/L (ref 8–16)
AST SERPL-CCNC: 20 U/L (ref 10–40)
BILIRUB SERPL-MCNC: 0.4 MG/DL (ref 0.1–1)
BUN SERPL-MCNC: 10 MG/DL (ref 6–20)
CALCIUM SERPL-MCNC: 9.5 MG/DL (ref 8.7–10.5)
CHLORIDE SERPL-SCNC: 109 MMOL/L (ref 95–110)
CHOLEST SERPL-MCNC: 148 MG/DL (ref 120–199)
CHOLEST/HDLC SERPL: 3.1 {RATIO} (ref 2–5)
CO2 SERPL-SCNC: 21 MMOL/L (ref 23–29)
CREAT SERPL-MCNC: 0.8 MG/DL (ref 0.5–1.4)
EST. GFR  (NO RACE VARIABLE): >60 ML/MIN/1.73 M^2
ESTIMATED AVG GLUCOSE: 108 MG/DL (ref 68–131)
GLUCOSE SERPL-MCNC: 69 MG/DL (ref 70–110)
HBA1C MFR BLD: 5.4 % (ref 4–5.6)
HDLC SERPL-MCNC: 47 MG/DL (ref 40–75)
HDLC SERPL: 31.8 % (ref 20–50)
LDLC SERPL CALC-MCNC: 82.6 MG/DL (ref 63–159)
NONHDLC SERPL-MCNC: 101 MG/DL
POTASSIUM SERPL-SCNC: 4.1 MMOL/L (ref 3.5–5.1)
PROT SERPL-MCNC: 7 G/DL (ref 6–8.4)
SODIUM SERPL-SCNC: 141 MMOL/L (ref 136–145)
TRIGL SERPL-MCNC: 92 MG/DL (ref 30–150)
TSH SERPL DL<=0.005 MIU/L-ACNC: 1.69 UIU/ML (ref 0.4–4)

## 2023-01-11 ENCOUNTER — PATIENT OUTREACH (OUTPATIENT)
Dept: ADMINISTRATIVE | Facility: HOSPITAL | Age: 33
End: 2023-01-11
Payer: COMMERCIAL

## 2023-01-22 ENCOUNTER — PATIENT MESSAGE (OUTPATIENT)
Dept: INTERNAL MEDICINE | Facility: CLINIC | Age: 33
End: 2023-01-22
Payer: COMMERCIAL

## 2023-01-22 DIAGNOSIS — D64.9 ANEMIA, UNSPECIFIED TYPE: Primary | ICD-10-CM

## 2023-01-23 ENCOUNTER — PATIENT MESSAGE (OUTPATIENT)
Dept: INTERNAL MEDICINE | Facility: CLINIC | Age: 33
End: 2023-01-23
Payer: COMMERCIAL

## 2023-01-24 ENCOUNTER — PATIENT MESSAGE (OUTPATIENT)
Dept: HEMATOLOGY/ONCOLOGY | Facility: CLINIC | Age: 33
End: 2023-01-24
Payer: COMMERCIAL

## 2023-01-24 ENCOUNTER — TELEPHONE (OUTPATIENT)
Dept: HEMATOLOGY/ONCOLOGY | Facility: CLINIC | Age: 33
End: 2023-01-24
Payer: COMMERCIAL

## 2023-01-24 DIAGNOSIS — D72.820 LYMPHOCYTOSIS: ICD-10-CM

## 2023-01-24 DIAGNOSIS — D75.839 THROMBOCYTOSIS: ICD-10-CM

## 2023-01-24 DIAGNOSIS — E61.1 IRON DEFICIENCY: Primary | ICD-10-CM

## 2023-01-24 RX ORDER — CITALOPRAM 10 MG/1
10 TABLET ORAL DAILY
Qty: 90 TABLET | Refills: 0 | Status: SHIPPED | OUTPATIENT
Start: 2023-01-24 | End: 2024-01-18

## 2023-01-30 ENCOUNTER — LAB VISIT (OUTPATIENT)
Dept: LAB | Facility: HOSPITAL | Age: 33
End: 2023-01-30
Attending: FAMILY MEDICINE
Payer: COMMERCIAL

## 2023-01-30 DIAGNOSIS — E61.1 IRON DEFICIENCY: ICD-10-CM

## 2023-01-30 DIAGNOSIS — D72.820 LYMPHOCYTOSIS: ICD-10-CM

## 2023-01-30 DIAGNOSIS — D75.839 THROMBOCYTOSIS: ICD-10-CM

## 2023-01-30 LAB
ALBUMIN SERPL BCP-MCNC: 3.8 G/DL (ref 3.5–5.2)
ALP SERPL-CCNC: 60 U/L (ref 55–135)
ALT SERPL W/O P-5'-P-CCNC: 14 U/L (ref 10–44)
ANION GAP SERPL CALC-SCNC: 12 MMOL/L (ref 8–16)
AST SERPL-CCNC: 22 U/L (ref 10–40)
BASOPHILS # BLD AUTO: 0.06 K/UL (ref 0–0.2)
BASOPHILS NFR BLD: 0.6 % (ref 0–1.9)
BILIRUB SERPL-MCNC: 0.3 MG/DL (ref 0.1–1)
BUN SERPL-MCNC: 6 MG/DL (ref 6–20)
CALCIUM SERPL-MCNC: 9.1 MG/DL (ref 8.7–10.5)
CHLORIDE SERPL-SCNC: 107 MMOL/L (ref 95–110)
CO2 SERPL-SCNC: 20 MMOL/L (ref 23–29)
CREAT SERPL-MCNC: 0.8 MG/DL (ref 0.5–1.4)
CRP SERPL-MCNC: 10.9 MG/L (ref 0–8.2)
DIFFERENTIAL METHOD: ABNORMAL
EOSINOPHIL # BLD AUTO: 0.1 K/UL (ref 0–0.5)
EOSINOPHIL NFR BLD: 0.9 % (ref 0–8)
ERYTHROCYTE [DISTWIDTH] IN BLOOD BY AUTOMATED COUNT: 18.3 % (ref 11.5–14.5)
ERYTHROCYTE [SEDIMENTATION RATE] IN BLOOD BY WESTERGREN METHOD: 11 MM/HR (ref 0–20)
EST. GFR  (NO RACE VARIABLE): >60 ML/MIN/1.73 M^2
FERRITIN SERPL-MCNC: 12 NG/ML (ref 20–300)
GLUCOSE SERPL-MCNC: 84 MG/DL (ref 70–110)
HCT VFR BLD AUTO: 32.6 % (ref 37–48.5)
HGB BLD-MCNC: 9.5 G/DL (ref 12–16)
IMM GRANULOCYTES # BLD AUTO: 0.02 K/UL (ref 0–0.04)
IMM GRANULOCYTES NFR BLD AUTO: 0.2 % (ref 0–0.5)
LYMPHOCYTES # BLD AUTO: 3.7 K/UL (ref 1–4.8)
LYMPHOCYTES NFR BLD: 39.4 % (ref 18–48)
MCH RBC QN AUTO: 19.6 PG (ref 27–31)
MCHC RBC AUTO-ENTMCNC: 29.1 G/DL (ref 32–36)
MCV RBC AUTO: 67 FL (ref 82–98)
MONOCYTES # BLD AUTO: 0.6 K/UL (ref 0.3–1)
MONOCYTES NFR BLD: 6.6 % (ref 4–15)
NEUTROPHILS # BLD AUTO: 4.9 K/UL (ref 1.8–7.7)
NEUTROPHILS NFR BLD: 52.3 % (ref 38–73)
NRBC BLD-RTO: 0 /100 WBC
PATH REV BLD -IMP: NORMAL
PLATELET # BLD AUTO: 441 K/UL (ref 150–450)
PMV BLD AUTO: 9.6 FL (ref 9.2–12.9)
POTASSIUM SERPL-SCNC: 3.7 MMOL/L (ref 3.5–5.1)
PROT SERPL-MCNC: 7.6 G/DL (ref 6–8.4)
RBC # BLD AUTO: 4.84 M/UL (ref 4–5.4)
SODIUM SERPL-SCNC: 139 MMOL/L (ref 136–145)
WBC # BLD AUTO: 9.34 K/UL (ref 3.9–12.7)

## 2023-01-30 PROCEDURE — 85025 COMPLETE CBC W/AUTO DIFF WBC: CPT | Performed by: NURSE PRACTITIONER

## 2023-01-30 PROCEDURE — 80053 COMPREHEN METABOLIC PANEL: CPT | Performed by: NURSE PRACTITIONER

## 2023-01-30 PROCEDURE — 36415 COLL VENOUS BLD VENIPUNCTURE: CPT | Performed by: NURSE PRACTITIONER

## 2023-01-30 PROCEDURE — 86140 C-REACTIVE PROTEIN: CPT | Performed by: NURSE PRACTITIONER

## 2023-01-30 PROCEDURE — 85060 PATHOLOGIST REVIEW: ICD-10-PCS | Mod: ,,, | Performed by: PATHOLOGY

## 2023-01-30 PROCEDURE — 84466 ASSAY OF TRANSFERRIN: CPT | Performed by: NURSE PRACTITIONER

## 2023-01-30 PROCEDURE — 82728 ASSAY OF FERRITIN: CPT | Performed by: NURSE PRACTITIONER

## 2023-01-30 PROCEDURE — 85060 BLOOD SMEAR INTERPRETATION: CPT | Mod: ,,, | Performed by: PATHOLOGY

## 2023-01-30 PROCEDURE — 85651 RBC SED RATE NONAUTOMATED: CPT | Performed by: NURSE PRACTITIONER

## 2023-01-31 ENCOUNTER — OFFICE VISIT (OUTPATIENT)
Dept: NEUROLOGY | Facility: CLINIC | Age: 33
End: 2023-01-31
Payer: COMMERCIAL

## 2023-01-31 DIAGNOSIS — Z71.89 COUNSELING REGARDING GOALS OF CARE: ICD-10-CM

## 2023-01-31 DIAGNOSIS — Z29.89 PROPHYLACTIC IMMUNOTHERAPY: ICD-10-CM

## 2023-01-31 DIAGNOSIS — G35 MULTIPLE SCLEROSIS: Primary | ICD-10-CM

## 2023-01-31 LAB
IRON SERPL-MCNC: 19 UG/DL (ref 30–160)
PATH REV BLD -IMP: NORMAL
SATURATED IRON: 4 % (ref 20–50)
TOTAL IRON BINDING CAPACITY: 472 UG/DL (ref 250–450)
TRANSFERRIN SERPL-MCNC: 319 MG/DL (ref 200–375)

## 2023-01-31 PROCEDURE — 3044F PR MOST RECENT HEMOGLOBIN A1C LEVEL <7.0%: ICD-10-PCS | Mod: CPTII,95,, | Performed by: CLINICAL NURSE SPECIALIST

## 2023-01-31 PROCEDURE — 3044F HG A1C LEVEL LT 7.0%: CPT | Mod: CPTII,95,, | Performed by: CLINICAL NURSE SPECIALIST

## 2023-01-31 PROCEDURE — 99213 OFFICE O/P EST LOW 20 MIN: CPT | Mod: 95,,, | Performed by: CLINICAL NURSE SPECIALIST

## 2023-01-31 PROCEDURE — 1159F PR MEDICATION LIST DOCUMENTED IN MEDICAL RECORD: ICD-10-PCS | Mod: CPTII,95,, | Performed by: CLINICAL NURSE SPECIALIST

## 2023-01-31 PROCEDURE — 1159F MED LIST DOCD IN RCRD: CPT | Mod: CPTII,95,, | Performed by: CLINICAL NURSE SPECIALIST

## 2023-01-31 PROCEDURE — 99213 PR OFFICE/OUTPT VISIT, EST, LEVL III, 20-29 MIN: ICD-10-PCS | Mod: 95,,, | Performed by: CLINICAL NURSE SPECIALIST

## 2023-01-31 NOTE — Clinical Note
1.) CBC and CMP--late February; late April, late July; add Vit D to February labs  2.) MRI brain late July 3.) F/U with BB after MRI

## 2023-01-31 NOTE — PROGRESS NOTES
Subjective:          Patient ID: Kendy Ryder is a 32 y.o. female who presents today for a routine clinic visit for MS.  She was last seen by Dr. Fall in November 2022. The history has been provided by the patient.     The patient location is: her home   The chief complaint leading to consultation is: MS     Visit type: audiovisual    Face to Face time with patient: 13 minutes   25 minutes of total time spent on the encounter, which includes face to face time and non-face to face time preparing to see the patient (eg, review of tests), Obtaining and/or reviewing separately obtained history, Documenting clinical information in the electronic or other health record, Independently interpreting results (not separately reported) and communicating results to the patient/family/caregiver, or Care coordination (not separately reported).     Each patient to whom he or she provides medical services by telemedicine is:  (1) informed of the relationship between the physician and patient and the respective role of any other health care provider with respect to management of the patient; and (2) notified that he or she may decline to receive medical services by telemedicine and may withdraw from such care at any time.      MS HPI:  DMT: Plegridy IM; started on 1/28/23  Side effects from DMT? No injection site reactions and flu-like  Taking vitamin D3 as recommended? Yes - 10,000 units daily   She has started Metformin for insulin resistance.   She denies any new or different MS symptoms. She has some fatigue, but does not think this is abnormal.   She had the flu in November, but denies any other infections.   She eats vegan and plant-based. She has also been using her Peloton regularly.   She has iron deficiency and will see hematology soon.     Medications:  Current Outpatient Medications   Medication Sig    alpha lipoic acid 100 mg Cap Take by mouth.    biotin 10,000 mcg Cap Take by mouth.    cholecalciferol, vitamin  D3, 125 mcg (5,000 unit) Tab Take 10,000 Units by mouth once daily.    citalopram (CELEXA) 10 MG tablet Take 1 tablet (10 mg total) by mouth once daily.         peginterferon beta-1a (PLEGRIDY) 125 mcg/0.5 mL Syrg Inject Plegridy IM 125mcg every 2 weeks starting at day 29.    peginterferon beta-1a 125 mcg/0.5 mL Syrg Inject 63 mcg into the muscle on day 1 and 94 mcg into the muscle on day 15. Use with provided start .     SOCIAL HISTORY  Social History     Tobacco Use    Smoking status: Never    Smokeless tobacco: Never   Substance Use Topics    Alcohol use: Not Currently    Drug use: No       Living arrangements - the patient lives with their spouse.    ROS:  REVIEW OF SYMPTOMS 1/31/23    Do you feel abnormally tired on most days? No   Do you feel you generally sleep well? Yes   Do you have difficulty controlling your bladder?  No   Do you have difficulty controlling your bowels?  No   Do you have frequent muscle cramps, tightness or spasms in your limbs?  No   Do you have new visual symptoms?  No--wears glasses    Do you have worsening difficulty with your memory or thinking? No   Do you have worsening symptoms of anxiety or depression?  No--social anxiety; start Celexa    For patients who walk, Do you have more difficulty walking?  No   Have you fallen since your last visit?  No   For patients who use wheelchairs: Do you have any skin wounds or breakdown? No   Do you have difficulty using your hands?  No   Do you have shooting or burning pain? No   Do you have difficulty with sexual function?  No   If you are sexually active, are you using birth control? Y/N  N/A No   Do you often choke when swallowing liquids or solid food?  No   Do you experience worsening symptoms when overheated? Yes--makes her feel more fatigued    Do you need any new equipment such as a wheelchair, walker or shower chair? No   Do you receive co-pay financial assistance for your principal MS medicine? Yes   Would you be interested in  participating in an MS research trial in the future? Yes   For patients on Gilenya, Tecfidera, Aubagio, Rituxan, Ocrevus, Tysabri, Lemtrada or Methotrexate, are you aware that you should NOT receive live virus vaccines?  Not Applicable   Do you feel you have adequate family/friend support?  Yes   Do you have health insurance?   Yes   Are you currently employed? Yes   Do you receive SSDI/SSI?  Not Applicable   Do you use marijuana or cannabis products? No   How often? -   Have you been diagnosed with a urinary tract infection since your last visit here? No   Have you been diagnosed with a respiratory tract infection since your last visit here? No   Have you been to the emergency room since your last visit here? No   Have you been hospitalized since your last visit here?  No                Objective:        1. 25 foot timed walk:  Timed 25 Foot Walk: 8/12/2021 5/25/2022   Did patient wear an AFO? No No   Was assistive device used? No No   Time for 25 Foot Walk (seconds) 3.8 3.9   Time for 25 Foot Walk (seconds) 3.8 -       Neurologic Exam    Deferred     Imaging:     Results for orders placed during the hospital encounter of 07/24/21    MRI Brain Demyelinating Without Contrast    Impression  Findings in the cerebral white matter which are typical for multiple sclerosis.  New left occipital white matter plaque without restricted diffusion to suggest active demyelinization..      Electronically signed by: Taqueria Flores MD  Date:    07/25/2021  Time:    16:23      Results for orders placed during the hospital encounter of 05/12/22    MRI Brain Demyelinating W W/O Contrast    Impression  Findings in the cerebral white matter which are typical for multiple sclerosis.    Acute plaque noted in the pericallosal white matter bilaterally likely representing active disease.    Overall however disease burden is similar to prior.      Electronically signed by: Theo Naranjo  Date:    05/12/2022  Time:    20:16    Results for  orders placed during the hospital encounter of 01/18/19    MRI Cervical Spine Demyelinating W W/O Contrast    Impression  Unremarkable motion limited MRI of the cervical spine as detailed above      Electronically signed by: Naren Santiago DO  Date:    01/18/2019  Time:    12:53      Labs:     Lab Results   Component Value Date    [id number] 88 01/27/2021    [id number] 66 10/02/2019    [id number] 46 02/06/2019         Lab Results   Component Value Date    WBC 9.34 01/30/2023    RBC 4.84 01/30/2023    HGB 9.5 (L) 01/30/2023    HCT 32.6 (L) 01/30/2023    MCV 67 (L) 01/30/2023    MCH 19.6 (L) 01/30/2023    MCHC 29.1 (L) 01/30/2023    RDW 18.3 (H) 01/30/2023     01/30/2023    MPV 9.6 01/30/2023    GRAN 4.9 01/30/2023    GRAN 52.3 01/30/2023    LYMPH 3.7 01/30/2023    LYMPH 39.4 01/30/2023    MONO 0.6 01/30/2023    MONO 6.6 01/30/2023    EOS 0.1 01/30/2023    BASO 0.06 01/30/2023    EOSINOPHIL 0.9 01/30/2023    BASOPHIL 0.6 01/30/2023     Sodium   Date Value Ref Range Status   01/30/2023 139 136 - 145 mmol/L Final     Potassium   Date Value Ref Range Status   01/30/2023 3.7 3.5 - 5.1 mmol/L Final     Chloride   Date Value Ref Range Status   01/30/2023 107 95 - 110 mmol/L Final     CO2   Date Value Ref Range Status   01/30/2023 20 (L) 23 - 29 mmol/L Final     Glucose   Date Value Ref Range Status   01/30/2023 84 70 - 110 mg/dL Final     BUN   Date Value Ref Range Status   01/30/2023 6 6 - 20 mg/dL Final     Creatinine   Date Value Ref Range Status   01/30/2023 0.8 0.5 - 1.4 mg/dL Final     Calcium   Date Value Ref Range Status   01/30/2023 9.1 8.7 - 10.5 mg/dL Final     Total Protein   Date Value Ref Range Status   01/30/2023 7.6 6.0 - 8.4 g/dL Final     Albumin   Date Value Ref Range Status   01/30/2023 3.8 3.5 - 5.2 g/dL Final     Total Bilirubin   Date Value Ref Range Status   01/30/2023 0.3 0.1 - 1.0 mg/dL Final     Comment:     For infants and newborns, interpretation of results should be based  on  gestational age, weight and in agreement with clinical  observations.    Premature Infant recommended reference ranges:  Up to 24 hours.............<8.0 mg/dL  Up to 48 hours............<12.0 mg/dL  3-5 days..................<15.0 mg/dL  6-29 days.................<15.0 mg/dL       Alkaline Phosphatase   Date Value Ref Range Status   01/30/2023 60 55 - 135 U/L Final     AST   Date Value Ref Range Status   01/30/2023 22 10 - 40 U/L Final     ALT   Date Value Ref Range Status   01/30/2023 14 10 - 44 U/L Final     Anion Gap   Date Value Ref Range Status   01/30/2023 12 8 - 16 mmol/L Final     eGFR if    Date Value Ref Range Status   01/16/2019 >60 >60 mL/min/1.73 m^2 Final     eGFR if non    Date Value Ref Range Status   01/16/2019 >60 >60 mL/min/1.73 m^2 Final     Comment:     Calculation used to obtain the estimated glomerular filtration  rate (eGFR) is the CKD-EPI equation.        Lab Results   Component Value Date    HEPBSAG Negative 06/17/2022    HEPBSAB Positive 06/17/2022    HEPBCAB Negative 06/17/2022           MS Impression and Plan:     NEURO MULTIPLE SCLEROSIS IMPRESSION:   MS Status:     Number of relapses in the past year?:  0    Clinical Progression:  Clinically Stable  Plan:     DMT:  No change in management    DMT comment:  Continue Plegridy and Vitamin D. We will check CBC and CMP in 1 month, 3 months, and 6 months, then every 6 months thereafter while on Plegridy. She tolerated first injection well.     Symptom Management:  No change in symptom management       MRI brain is planned for 6 months post Plegridy start.  She will follow up with Dr. Fall after the MRI.         LUIS ENRIQUE Hernandez, CNS    Problem List Items Addressed This Visit          Neurologic Problems    Multiple sclerosis - Primary    Relevant Orders    Comprehensive Metabolic Panel    CBC auto differential    MRI Brain Demyelinating W W/O Contrast    Vitamin D       Other    Counseling regarding goals  of care    Prophylactic immunotherapy

## 2023-02-03 ENCOUNTER — PATIENT MESSAGE (OUTPATIENT)
Dept: NEUROLOGY | Facility: CLINIC | Age: 33
End: 2023-02-03
Payer: COMMERCIAL

## 2023-02-03 ENCOUNTER — TELEPHONE (OUTPATIENT)
Dept: NEUROLOGY | Facility: CLINIC | Age: 33
End: 2023-02-03
Payer: COMMERCIAL

## 2023-02-03 NOTE — TELEPHONE ENCOUNTER
----- Message from Steffi Louis, APRN, CNS sent at 1/31/2023  1:28 PM CST -----  1.) CBC and CMP--late February; late April, late July; add Vit D to February labs   2.) MRI brain late July  3.) F/U with BB after MRI

## 2023-02-03 NOTE — TELEPHONE ENCOUNTER
LVM telling pt to call us back to schedule CBC, CMP, and Vit D labs at end of Feb. Also CBC and CMP labs at the end of Apr and end of July. I put a recall in for pt's appt with BB after MRI in July. I will send pt a message through the portal.

## 2023-02-05 DIAGNOSIS — G35 MULTIPLE SCLEROSIS: Primary | ICD-10-CM

## 2023-02-06 RX ORDER — PEGINTERFERON BETA-1A 125 UG/.5ML
125 INJECTION, SOLUTION INTRAMUSCULAR
Qty: 1 ML | Refills: 3 | Status: SHIPPED | OUTPATIENT
Start: 2023-02-06 | End: 2023-05-08

## 2023-02-08 ENCOUNTER — OFFICE VISIT (OUTPATIENT)
Dept: HEMATOLOGY/ONCOLOGY | Facility: CLINIC | Age: 33
End: 2023-02-08
Payer: COMMERCIAL

## 2023-02-08 VITALS
HEIGHT: 67 IN | BODY MASS INDEX: 45.99 KG/M2 | SYSTOLIC BLOOD PRESSURE: 165 MMHG | WEIGHT: 293 LBS | OXYGEN SATURATION: 98 % | DIASTOLIC BLOOD PRESSURE: 100 MMHG | HEART RATE: 98 BPM | TEMPERATURE: 98 F | RESPIRATION RATE: 18 BRPM

## 2023-02-08 DIAGNOSIS — E61.1 IRON DEFICIENCY: Primary | ICD-10-CM

## 2023-02-08 DIAGNOSIS — I10 HYPERTENSION, UNSPECIFIED TYPE: ICD-10-CM

## 2023-02-08 DIAGNOSIS — D75.839 THROMBOCYTOSIS: ICD-10-CM

## 2023-02-08 DIAGNOSIS — D64.9 ANEMIA, UNSPECIFIED TYPE: ICD-10-CM

## 2023-02-08 DIAGNOSIS — D50.0 IRON DEFICIENCY ANEMIA DUE TO CHRONIC BLOOD LOSS: ICD-10-CM

## 2023-02-08 PROCEDURE — 1159F PR MEDICATION LIST DOCUMENTED IN MEDICAL RECORD: ICD-10-PCS | Mod: CPTII,S$GLB,, | Performed by: INTERNAL MEDICINE

## 2023-02-08 PROCEDURE — 99204 PR OFFICE/OUTPT VISIT, NEW, LEVL IV, 45-59 MIN: ICD-10-PCS | Mod: S$GLB,,, | Performed by: INTERNAL MEDICINE

## 2023-02-08 PROCEDURE — 99999 PR PBB SHADOW E&M-EST. PATIENT-LVL IV: CPT | Mod: PBBFAC,,, | Performed by: INTERNAL MEDICINE

## 2023-02-08 PROCEDURE — 3077F PR MOST RECENT SYSTOLIC BLOOD PRESSURE >= 140 MM HG: ICD-10-PCS | Mod: CPTII,S$GLB,, | Performed by: INTERNAL MEDICINE

## 2023-02-08 PROCEDURE — 3077F SYST BP >= 140 MM HG: CPT | Mod: CPTII,S$GLB,, | Performed by: INTERNAL MEDICINE

## 2023-02-08 PROCEDURE — 99999 PR PBB SHADOW E&M-EST. PATIENT-LVL IV: ICD-10-PCS | Mod: PBBFAC,,, | Performed by: INTERNAL MEDICINE

## 2023-02-08 PROCEDURE — 3044F HG A1C LEVEL LT 7.0%: CPT | Mod: CPTII,S$GLB,, | Performed by: INTERNAL MEDICINE

## 2023-02-08 PROCEDURE — 3008F PR BODY MASS INDEX (BMI) DOCUMENTED: ICD-10-PCS | Mod: CPTII,S$GLB,, | Performed by: INTERNAL MEDICINE

## 2023-02-08 PROCEDURE — 3044F PR MOST RECENT HEMOGLOBIN A1C LEVEL <7.0%: ICD-10-PCS | Mod: CPTII,S$GLB,, | Performed by: INTERNAL MEDICINE

## 2023-02-08 PROCEDURE — 1159F MED LIST DOCD IN RCRD: CPT | Mod: CPTII,S$GLB,, | Performed by: INTERNAL MEDICINE

## 2023-02-08 PROCEDURE — 3080F PR MOST RECENT DIASTOLIC BLOOD PRESSURE >= 90 MM HG: ICD-10-PCS | Mod: CPTII,S$GLB,, | Performed by: INTERNAL MEDICINE

## 2023-02-08 PROCEDURE — 99204 OFFICE O/P NEW MOD 45 MIN: CPT | Mod: S$GLB,,, | Performed by: INTERNAL MEDICINE

## 2023-02-08 PROCEDURE — 3008F BODY MASS INDEX DOCD: CPT | Mod: CPTII,S$GLB,, | Performed by: INTERNAL MEDICINE

## 2023-02-08 PROCEDURE — 3080F DIAST BP >= 90 MM HG: CPT | Mod: CPTII,S$GLB,, | Performed by: INTERNAL MEDICINE

## 2023-02-08 RX ORDER — SODIUM CHLORIDE 0.9 % (FLUSH) 0.9 %
10 SYRINGE (ML) INJECTION
Status: CANCELLED | OUTPATIENT
Start: 2023-02-08

## 2023-02-08 RX ORDER — METFORMIN HYDROCHLORIDE 500 MG/1
500 TABLET, EXTENDED RELEASE ORAL 3 TIMES DAILY
COMMUNITY
Start: 2023-02-07

## 2023-02-08 RX ORDER — HEPARIN 100 UNIT/ML
500 SYRINGE INTRAVENOUS
Status: CANCELLED | OUTPATIENT
Start: 2023-02-08

## 2023-02-08 NOTE — PROGRESS NOTES
Subjective:      DATE OF VISIT: 2/8/23     ?  Patient ID:?Kendy Ryder is a 32 y.o. female.?? MR#: [id number]   ?   REFERRING PROVIDER: Althea Luo MD  [id number] [facility] [place] [id number]     ? Primary Care Providers:  Althea Luo MD, MD (General)     CHIEF COMPLAINT: ?Iron deficiency anemia??   ?   HPI    I had the pleasure meeting for the 1st time Ms. Ryder a 32 y.o. woman presenting for further evaluation of anemia.   Further lab results reveal severe iron deficiency.    Her history is notable for MS.  She has menstrual cycles monthly moderate lasting couple days.  She denies any other overt evidence of bleeding from bowel bladder or otherwise.  No family history of colorectal malignancy.  No prior bowel surgery.      Review of Systems    ?   A comprehensive 14-point review of systems was reviewed with patient and was negative other than as specified above.   ?   PAST MEDICAL HISTORY:   Past Medical History:   Diagnosis Date    Anemia     Multiple sclerosis     ?     PAST SURGICAL HISTORY:   Past Surgical History:   Procedure Laterality Date    DILATION AND CURETTAGE OF UTERUS        ?   ALLERGIES:   Allergies as of 02/08/2023    (No Known Allergies)      ?   MEDICATIONS:?   Outpatient Medications Marked as Taking for the 2/8/23 encounter (Office Visit) with Margaret Sorto MD   Medication Sig Dispense Refill    alpha lipoic acid 100 mg Cap Take by mouth.      biotin 10,000 mcg Cap Take by mouth.      cholecalciferol, vitamin D3, 125 mcg (5,000 unit) Tab Take 10,000 Units by mouth once daily.      citalopram (CELEXA) 10 MG tablet Take 1 tablet (10 mg total) by mouth once daily. 90 tablet 0    metFORMIN (GLUCOPHAGE-XR) 500 MG ER 24hr tablet Take 500 mg by mouth 3 (three) times daily.      peginterferon beta-1a (PLEGRIDY) 125 mcg/0.5 mL Syrg Inject 0.5 mLs (125 mcg total) into the muscle every 14 (fourteen) days. 1 mL 3      ?   SOCIAL HISTORY:?   Social History      Tobacco Use    Smoking status: Never    Smokeless tobacco: Never   Substance Use Topics    Alcohol use: Not Currently      ?      ?   FAMILY HISTORY:   family history includes Cancer in her maternal grandmother; Hypertension in her father; No Known Problems in her mother; Stroke in her maternal uncle.   ?        Objective:      Physical Exam      ?   Vitals:    02/08/23 1419   BP: (!) 165/100   Pulse: 98   Resp:    Temp:       ?   ECOG:?0   General appearance: Generally well appearing, in no acute distress.   Head, eyes, ears, nose, and throat: moist mucous membranes.   Respiratory:  Normal work of breathing  Abdomen: nontender, nondistended.   Extremities: Warm, without edema.   Neurologic: Alert and oriented. Grossly normal strength, coordination, and gait.   Skin: No rashes, ecchymoses or petechial lesion.   Psychiatric:  Normal mood and affect.    ?   Laboratory:  ?   No visits with results within 1 Day(s) from this visit.   Latest known visit with results is:   Lab Visit on 01/30/2023   Component Date Value Ref Range Status    WBC 01/30/2023 9.34  3.90 - 12.70 K/uL Final    RBC 01/30/2023 4.84  4.00 - 5.40 M/uL Final    Hemoglobin 01/30/2023 9.5 (L)  12.0 - 16.0 g/dL Final    Hematocrit 01/30/2023 32.6 (L)  37.0 - 48.5 % Final    MCV 01/30/2023 67 (L)  82 - 98 fL Final    MCH 01/30/2023 19.6 (L)  27.0 - 31.0 pg Final    MCHC 01/30/2023 29.1 (L)  32.0 - 36.0 g/dL Final    RDW 01/30/2023 18.3 (H)  11.5 - 14.5 % Final    Platelets 01/30/2023 441  150 - 450 K/uL Final    MPV 01/30/2023 9.6  9.2 - 12.9 fL Final    Immature Granulocytes 01/30/2023 0.2  0.0 - 0.5 % Final    Gran # (ANC) 01/30/2023 4.9  1.8 - 7.7 K/uL Final    Immature Grans (Abs) 01/30/2023 0.02  0.00 - 0.04 K/uL Final    Lymph # 01/30/2023 3.7  1.0 - 4.8 K/uL Final    Mono # 01/30/2023 0.6  0.3 - 1.0 K/uL Final    Eos # 01/30/2023 0.1  0.0 - 0.5 K/uL Final    Baso # 01/30/2023 0.06  0.00 - 0.20 K/uL Final    nRBC 01/30/2023 0   0 /100 WBC Final    Gran % 01/30/2023 52.3  38.0 - 73.0 % Final    Lymph % 01/30/2023 39.4  18.0 - 48.0 % Final    Mono % 01/30/2023 6.6  4.0 - 15.0 % Final    Eosinophil % 01/30/2023 0.9  0.0 - 8.0 % Final    Basophil % 01/30/2023 0.6  0.0 - 1.9 % Final    Differential Method 01/30/2023 Automated   Final    Sodium 01/30/2023 139  136 - 145 mmol/L Final    Potassium 01/30/2023 3.7  3.5 - 5.1 mmol/L Final    Chloride 01/30/2023 107  95 - 110 mmol/L Final    CO2 01/30/2023 20 (L)  23 - 29 mmol/L Final    Glucose 01/30/2023 84  70 - 110 mg/dL Final    BUN 01/30/2023 6  6 - 20 mg/dL Final    Creatinine 01/30/2023 0.8  0.5 - 1.4 mg/dL Final    Calcium 01/30/2023 9.1  8.7 - 10.5 mg/dL Final    Total Protein 01/30/2023 7.6  6.0 - 8.4 g/dL Final    Albumin 01/30/2023 3.8  3.5 - 5.2 g/dL Final    Total Bilirubin 01/30/2023 0.3  0.1 - 1.0 mg/dL Final    Alkaline Phosphatase 01/30/2023 60  55 - 135 U/L Final    AST 01/30/2023 22  10 - 40 U/L Final    ALT 01/30/2023 14  10 - 44 U/L Final    Anion Gap 01/30/2023 12  8 - 16 mmol/L Final    eGFR 01/30/2023 >60  >60 mL/min/1.73 m^2 Final    Iron 01/30/2023 19 (L)  30 - 160 ug/dL Final    Transferrin 01/30/2023 319  200 - 375 mg/dL Final    TIBC 01/30/2023 472 (H)  250 - 450 ug/dL Final    Saturated Iron 01/30/2023 4 (L)  20 - 50 % Final    Ferritin 01/30/2023 12 (L)  20.0 - 300.0 ng/mL Final    Pathologist Review 01/30/2023 Review required   Final    Sed Rate 01/30/2023 11  0 - 20 mm/Hr Final    CRP 01/30/2023 10.9 (H)  0.0 - 8.2 mg/L Final    Pathologist Review Peripheral Smear 01/30/2023 REVIEWED   Final          ?   Assessment/Plan:   Iron deficiency  -     Ambulatory referral/consult to Endo Procedure ; Future; Expected date: 02/09/2023  -     CBC Auto Differential; Future; Expected date: 05/08/2023  -     Ferritin; Future; Expected date: 05/08/2023  -     Iron and TIBC; Future; Expected date: 05/08/2023  -     Iron and TIBC; Future;  Expected date: 05/08/2023  -     Ferritin; Future; Expected date: 05/08/2023  -     CBC Auto Differential; Future; Expected date: 05/08/2023    Anemia, unspecified type  -     Ambulatory referral/consult to Hematology / Oncology    Thrombocytosis    Hypertension, unspecified type       1. Iron deficiency    2. Anemia, unspecified type    3. Thrombocytosis    4. Hypertension, unspecified type            Plan:     # iron deficiency anemia:  Iron deficiency anemia with hemoglobin 9.5, 12% saturation, ferritin 4.  Moderate menstrual cycles without clear menorrhagia or other evidence of bleeding.  I have recommended full GI evaluation with EGD and colonoscopy, order placed.  Given severity of iron deficiency anemia we discussed risks and benefits of IV iron therapy.   We will arrange for IV iron therapy with follow-up in 3 months to assess for response.      # hypertension:  Some anxiety associated [facility].  Recommended importance of following with primary care for optimal control, especially prior to infusion IV iron.      Follow-Up:     Route Chart for Scheduling    Med Onc Chart Routing      Follow up with physician    Follow up with GORDO 3 months.   Infusion scheduling note iv iron x 2 doses 1 wk apart ferahene   Injection scheduling note    Labs Ferritin, iron and TIBC and CBC   Lab interval:  in 3 mo couple days prior to NP visit   Imaging    Pharmacy appointment    Other referrals          Therapy Plan Information  OCRELIZUMAB (OCREVUS) FOR MS  Pre-Medications  acetaminophen tablet 1,000 mg  1,000 mg, Oral, Every visit  diphenhydramine (BENADRYL) 50 mg in NS 50 mL IVPB  50 mg, Intravenous, Every visit  famotidine (PF) injection 20 mg  20 mg, Intravenous, Every visit  methylPREDNISolone sodium succinate (SOLU-MEDROL) 100 mg in dextrose 5 % 100 mL IVPB  100 mg, Intravenous, Every visit  Medications  ocrelizumab 300 mg in sodium chloride 0.9% 260 mL IVPB  300 mg, Intravenous, Every 2  weeks  Flushes  sodium chloride 0.9% flush 10 mL  10 mL, Intravenous, Every visit  heparin, porcine (PF) 100 unit/mL injection flush 500 Units  500 Units, Intravenous, Every visit  alteplase injection 2 mg  2 mg, Intra-Catheter, Every visit  sodium chloride 0.9% 250 mL flush bag  Intravenous, Every visit  PRN Medications  EPINEPHrine (EPIPEN) 0.3 mg/0.3 mL pen injection 0.3 mg  0.3 mg, Intramuscular, Every visit  diphenhydrAMINE injection 50 mg  50 mg, Intravenous, Every visit  hydrocortisone sodium succinate injection 100 mg  100 mg, Intravenous, Every visit    FERAHEME (FERUMOXYTOL) TWO DOSES  Medications  ferumoxytoL (FERAHEME) 510 mg in dextrose 5 % (D5W) 100 mL IVPB  510 mg, Intravenous, Every visit  Flushes  heparin, porcine (PF) 100 unit/mL injection flush 500 Units  500 Units, Intravenous, PRN  sodium chloride 0.9% flush 10 mL  10 mL, Intravenous, Every visit  sodium chloride 0.9% 100 mL flush bag  Intravenous, Every visit

## 2023-02-14 ENCOUNTER — PATIENT MESSAGE (OUTPATIENT)
Dept: INTERNAL MEDICINE | Facility: CLINIC | Age: 33
End: 2023-02-14
Payer: COMMERCIAL

## 2023-02-14 DIAGNOSIS — E88.819 INSULIN RESISTANCE: Primary | ICD-10-CM

## 2023-02-16 ENCOUNTER — HOSPITAL ENCOUNTER (OUTPATIENT)
Dept: PREADMISSION TESTING | Facility: HOSPITAL | Age: 33
Discharge: HOME | End: 2023-02-16
Attending: INTERNAL MEDICINE
Payer: COMMERCIAL

## 2023-02-16 ENCOUNTER — PATIENT MESSAGE (OUTPATIENT)
Dept: INTERNAL MEDICINE | Facility: CLINIC | Age: 33
End: 2023-02-16
Payer: COMMERCIAL

## 2023-02-16 DIAGNOSIS — E61.1 IRON DEFICIENCY: Primary | ICD-10-CM

## 2023-02-16 RX ORDER — POLYETHYLENE GLYCOL 3350, SODIUM SULFATE ANHYDROUS, SODIUM BICARBONATE, SODIUM CHLORIDE, POTASSIUM CHLORIDE 236; 22.74; 6.74; 5.86; 2.97 G/4L; G/4L; G/4L; G/4L; G/4L
4 POWDER, FOR SOLUTION ORAL ONCE
Qty: 4000 ML | Refills: 0 | Status: SHIPPED | OUTPATIENT
Start: 2023-02-16 | End: 2023-02-16

## 2023-02-16 RX ORDER — SEMAGLUTIDE 1.34 MG/ML
0.25 INJECTION, SOLUTION SUBCUTANEOUS
Qty: 1 PEN | Refills: 1 | Status: SHIPPED | OUTPATIENT
Start: 2023-02-16 | End: 2023-04-11 | Stop reason: SDUPTHER

## 2023-02-20 ENCOUNTER — INFUSION (OUTPATIENT)
Dept: INFUSION THERAPY | Facility: HOSPITAL | Age: 33
End: 2023-02-20
Attending: INTERNAL MEDICINE
Payer: COMMERCIAL

## 2023-02-20 ENCOUNTER — PATIENT MESSAGE (OUTPATIENT)
Dept: PSYCHIATRY | Facility: CLINIC | Age: 33
End: 2023-02-20
Payer: COMMERCIAL

## 2023-02-20 VITALS
SYSTOLIC BLOOD PRESSURE: 153 MMHG | OXYGEN SATURATION: 99 % | DIASTOLIC BLOOD PRESSURE: 84 MMHG | TEMPERATURE: 98 F | HEART RATE: 87 BPM

## 2023-02-20 DIAGNOSIS — D50.0 IRON DEFICIENCY ANEMIA DUE TO CHRONIC BLOOD LOSS: Primary | ICD-10-CM

## 2023-02-20 PROCEDURE — 63600175 PHARM REV CODE 636 W HCPCS: Mod: JG | Performed by: INTERNAL MEDICINE

## 2023-02-20 PROCEDURE — 25000003 PHARM REV CODE 250: Performed by: INTERNAL MEDICINE

## 2023-02-20 PROCEDURE — 96365 THER/PROPH/DIAG IV INF INIT: CPT

## 2023-02-20 RX ORDER — SODIUM CHLORIDE 0.9 % (FLUSH) 0.9 %
10 SYRINGE (ML) INJECTION
Status: CANCELLED | OUTPATIENT
Start: 2023-02-20

## 2023-02-20 RX ORDER — HEPARIN 100 UNIT/ML
500 SYRINGE INTRAVENOUS
Status: CANCELLED | OUTPATIENT
Start: 2023-02-20

## 2023-02-20 RX ADMIN — SODIUM CHLORIDE: 9 INJECTION, SOLUTION INTRAVENOUS at 02:02

## 2023-02-20 RX ADMIN — FERUMOXYTOL 510 MG: 510 INJECTION INTRAVENOUS at 02:02

## 2023-02-20 NOTE — PLAN OF CARE
Discussed plan of care with pt. Addressed any and ongoing concerns. Pt denies   Problem: Adult Inpatient Plan of Care  Goal: Plan of Care Review  Outcome: Ongoing, Progressing  Goal: Patient-Specific Goal (Individualized)  Outcome: Ongoing, Progressing  Flowsheets (Taken 2/20/2023 1438)  Anxieties, Fears or Concerns: none expressed  Individualized Care Needs: In recliner with feet elevated, prefers IV in right arm  Goal: Absence of Hospital-Acquired Illness or Injury  Outcome: Ongoing, Progressing  Intervention: Prevent Infection  Flowsheets (Taken 2/20/2023 1438)  Infection Prevention:   equipment surfaces disinfected   hand hygiene promoted   personal protective equipment utilized  Goal: Optimal Comfort and Wellbeing  Outcome: Ongoing, Progressing  Intervention: Provide Person-Centered Care  Flowsheets (Taken 2/20/2023 1438)  Trust Relationship/Rapport:   emotional support provided   empathic listening provided   questions answered   care explained   reassurance provided   thoughts/feelings acknowledged   questions encouraged

## 2023-02-27 ENCOUNTER — INFUSION (OUTPATIENT)
Dept: INFUSION THERAPY | Facility: HOSPITAL | Age: 33
End: 2023-02-27
Attending: INTERNAL MEDICINE
Payer: COMMERCIAL

## 2023-02-27 VITALS
SYSTOLIC BLOOD PRESSURE: 158 MMHG | TEMPERATURE: 98 F | DIASTOLIC BLOOD PRESSURE: 97 MMHG | OXYGEN SATURATION: 98 % | HEART RATE: 77 BPM | RESPIRATION RATE: 18 BRPM

## 2023-02-27 DIAGNOSIS — D50.0 IRON DEFICIENCY ANEMIA DUE TO CHRONIC BLOOD LOSS: Primary | ICD-10-CM

## 2023-02-27 PROCEDURE — 96365 THER/PROPH/DIAG IV INF INIT: CPT

## 2023-02-27 PROCEDURE — 25000003 PHARM REV CODE 250: Performed by: INTERNAL MEDICINE

## 2023-02-27 PROCEDURE — 63600175 PHARM REV CODE 636 W HCPCS: Mod: JG | Performed by: INTERNAL MEDICINE

## 2023-02-27 RX ORDER — HEPARIN 100 UNIT/ML
500 SYRINGE INTRAVENOUS
OUTPATIENT
Start: 2023-02-27

## 2023-02-27 RX ORDER — SODIUM CHLORIDE 0.9 % (FLUSH) 0.9 %
10 SYRINGE (ML) INJECTION
OUTPATIENT
Start: 2023-02-27

## 2023-02-27 RX ADMIN — FERUMOXYTOL 510 MG: 510 INJECTION INTRAVENOUS at 08:02

## 2023-02-27 NOTE — PLAN OF CARE
Problem: Adult Inpatient Plan of Care  Goal: Plan of Care Review  Outcome: Ongoing, Progressing  Goal: Patient-Specific Goal (Individualized)  Outcome: Ongoing, Progressing  Goal: Optimal Comfort and Wellbeing  Outcome: Ongoing, Progressing  Intervention: Provide Person-Centered Care  Flowsheets (Taken 2/27/2023 0808)  Trust Relationship/Rapport:   reassurance provided   care explained   choices provided   thoughts/feelings acknowledged   emotional support provided   empathic listening provided   questions answered   questions encouraged

## 2023-03-03 ENCOUNTER — PATIENT MESSAGE (OUTPATIENT)
Dept: NEUROLOGY | Facility: CLINIC | Age: 33
End: 2023-03-03
Payer: COMMERCIAL

## 2023-03-21 ENCOUNTER — ANESTHESIA (OUTPATIENT)
Dept: ENDOSCOPY | Facility: HOSPITAL | Age: 33
End: 2023-03-21
Payer: COMMERCIAL

## 2023-03-21 ENCOUNTER — HOSPITAL ENCOUNTER (OUTPATIENT)
Facility: HOSPITAL | Age: 33
Discharge: HOME | End: 2023-03-21
Attending: INTERNAL MEDICINE | Admitting: INTERNAL MEDICINE
Payer: COMMERCIAL

## 2023-03-21 ENCOUNTER — ANESTHESIA EVENT (OUTPATIENT)
Dept: ENDOSCOPY | Facility: HOSPITAL | Age: 33
End: 2023-03-21
Payer: COMMERCIAL

## 2023-03-21 DIAGNOSIS — D50.0 IRON DEFICIENCY ANEMIA DUE TO CHRONIC BLOOD LOSS: Primary | ICD-10-CM

## 2023-03-21 LAB
B-HCG UR QL: NEGATIVE
CTP QC/QA: YES

## 2023-03-21 PROCEDURE — 25000003 PHARM REV CODE 250: Performed by: NURSE ANESTHETIST, CERTIFIED REGISTERED

## 2023-03-21 PROCEDURE — 63600175 PHARM REV CODE 636 W HCPCS: Performed by: NURSE ANESTHETIST, CERTIFIED REGISTERED

## 2023-03-21 PROCEDURE — 45378 PR COLONOSCOPY,DIAGNOSTIC: ICD-10-PCS | Mod: ,,, | Performed by: INTERNAL MEDICINE

## 2023-03-21 PROCEDURE — 43239 PR EGD, FLEX, W/BIOPSY, SGL/MULTI: ICD-10-PCS | Mod: 51,,, | Performed by: INTERNAL MEDICINE

## 2023-03-21 PROCEDURE — 25000003 PHARM REV CODE 250: Performed by: INTERNAL MEDICINE

## 2023-03-21 PROCEDURE — 88305 TISSUE EXAM BY PATHOLOGIST: ICD-10-PCS | Mod: 26,,, | Performed by: PATHOLOGY

## 2023-03-21 PROCEDURE — 88342 CHG IMMUNOCYTOCHEMISTRY: ICD-10-PCS | Mod: 26,,, | Performed by: PATHOLOGY

## 2023-03-21 PROCEDURE — 37000009 HC ANESTHESIA EA ADD 15 MINS: Performed by: INTERNAL MEDICINE

## 2023-03-21 PROCEDURE — 45378 DIAGNOSTIC COLONOSCOPY: CPT | Mod: ,,, | Performed by: INTERNAL MEDICINE

## 2023-03-21 PROCEDURE — 45378 DIAGNOSTIC COLONOSCOPY: CPT | Performed by: INTERNAL MEDICINE

## 2023-03-21 PROCEDURE — 88342 IMHCHEM/IMCYTCHM 1ST ANTB: CPT | Mod: 26,,, | Performed by: PATHOLOGY

## 2023-03-21 PROCEDURE — 88305 TISSUE EXAM BY PATHOLOGIST: CPT | Mod: 26,,, | Performed by: PATHOLOGY

## 2023-03-21 PROCEDURE — 81025 URINE PREGNANCY TEST: CPT | Performed by: INTERNAL MEDICINE

## 2023-03-21 PROCEDURE — 27201012 HC FORCEPS, HOT/COLD, DISP: Performed by: INTERNAL MEDICINE

## 2023-03-21 PROCEDURE — 43239 EGD BIOPSY SINGLE/MULTIPLE: CPT | Mod: 51,,, | Performed by: INTERNAL MEDICINE

## 2023-03-21 PROCEDURE — 37000008 HC ANESTHESIA 1ST 15 MINUTES: Performed by: INTERNAL MEDICINE

## 2023-03-21 PROCEDURE — 88305 TISSUE EXAM BY PATHOLOGIST: CPT | Performed by: PATHOLOGY

## 2023-03-21 PROCEDURE — 43239 EGD BIOPSY SINGLE/MULTIPLE: CPT | Performed by: INTERNAL MEDICINE

## 2023-03-21 RX ORDER — LIDOCAINE HYDROCHLORIDE 20 MG/ML
INJECTION, SOLUTION EPIDURAL; INFILTRATION; INTRACAUDAL; PERINEURAL
Status: DISCONTINUED | OUTPATIENT
Start: 2023-03-21 | End: 2023-03-21

## 2023-03-21 RX ORDER — PROPOFOL 10 MG/ML
VIAL (ML) INTRAVENOUS
Status: DISCONTINUED | OUTPATIENT
Start: 2023-03-21 | End: 2023-03-21

## 2023-03-21 RX ORDER — DEXTROMETHORPHAN/PSEUDOEPHED 2.5-7.5/.8
DROPS ORAL
Status: DISCONTINUED | OUTPATIENT
Start: 2023-03-21 | End: 2023-03-21 | Stop reason: HOSPADM

## 2023-03-21 RX ADMIN — PROPOFOL 50 MG: 10 INJECTION, EMULSION INTRAVENOUS at 08:03

## 2023-03-21 RX ADMIN — PROPOFOL 150 MG: 10 INJECTION, EMULSION INTRAVENOUS at 08:03

## 2023-03-21 RX ADMIN — LIDOCAINE HYDROCHLORIDE 100 MG: 20 INJECTION, SOLUTION EPIDURAL; INFILTRATION; INTRACAUDAL; PERINEURAL at 08:03

## 2023-03-21 RX ADMIN — SODIUM CHLORIDE, POTASSIUM CHLORIDE, SODIUM LACTATE AND CALCIUM CHLORIDE: 600; 310; 30; 20 INJECTION, SOLUTION INTRAVENOUS at 08:03

## 2023-03-21 NOTE — ANESTHESIA POSTPROCEDURE EVALUATION
Anesthesia Post Evaluation    Patient: Kendy Ryder    Procedure(s) Performed: Procedure(s) (LRB):  EGD (ESOPHAGOGASTRODUODENOSCOPY) (N/A)  COLONOSCOPY (N/A)    Final Anesthesia Type: MAC      Patient location during evaluation: GI PACU  Patient participation: Yes- Able to Participate  Level of consciousness: awake and alert and awake  Post-procedure vital signs: reviewed and stable  Pain management: adequate  Airway patency: patent  MARY mitigation strategies: Multimodal analgesia  PONV status at discharge: No PONV  Anesthetic complications: no      Cardiovascular status: blood pressure returned to baseline and hemodynamically stable  Respiratory status: unassisted and spontaneous ventilation  Hydration status: euvolemic  Follow-up not needed.          Vitals Value Taken Time   BP  03/21/23 0905   Temp  03/21/23 0905   Pulse  03/21/23 0905   Resp  03/21/23 0905   SpO2  03/21/23 0905         No case tracking events are documented in the log.      Pain/Laurie Score: No data recorded

## 2023-03-21 NOTE — TRANSFER OF CARE
"Anesthesia Transfer of Care Note    Patient: Kendy Ryder    Procedure(s) Performed: Procedure(s) (LRB):  EGD (ESOPHAGOGASTRODUODENOSCOPY) (N/A)  COLONOSCOPY (N/A)    Patient location: GI    Anesthesia Type: MAC    Transport from OR: Transported from OR on room air with adequate spontaneous ventilation    Post pain: adequate analgesia    Post assessment: no apparent anesthetic complications and tolerated procedure well    Post vital signs: stable    Level of consciousness: awake and alert    Nausea/Vomiting: no nausea/vomiting    Complications: none    Transfer of care protocol was followed      Last vitals:   Visit Vitals  /80 (BP Location: Left arm, Patient Position: Lying)   Pulse 89   Temp 36.4 °C (97.5 °F) (Tympanic)   Resp 16   Ht 5' 7" (1.702 m)   Wt (!) 165.6 kg (365 lb)   LMP 03/07/2023   SpO2 100%   Breastfeeding No   BMI 57.17 kg/m²     "

## 2023-03-21 NOTE — PROVATION PATIENT INSTRUCTIONS
Discharge Summary/Instructions after an Endoscopic Procedure  Patient Name: Kendy Ryder  Patient MRN: [id number]  Patient YOB: 1990 Tuesday, March 21, 2023 Radha Mckeon MD  Dear patient,  As a result of recent federal legislation (The Federal Cures Act), you may   receive lab or pathology results from your procedure in your [facility]   account before your physician is able to contact you. Your physician or   their representative will relay the results to you with their   recommendations at their soonest availability.  Thank you,  RESTRICTIONS:  During your procedure today, you received medications for sedation.  These   medications may affect your judgment, balance and coordination.  Therefore,   for 24 hours, you have the following restrictions:   - DO NOT drive a car, operate machinery, make legal/financial decisions,   sign important papers or drink alcohol.    ACTIVITY:  Today: no heavy lifting, straining or running due to procedural   sedation/anesthesia.  The following day: return to full activity including work.  DIET:  Eat and drink normally unless instructed otherwise.     TREATMENT FOR COMMON SIDE EFFECTS:  - Mild abdominal pain, nausea, belching, bloating or excessive gas:  rest,   eat lightly and use a heating pad.  - Sore Throat: treat with throat lozenges and/or gargle with warm salt   water.  - Because air was used during the procedure, expelling large amounts of air   from your rectum or belching is normal.  - If a bowel prep was taken, you may not have a bowel movement for 1-3 days.    This is normal.  SYMPTOMS TO WATCH FOR AND REPORT TO YOUR PHYSICIAN:  1. Abdominal pain or bloating, other than gas cramps.  2. Chest pain.  3. Back pain.  4. Signs of infection such as: chills or fever occurring within 24 hours   after the procedure.  5. Rectal bleeding, which would show as bright red, maroon, or black stools.   (A tablespoon of blood from the rectum is not serious, especially if    hemorrhoids are present.)  6. Vomiting.  7. Weakness or dizziness.  GO DIRECTLY TO THE NEAREST EMERGENCY ROOM IF YOU HAVE ANY OF THE FOLLOWING:      Difficulty breathing              Chills and/or fever over 101 F   Persistent vomiting and/or vomiting blood   Severe abdominal pain   Severe chest pain   Black, tarry stools   Bleeding- more than one tablespoon   Any other symptom or condition that you feel may need urgent attention  Your doctor recommends these additional instructions:  If any biopsies were taken, your doctors clinic will contact you in 1 to 2   weeks with any results.  - Discharge patient to home (with escort).   - Resume previous diet.   - Continue present medications.   - Repeat colonoscopy at age 43 for screening purposes.   - Return to referring physician Dr. Margaret Sorto of hematology,   oncology.  For questions, problems or results please call your physician Radha Mckeon MD at Work:  [phone]  If you have any questions about the above instructions, call the GI   department at [phone] or call the endoscopy unit at [phone]   from 7am until 3 pm.  [facility] [place], EMERGENCY ROOM PHONE NUMBER:   [phone]  IF A COMPLICATION OR EMERGENCY SITUATION ARISES AND YOU ARE UNABLE TO REACH   YOUR PHYSICIAN - GO DIRECTLY TO THE EMERGENCY ROOM.  I have read or have had read to me these discharge instructions for my   procedure and have received a written copy.  I understand these   instructions and will follow-up with my physician if I have any questions.     __________________________________       _____________________________________  Nurse Signature                                          Patient/Designated   Responsible Party Signature  MD Radha Mata MD  3/21/2023 9:15:56 AM  This report has been verified and signed electronically.  Dear patient,  As a result of recent federal legislation (The Federal Cures Act), you may   receive  lab or pathology results from your procedure in your [employer]   account before your physician is able to contact you. Your physician or   their representative will relay the results to you with their   recommendations at their soonest availability.  Thank you,  PROVATION

## 2023-03-21 NOTE — ANESTHESIA PREPROCEDURE EVALUATION
03/21/2023  Kendy Ryder is a 32 y.o., female.      Pre-op Assessment    I have reviewed the Patient Summary Reports.     I have reviewed the Nursing Notes. I have reviewed the NPO Status.      Review of Systems  Hematology/Oncology:         -- Anemia:   Hepatic/GI:   Bowel Prep.    Neurological:  Neuromuscular Disease, Multiple Sclerosis   Endocrine:   Diabetes  Morbid Obesity / BMI > 40      Physical Exam  General: Well nourished, Cooperative and Alert    Airway:  Mallampati: III   Mouth Opening: Normal  Neck ROM: Normal ROM    Dental:  Intact        Anesthesia Plan  Type of Anesthesia, risks & benefits discussed:    Anesthesia Type: MAC  Intra-op Monitoring Plan: Standard ASA Monitors  Induction:  IV  Informed Consent: Informed consent signed with the Patient and all parties understand the risks and agree with anesthesia plan.  All questions answered.   ASA Score: 3  Day of Surgery Review of History & Physical: H&P Update referred to the surgeon/provider.    Ready For Surgery From Anesthesia Perspective.     .

## 2023-03-21 NOTE — PROVATION PATIENT INSTRUCTIONS
Discharge Summary/Instructions after an Endoscopic Procedure  Patient Name: Kendy Ryder  Patient MRN: [id number]  Patient YOB: 1990 Tuesday, March 21, 2023 Radha Mckeon MD  Dear patient,  As a result of recent federal legislation (The Federal Cures Act), you may   receive lab or pathology results from your procedure in your [facility]   account before your physician is able to contact you. Your physician or   their representative will relay the results to you with their   recommendations at their soonest availability.  Thank you,  RESTRICTIONS:  During your procedure today, you received medications for sedation.  These   medications may affect your judgment, balance and coordination.  Therefore,   for 24 hours, you have the following restrictions:   - DO NOT drive a car, operate machinery, make legal/financial decisions,   sign important papers or drink alcohol.    ACTIVITY:  Today: no heavy lifting, straining or running due to procedural   sedation/anesthesia.  The following day: return to full activity including work.  DIET:  Eat and drink normally unless instructed otherwise.     TREATMENT FOR COMMON SIDE EFFECTS:  - Mild abdominal pain, nausea, belching, bloating or excessive gas:  rest,   eat lightly and use a heating pad.  - Sore Throat: treat with throat lozenges and/or gargle with warm salt   water.  - Because air was used during the procedure, expelling large amounts of air   from your rectum or belching is normal.  - If a bowel prep was taken, you may not have a bowel movement for 1-3 days.    This is normal.  SYMPTOMS TO WATCH FOR AND REPORT TO YOUR PHYSICIAN:  1. Abdominal pain or bloating, other than gas cramps.  2. Chest pain.  3. Back pain.  4. Signs of infection such as: chills or fever occurring within 24 hours   after the procedure.  5. Rectal bleeding, which would show as bright red, maroon, or black stools.   (A tablespoon of blood from the rectum is not serious, especially if    hemorrhoids are present.)  6. Vomiting.  7. Weakness or dizziness.  GO DIRECTLY TO THE NEAREST EMERGENCY ROOM IF YOU HAVE ANY OF THE FOLLOWING:      Difficulty breathing              Chills and/or fever over 101 F   Persistent vomiting and/or vomiting blood   Severe abdominal pain   Severe chest pain   Black, tarry stools   Bleeding- more than one tablespoon   Any other symptom or condition that you feel may need urgent attention  Your doctor recommends these additional instructions:  If any biopsies were taken, your doctors clinic will contact you in 1 to 2   weeks with any results.  - Discharge patient to home after colonoscopy.   - Resume previous diet.   - Continue present medications.   - Await pathology results.   - Observe patient's clinical course.   - Proceed with colonoscopy.  For questions, problems or results please call your physician Radha Mckeon MD at Work:  [phone]  If you have any questions about the above instructions, call the GI   department at [phone] or call the endoscopy unit at [phone]   from 7am until 3 pm.  [facility] [place], EMERGENCY ROOM PHONE NUMBER:   [phone]  IF A COMPLICATION OR EMERGENCY SITUATION ARISES AND YOU ARE UNABLE TO REACH   YOUR PHYSICIAN - GO DIRECTLY TO THE EMERGENCY ROOM.  I have read or have had read to me these discharge instructions for my   procedure and have received a written copy.  I understand these   instructions and will follow-up with my physician if I have any questions.     __________________________________       _____________________________________  Nurse Signature                                          Patient/Designated   Responsible Party Signature  MD Radha Mata MD  3/21/2023 9:07:57 AM  This report has been verified and signed electronically.  Dear patient,  As a result of recent federal legislation (The Federal Cures Act), you may   receive lab or pathology results from your  procedure in your [facility]   account before your physician is able to contact you. Your physician or   their representative will relay the results to you with their   recommendations at their soonest availability.  Thank you,  PROVATION

## 2023-03-21 NOTE — H&P
PRE PROCEDURE H&P    Patient Name: Kendy Ryder  MRN: [id number]  : 1990  Date of Procedure:  3/21/2023  Referring Physician: Margaret Sorto MD  Primary Physician: Althea Luo MD  Procedure Physician: Radha Mckeon MD       Planned Procedure: Colonoscopy and EGD  Diagnosis: iron deficiency anemia     Chief Complaint: Same as above    HPI: Patient is an 32 y.o. female is here for the above. Referred by Dr. Sorto for KATHY. Admits to heavy menstrual cycles. No overt GI bleeding. No Fhx of CRC, no blood thinners.  at bedside.    Last colonoscopy: none  Family history: none  Anticoagulation: none    Past Medical History:   Past Medical History:   Diagnosis Date    Anemia     Insulin resistance     Multiple sclerosis         Past Surgical History:  Past Surgical History:   Procedure Laterality Date    DILATION AND CURETTAGE OF UTERUS          Home Medications:  Prior to Admission medications    Medication Sig Start Date End Date Taking? Authorizing Provider   alpha lipoic acid 100 mg Cap Take by mouth.   Yes Historical Provider   biotin 10,000 mcg Cap Take by mouth.   Yes Historical Provider   cholecalciferol, vitamin D3, 125 mcg (5,000 unit) Tab Take 10,000 Units by mouth once daily.   Yes Historical Provider   citalopram (CELEXA) 10 MG tablet Take 1 tablet (10 mg total) by mouth once daily. 23 Yes Althea Luo MD   metFORMIN (GLUCOPHAGE-XR) 500 MG ER 24hr tablet Take 500 mg by mouth 3 (three) times daily. 23  Yes Historical Provider   peginterferon beta-1a (PLEGRIDY) 125 mcg/0.5 mL Syrg Inject 0.5 mLs (125 mcg total) into the muscle every 14 (fourteen) days. 23  Yes LUIS ENRIQUE Otto, CNS   semaglutide (OZEMPIC) 0.25 mg or 0.5 mg(2 mg/1.5 mL) pen injector Inject 0.25 mg into the skin every 7 days. 23 Yes Althea Luo MD        Allergies:  Review of patient's allergies indicates:  No Known Allergies     Social History:   Social History  "    Socioeconomic History    Marital status:    Tobacco Use    Smoking status: Never    Smokeless tobacco: Never   Substance and Sexual Activity    Alcohol use: Not Currently    Drug use: No    Sexual activity: Yes     Partners: Male     Birth control/protection: None     Social Determinants of Health     Financial Resource Strain: Low Risk     Difficulty of Paying Living Expenses: Not hard at all   Food Insecurity: No Food Insecurity    Worried About Running Out of Food in the Last Year: Never true    Ran Out of Food in the Last Year: Never true   Transportation Needs: No Transportation Needs    Lack of Transportation (Medical): No    Lack of Transportation (Non-Medical): No   Physical Activity: Insufficiently Active    Days of Exercise per Week: 3 days    Minutes of Exercise per Session: 30 min   Stress: No Stress Concern Present    Feeling of Stress : Only a little   Social Connections: Unknown    Frequency of Communication with Friends and Family: More than three times a week    Frequency of Social Gatherings with Friends and Family: Once a week    Active Member of Clubs or Organizations: Yes    Attends Club or Organization Meetings: More than 4 times per year    Marital Status:    Housing Stability: Low Risk     Unable to Pay for Housing in the Last Year: No    Number of Places Lived in the Last Year: 1    Unstable Housing in the Last Year: No       Family History:  Family History   Problem Relation Age of Onset    Hypertension Father     No Known Problems Mother     Cancer Maternal Grandmother     Stroke Maternal Uncle        ROS: No acute cardiac events, no acute respiratory complaints.     Physical Exam (all patients):    /80 (BP Location: Left arm, Patient Position: Lying)   Pulse 89   Temp 97.5 °F (36.4 °C) (Tympanic)   Resp 16   Ht 5' 7" (1.702 m)   Wt (!) 165.6 kg (365 lb)   LMP 03/07/2023   SpO2 100%   Breastfeeding No   BMI 57.17 kg/m²   Lungs: Clear to auscultation " bilaterally, respirations unlabored  Heart: Regular rate and rhythm, S1 and S2 normal, no obvious murmurs  Abdomen:         Soft, non-tender, bowel sounds normal, no masses, no organomegaly    Lab Results   Component Value Date    WBC 9.34 01/30/2023    MCV 67 (L) 01/30/2023    RDW 18.3 (H) 01/30/2023     01/30/2023    GLU 84 01/30/2023    HGBA1C 5.4 01/09/2023    BUN 6 01/30/2023     01/30/2023    K 3.7 01/30/2023     01/30/2023        SEDATION PLAN: per anesthesia      History reviewed, vital signs satisfactory, cardiopulmonary status satisfactory, sedation options, risks and plans have been discussed with the patient  All their questions were answered and the patient agrees to the sedation procedures as planned and the patient is deemed an appropriate candidate for the sedation as planned.    The risks, benefits and alternatives of the procedure were discussed with the patient in detail. This discussion was had in the presence of her  and endoscopy staff. The risks include, risks of adverse reaction to sedation requiring the use of reversal agents, bleeding requiring blood transfusion, perforation requiring surgical intervention and technical failure. Other risks include aspiration leading to respiratory distress and respiratory failure resulting in endotracheal intubation and mechanical ventilation including death. If anesthesia is being utilized for this procedure, it is up to the anesthesiologist to determine airway safety including elective endotracheal intubation. Questions were answered, they agree to proceed. There was no language barriers.      Procedure explained to patient, informed consent obtained and placed in chart.    Radha Mckeon  3/21/2023  8:17 AM

## 2023-03-22 VITALS
HEIGHT: 67 IN | HEART RATE: 71 BPM | TEMPERATURE: 97 F | SYSTOLIC BLOOD PRESSURE: 153 MMHG | RESPIRATION RATE: 15 BRPM | BODY MASS INDEX: 45.99 KG/M2 | DIASTOLIC BLOOD PRESSURE: 83 MMHG | OXYGEN SATURATION: 100 % | WEIGHT: 293 LBS

## 2023-03-27 LAB
FINAL PATHOLOGIC DIAGNOSIS: NORMAL
Lab: NORMAL

## 2023-03-28 NOTE — PROGRESS NOTES
The biopsies of your small intestine and stomach are normal. There is no signs of infection or inability for you to absorb iron. As we discussed, your heavy menstrual cycles are likely the cause of your anemia. If you have not been evaluated by a gynecologist to look for fibroids that may be contributing to your heavy cycles and anemia, I recommend you be referred to one. No further GI workup is recommended at this time.

## 2023-03-29 ENCOUNTER — PATIENT MESSAGE (OUTPATIENT)
Dept: GASTROENTEROLOGY | Facility: CLINIC | Age: 33
End: 2023-03-29
Payer: COMMERCIAL

## 2023-04-25 ENCOUNTER — PATIENT MESSAGE (OUTPATIENT)
Dept: NEUROLOGY | Facility: CLINIC | Age: 33
End: 2023-04-25
Payer: COMMERCIAL

## 2023-07-06 DIAGNOSIS — G35 MULTIPLE SCLEROSIS: ICD-10-CM

## 2023-07-10 RX ORDER — PEGINTERFERON BETA-1A 125 UG/.5ML
INJECTION, SOLUTION INTRAMUSCULAR
Qty: 1 ML | Refills: 0 | Status: SHIPPED | OUTPATIENT
Start: 2023-07-10 | End: 2023-07-21 | Stop reason: SDUPTHER

## 2023-07-21 ENCOUNTER — PATIENT MESSAGE (OUTPATIENT)
Dept: PSYCHIATRY | Facility: CLINIC | Age: 33
End: 2023-07-21
Payer: COMMERCIAL

## 2023-07-21 DIAGNOSIS — G35 MULTIPLE SCLEROSIS: ICD-10-CM

## 2023-07-22 RX ORDER — PEGINTERFERON BETA-1A 125 UG/.5ML
INJECTION, SOLUTION INTRAMUSCULAR
Qty: 1 ML | Refills: 0 | Status: SHIPPED | OUTPATIENT
Start: 2023-07-22 | End: 2023-08-23 | Stop reason: SDUPTHER

## 2023-07-31 ENCOUNTER — LAB VISIT (OUTPATIENT)
Dept: LAB | Facility: HOSPITAL | Age: 33
End: 2023-07-31
Attending: CLINICAL NURSE SPECIALIST
Payer: COMMERCIAL

## 2023-07-31 DIAGNOSIS — G35 MULTIPLE SCLEROSIS: ICD-10-CM

## 2023-07-31 PROCEDURE — 85025 COMPLETE CBC W/AUTO DIFF WBC: CPT | Performed by: CLINICAL NURSE SPECIALIST

## 2023-07-31 PROCEDURE — 80053 COMPREHEN METABOLIC PANEL: CPT | Performed by: CLINICAL NURSE SPECIALIST

## 2023-07-31 PROCEDURE — 36415 COLL VENOUS BLD VENIPUNCTURE: CPT | Performed by: CLINICAL NURSE SPECIALIST

## 2023-08-01 LAB
ALBUMIN SERPL BCP-MCNC: 3.6 G/DL (ref 3.5–5.2)
ALP SERPL-CCNC: 62 U/L (ref 55–135)
ALT SERPL W/O P-5'-P-CCNC: 18 U/L (ref 10–44)
ANION GAP SERPL CALC-SCNC: 9 MMOL/L (ref 8–16)
AST SERPL-CCNC: 27 U/L (ref 10–40)
BASOPHILS # BLD AUTO: 0.03 K/UL (ref 0–0.2)
BASOPHILS NFR BLD: 0.3 % (ref 0–1.9)
BILIRUB SERPL-MCNC: 0.2 MG/DL (ref 0.1–1)
BUN SERPL-MCNC: 9 MG/DL (ref 6–20)
CALCIUM SERPL-MCNC: 9.4 MG/DL (ref 8.7–10.5)
CHLORIDE SERPL-SCNC: 104 MMOL/L (ref 95–110)
CO2 SERPL-SCNC: 24 MMOL/L (ref 23–29)
CREAT SERPL-MCNC: 0.7 MG/DL (ref 0.5–1.4)
DIFFERENTIAL METHOD: ABNORMAL
EOSINOPHIL # BLD AUTO: 0.1 K/UL (ref 0–0.5)
EOSINOPHIL NFR BLD: 0.7 % (ref 0–8)
ERYTHROCYTE [DISTWIDTH] IN BLOOD BY AUTOMATED COUNT: 15.9 % (ref 11.5–14.5)
EST. GFR  (NO RACE VARIABLE): >60 ML/MIN/1.73 M^2
GLUCOSE SERPL-MCNC: 78 MG/DL (ref 70–110)
HCT VFR BLD AUTO: 40.7 % (ref 37–48.5)
HGB BLD-MCNC: 11.8 G/DL (ref 12–16)
IMM GRANULOCYTES # BLD AUTO: 0.03 K/UL (ref 0–0.04)
IMM GRANULOCYTES NFR BLD AUTO: 0.3 % (ref 0–0.5)
LYMPHOCYTES # BLD AUTO: 3.5 K/UL (ref 1–4.8)
LYMPHOCYTES NFR BLD: 31.6 % (ref 18–48)
MCH RBC QN AUTO: 22.9 PG (ref 27–31)
MCHC RBC AUTO-ENTMCNC: 29 G/DL (ref 32–36)
MCV RBC AUTO: 79 FL (ref 82–98)
MONOCYTES # BLD AUTO: 0.5 K/UL (ref 0.3–1)
MONOCYTES NFR BLD: 4.7 % (ref 4–15)
NEUTROPHILS # BLD AUTO: 6.8 K/UL (ref 1.8–7.7)
NEUTROPHILS NFR BLD: 62.4 % (ref 38–73)
NRBC BLD-RTO: 0 /100 WBC
PLATELET # BLD AUTO: 396 K/UL (ref 150–450)
PMV BLD AUTO: 11.4 FL (ref 9.2–12.9)
POTASSIUM SERPL-SCNC: 4.5 MMOL/L (ref 3.5–5.1)
PROT SERPL-MCNC: 7.5 G/DL (ref 6–8.4)
RBC # BLD AUTO: 5.15 M/UL (ref 4–5.4)
SODIUM SERPL-SCNC: 137 MMOL/L (ref 136–145)
WBC # BLD AUTO: 10.94 K/UL (ref 3.9–12.7)

## 2023-08-23 DIAGNOSIS — G35 MULTIPLE SCLEROSIS: ICD-10-CM

## 2023-08-23 RX ORDER — PEGINTERFERON BETA-1A 125 UG/.5ML
INJECTION, SOLUTION INTRAMUSCULAR
Qty: 3 ML | Refills: 1 | Status: SHIPPED | OUTPATIENT
Start: 2023-08-23 | End: 2023-10-19 | Stop reason: SDUPTHER

## 2023-09-14 ENCOUNTER — OFFICE VISIT (OUTPATIENT)
Dept: DERMATOLOGY | Facility: CLINIC | Age: 33
End: 2023-09-14
Payer: COMMERCIAL

## 2023-09-14 DIAGNOSIS — L65.9 HAIR LOSS: Primary | ICD-10-CM

## 2023-09-14 PROCEDURE — 99999 PR PBB SHADOW E&M-EST. PATIENT-LVL III: ICD-10-PCS | Mod: PBBFAC,,, | Performed by: STUDENT IN AN ORGANIZED HEALTH CARE EDUCATION/TRAINING PROGRAM

## 2023-09-14 PROCEDURE — 3044F HG A1C LEVEL LT 7.0%: CPT | Mod: CPTII,S$GLB,, | Performed by: STUDENT IN AN ORGANIZED HEALTH CARE EDUCATION/TRAINING PROGRAM

## 2023-09-14 PROCEDURE — 99999 PR PBB SHADOW E&M-EST. PATIENT-LVL III: CPT | Mod: PBBFAC,,, | Performed by: STUDENT IN AN ORGANIZED HEALTH CARE EDUCATION/TRAINING PROGRAM

## 2023-09-14 PROCEDURE — 11900 INJECT SKIN LESIONS </W 7: CPT | Mod: S$GLB,,, | Performed by: STUDENT IN AN ORGANIZED HEALTH CARE EDUCATION/TRAINING PROGRAM

## 2023-09-14 PROCEDURE — 1159F MED LIST DOCD IN RCRD: CPT | Mod: CPTII,S$GLB,, | Performed by: STUDENT IN AN ORGANIZED HEALTH CARE EDUCATION/TRAINING PROGRAM

## 2023-09-14 PROCEDURE — 1159F PR MEDICATION LIST DOCUMENTED IN MEDICAL RECORD: ICD-10-PCS | Mod: CPTII,S$GLB,, | Performed by: STUDENT IN AN ORGANIZED HEALTH CARE EDUCATION/TRAINING PROGRAM

## 2023-09-14 PROCEDURE — 99204 OFFICE O/P NEW MOD 45 MIN: CPT | Mod: 25,S$GLB,, | Performed by: STUDENT IN AN ORGANIZED HEALTH CARE EDUCATION/TRAINING PROGRAM

## 2023-09-14 PROCEDURE — 11900 PR INJECTION INTO SKIN LESIONS, UP TO 7: ICD-10-PCS | Mod: S$GLB,,, | Performed by: STUDENT IN AN ORGANIZED HEALTH CARE EDUCATION/TRAINING PROGRAM

## 2023-09-14 PROCEDURE — 3044F PR MOST RECENT HEMOGLOBIN A1C LEVEL <7.0%: ICD-10-PCS | Mod: CPTII,S$GLB,, | Performed by: STUDENT IN AN ORGANIZED HEALTH CARE EDUCATION/TRAINING PROGRAM

## 2023-09-14 PROCEDURE — 1160F RVW MEDS BY RX/DR IN RCRD: CPT | Mod: CPTII,S$GLB,, | Performed by: STUDENT IN AN ORGANIZED HEALTH CARE EDUCATION/TRAINING PROGRAM

## 2023-09-14 PROCEDURE — 1160F PR REVIEW ALL MEDS BY PRESCRIBER/CLIN PHARMACIST DOCUMENTED: ICD-10-PCS | Mod: CPTII,S$GLB,, | Performed by: STUDENT IN AN ORGANIZED HEALTH CARE EDUCATION/TRAINING PROGRAM

## 2023-09-14 PROCEDURE — 99204 PR OFFICE/OUTPT VISIT, NEW, LEVL IV, 45-59 MIN: ICD-10-PCS | Mod: 25,S$GLB,, | Performed by: STUDENT IN AN ORGANIZED HEALTH CARE EDUCATION/TRAINING PROGRAM

## 2023-09-14 RX ORDER — CLOBETASOL PROPIONATE 0.46 MG/ML
SOLUTION TOPICAL DAILY
Qty: 50 ML | Refills: 1 | Status: SHIPPED | OUTPATIENT
Start: 2023-09-14

## 2023-09-14 NOTE — PROGRESS NOTES
Subjective:       Patient ID:  Kendy Ryder is a 32 y.o. female who presents for   Chief Complaint   Patient presents with    Hair Loss     Hair loss in the crown of the scalp      History of Present Illness: The patient presents with chief complaint of persistent hair thinning/loss.  Location: temple areas on the scalp, frontal crown  Duration: years, progressing  Signs/Symptoms: gradual thinning and balding in these area. Minimal itching, flaking noted.   Prior treatments: none      Hair Loss        Review of Systems   Constitutional:  Negative for fever and chills.   Skin:  Negative for itching, rash and dry skin.        Objective:    Physical Exam   Constitutional: She appears well-developed and well-nourished. No distress.   Neurological: She is alert and oriented to person, place, and time. She is not disoriented.   Psychiatric: She has a normal mood and affect.   Skin:   Areas Examined (abnormalities noted in diagram):   Scalp / Hair Palpated and Inspected  Head / Face Inspection Performed  Neck Inspection Performed              Diagram Legend     Erythematous scaling macule/papule c/w actinic keratosis       Vascular papule c/w angioma      Pigmented verrucoid papule/plaque c/w seborrheic keratosis      Yellow umbilicated papule c/w sebaceous hyperplasia      Irregularly shaped tan macule c/w lentigo     1-2 mm smooth white papules consistent with Milia      Movable subcutaneous cyst with punctum c/w epidermal inclusion cyst      Subcutaneous movable cyst c/w pilar cyst      Firm pink to brown papule c/w dermatofibroma      Pedunculated fleshy papule(s) c/w skin tag(s)      Evenly pigmented macule c/w junctional nevus     Mildly variegated pigmented, slightly irregular-bordered macule c/w mildly atypical nevus      Flesh colored to evenly pigmented papule c/w intradermal nevus       Pink pearly papule/plaque c/w basal cell carcinoma      Erythematous hyperkeratotic cursted plaque c/w SCC       Surgical scar with no sign of skin cancer recurrence      Open and closed comedones      Inflammatory papules and pustules      Verrucoid papule consistent consistent with wart     Erythematous eczematous patches and plaques     Dystrophic onycholytic nail with subungual debris c/w onychomycosis     Umbilicated papule    Erythematous-base heme-crusted tan verrucoid plaque consistent with inflamed seborrheic keratosis     Erythematous Silvery Scaling Plaque c/w Psoriasis     See annotation      Assessment / Plan:        Hair loss - Discussed treatment options. Start topical steroids and ILK injections today. Injection # 1  -     triamcinolone acetonide injection 10 mg  -     clobetasoL (TEMOVATE) 0.05 % external solution; Apply topically once daily.  Dispense: 50 mL; Refill: 1    Intralesional Kenalog 5mg/cc (4 cc total) injected into <7 lesions on the scalp today after obtaining verbal consent including risk of surrounding hypopigmentation. Patient tolerated procedure well.    NDC for Kenalog 10mg/cc:  7056-0555-79         Follow up in about 8 weeks (around 11/9/2023).

## 2023-10-19 DIAGNOSIS — G35 MULTIPLE SCLEROSIS: ICD-10-CM

## 2023-10-19 RX ORDER — PEGINTERFERON BETA-1A 125 UG/.5ML
INJECTION, SOLUTION INTRAMUSCULAR
Qty: 3 ML | Refills: 0 | Status: SHIPPED | OUTPATIENT
Start: 2023-10-19 | End: 2023-11-06 | Stop reason: SDUPTHER

## 2023-11-06 ENCOUNTER — PATIENT MESSAGE (OUTPATIENT)
Dept: NEUROLOGY | Facility: CLINIC | Age: 33
End: 2023-11-06
Payer: COMMERCIAL

## 2023-11-06 DIAGNOSIS — G35 MULTIPLE SCLEROSIS: ICD-10-CM

## 2023-11-08 RX ORDER — PEGINTERFERON BETA-1A 125 UG/.5ML
INJECTION, SOLUTION INTRAMUSCULAR
Qty: 1 ML | Refills: 2 | Status: SHIPPED | OUTPATIENT
Start: 2023-11-08 | End: 2024-01-18 | Stop reason: SDUPTHER

## 2023-11-22 ENCOUNTER — TELEPHONE (OUTPATIENT)
Dept: NEUROLOGY | Facility: CLINIC | Age: 33
End: 2023-11-22
Payer: COMMERCIAL

## 2023-11-22 ENCOUNTER — OFFICE VISIT (OUTPATIENT)
Dept: DERMATOLOGY | Facility: CLINIC | Age: 33
End: 2023-11-22
Payer: COMMERCIAL

## 2023-11-22 VITALS — BODY MASS INDEX: 45.99 KG/M2 | WEIGHT: 293 LBS | HEIGHT: 67 IN

## 2023-11-22 DIAGNOSIS — L65.9 HAIR LOSS: Primary | ICD-10-CM

## 2023-11-22 PROCEDURE — 99999 PR PBB SHADOW E&M-EST. PATIENT-LVL III: ICD-10-PCS | Mod: PBBFAC,,, | Performed by: STUDENT IN AN ORGANIZED HEALTH CARE EDUCATION/TRAINING PROGRAM

## 2023-11-22 PROCEDURE — 11900 PR INJECTION INTO SKIN LESIONS, UP TO 7: ICD-10-PCS | Mod: S$GLB,,, | Performed by: STUDENT IN AN ORGANIZED HEALTH CARE EDUCATION/TRAINING PROGRAM

## 2023-11-22 PROCEDURE — 99499 UNLISTED E&M SERVICE: CPT | Mod: S$GLB,,, | Performed by: STUDENT IN AN ORGANIZED HEALTH CARE EDUCATION/TRAINING PROGRAM

## 2023-11-22 PROCEDURE — 11900 INJECT SKIN LESIONS </W 7: CPT | Mod: S$GLB,,, | Performed by: STUDENT IN AN ORGANIZED HEALTH CARE EDUCATION/TRAINING PROGRAM

## 2023-11-22 PROCEDURE — 99999 PR PBB SHADOW E&M-EST. PATIENT-LVL III: CPT | Mod: PBBFAC,,, | Performed by: STUDENT IN AN ORGANIZED HEALTH CARE EDUCATION/TRAINING PROGRAM

## 2023-11-22 PROCEDURE — 99499 NO LOS: ICD-10-PCS | Mod: S$GLB,,, | Performed by: STUDENT IN AN ORGANIZED HEALTH CARE EDUCATION/TRAINING PROGRAM

## 2023-11-22 NOTE — PROGRESS NOTES
Subjective:       Patient ID:  Kendy Ryder is a 33 y.o. female who presents for   Chief Complaint   Patient presents with    Hair Loss    Rash     Pt visit for hair loss and small fine bumps on her chin      History of Present Illness: The patient presents for follow up of hair loss, last seen on 9/14/23 had ILK injections and using topical clobetasol. Symptoms stable. Would like repeat injections today.       Hair Loss    Rash        Review of Systems   Constitutional:  Negative for fever and chills.   Skin:  Negative for itching, rash and dry skin.        Objective:    Physical Exam   Constitutional: She appears well-developed and well-nourished. No distress.   Neurological: She is alert and oriented to person, place, and time. She is not disoriented.   Psychiatric: She has a normal mood and affect.   Skin:   Areas Examined (abnormalities noted in diagram):   Scalp / Hair Palpated and Inspected  Head / Face Inspection Performed  Neck Inspection Performed              Diagram Legend     Erythematous scaling macule/papule c/w actinic keratosis       Vascular papule c/w angioma      Pigmented verrucoid papule/plaque c/w seborrheic keratosis      Yellow umbilicated papule c/w sebaceous hyperplasia      Irregularly shaped tan macule c/w lentigo     1-2 mm smooth white papules consistent with Milia      Movable subcutaneous cyst with punctum c/w epidermal inclusion cyst      Subcutaneous movable cyst c/w pilar cyst      Firm pink to brown papule c/w dermatofibroma      Pedunculated fleshy papule(s) c/w skin tag(s)      Evenly pigmented macule c/w junctional nevus     Mildly variegated pigmented, slightly irregular-bordered macule c/w mildly atypical nevus      Flesh colored to evenly pigmented papule c/w intradermal nevus       Pink pearly papule/plaque c/w basal cell carcinoma      Erythematous hyperkeratotic cursted plaque c/w SCC      Surgical scar with no sign of skin cancer recurrence      Open and  closed comedones      Inflammatory papules and pustules      Verrucoid papule consistent consistent with wart     Erythematous eczematous patches and plaques     Dystrophic onycholytic nail with subungual debris c/w onychomycosis     Umbilicated papule    Erythematous-base heme-crusted tan verrucoid plaque consistent with inflamed seborrheic keratosis     Erythematous Silvery Scaling Plaque c/w Psoriasis     See annotation      Assessment / Plan:        Hair loss - Injection #2 today.   -     triamcinolone acetonide injection 10 mg    Intralesional Kenalog 5mg/cc (4 cc total) injected into <7 lesions on the scalp today after obtaining verbal consent including risk of surrounding hypopigmentation. Patient tolerated procedure well.      NDC for Kenalog 10mg/cc:  4011-6417-14         Follow up in about 10 weeks (around 1/31/2024).

## 2023-11-22 NOTE — TELEPHONE ENCOUNTER
----- Message from Ayesha Hannon RN sent at 11/22/2023  9:22 AM CST -----  Regarding: FW: Prior auth renewal  Contact: Hamzah @ [phone]    ----- Message -----  From: Ana Duckworth  Sent: 11/22/2023   8:50 AM CST  To: Heriberto SWENSON Staff  Subject: Prior auth renewal                               Hamzah [facility] is for status on prior auth renewal for peginterferon beta-1a (PLEGRIDY) 125 mcg/0.5 mL Syrg. Please call to further advise. Ms. Goodson is advising she faxed over a cover my meds form for pt. (The key number is [id number]) Thank you for all you are doing.

## 2023-11-22 NOTE — TELEPHONE ENCOUNTER
ANGELA JAQUEZ Key: [id number] - PA Case ID: [id number] - Rx #: [id number]    CaseId:[id number];Status:Approved;Review Type:Prior Auth;Coverage Start Date:10/23/2023;Coverage End Date:11/21/2024;    PA for Plegridy approved

## 2023-12-22 ENCOUNTER — PATIENT MESSAGE (OUTPATIENT)
Dept: NEUROLOGY | Facility: CLINIC | Age: 33
End: 2023-12-22
Payer: COMMERCIAL

## 2023-12-22 ENCOUNTER — HOSPITAL ENCOUNTER (OUTPATIENT)
Dept: RADIOLOGY | Facility: HOSPITAL | Age: 33
Discharge: HOME | End: 2023-12-22
Attending: CLINICAL NURSE SPECIALIST
Payer: COMMERCIAL

## 2023-12-22 DIAGNOSIS — G35 MULTIPLE SCLEROSIS: ICD-10-CM

## 2023-12-22 PROCEDURE — A9585 GADOBUTROL INJECTION: HCPCS | Performed by: CLINICAL NURSE SPECIALIST

## 2023-12-22 PROCEDURE — 70553 MRI BRAIN DEMYELINATING W/ WO CONTRAST: ICD-10-PCS | Mod: 26,,, | Performed by: RADIOLOGY

## 2023-12-22 PROCEDURE — 70553 MRI BRAIN STEM W/O & W/DYE: CPT | Mod: 26,,, | Performed by: RADIOLOGY

## 2023-12-22 PROCEDURE — 70553 MRI BRAIN STEM W/O & W/DYE: CPT | Mod: TC

## 2023-12-22 PROCEDURE — 25500020 PHARM REV CODE 255: Performed by: CLINICAL NURSE SPECIALIST

## 2023-12-22 RX ORDER — GADOBUTROL 604.72 MG/ML
10 INJECTION INTRAVENOUS
Status: COMPLETED | OUTPATIENT
Start: 2023-12-22 | End: 2023-12-22

## 2023-12-22 RX ADMIN — GADOBUTROL 10 ML: 604.72 INJECTION INTRAVENOUS at 08:12

## 2024-01-18 ENCOUNTER — LAB VISIT (OUTPATIENT)
Dept: LAB | Facility: HOSPITAL | Age: 34
End: 2024-01-18
Payer: COMMERCIAL

## 2024-01-18 ENCOUNTER — OFFICE VISIT (OUTPATIENT)
Dept: NEUROLOGY | Facility: CLINIC | Age: 34
End: 2024-01-18
Payer: COMMERCIAL

## 2024-01-18 VITALS
HEART RATE: 99 BPM | BODY MASS INDEX: 45.99 KG/M2 | SYSTOLIC BLOOD PRESSURE: 170 MMHG | WEIGHT: 293 LBS | DIASTOLIC BLOOD PRESSURE: 108 MMHG | HEIGHT: 67 IN

## 2024-01-18 DIAGNOSIS — G35 MULTIPLE SCLEROSIS: ICD-10-CM

## 2024-01-18 DIAGNOSIS — Z71.89 COUNSELING REGARDING GOALS OF CARE: ICD-10-CM

## 2024-01-18 DIAGNOSIS — Z29.89 PROPHYLACTIC IMMUNOTHERAPY: ICD-10-CM

## 2024-01-18 DIAGNOSIS — G35 MULTIPLE SCLEROSIS: Primary | ICD-10-CM

## 2024-01-18 DIAGNOSIS — F41.9 ANXIETY: ICD-10-CM

## 2024-01-18 LAB
25(OH)D3+25(OH)D2 SERPL-MCNC: 147 NG/ML (ref 30–96)
ALBUMIN SERPL BCP-MCNC: 3.6 G/DL (ref 3.5–5.2)
ALP SERPL-CCNC: 53 U/L (ref 55–135)
ALT SERPL W/O P-5'-P-CCNC: 25 U/L (ref 10–44)
ANION GAP SERPL CALC-SCNC: 12 MMOL/L (ref 8–16)
AST SERPL-CCNC: 32 U/L (ref 10–40)
BASOPHILS # BLD AUTO: 0.03 K/UL (ref 0–0.2)
BASOPHILS NFR BLD: 0.3 % (ref 0–1.9)
BILIRUB SERPL-MCNC: 0.3 MG/DL (ref 0.1–1)
BUN SERPL-MCNC: 8 MG/DL (ref 6–20)
CALCIUM SERPL-MCNC: 9.6 MG/DL (ref 8.7–10.5)
CHLORIDE SERPL-SCNC: 109 MMOL/L (ref 95–110)
CO2 SERPL-SCNC: 19 MMOL/L (ref 23–29)
CREAT SERPL-MCNC: 0.8 MG/DL (ref 0.5–1.4)
DIFFERENTIAL METHOD BLD: ABNORMAL
EOSINOPHIL # BLD AUTO: 0.1 K/UL (ref 0–0.5)
EOSINOPHIL NFR BLD: 0.7 % (ref 0–8)
ERYTHROCYTE [DISTWIDTH] IN BLOOD BY AUTOMATED COUNT: 19.8 % (ref 11.5–14.5)
EST. GFR  (NO RACE VARIABLE): >60 ML/MIN/1.73 M^2
GLUCOSE SERPL-MCNC: 89 MG/DL (ref 70–110)
HCT VFR BLD AUTO: 39.2 % (ref 37–48.5)
HGB BLD-MCNC: 11.6 G/DL (ref 12–16)
IMM GRANULOCYTES # BLD AUTO: 0.02 K/UL (ref 0–0.04)
IMM GRANULOCYTES NFR BLD AUTO: 0.2 % (ref 0–0.5)
LYMPHOCYTES # BLD AUTO: 3.4 K/UL (ref 1–4.8)
LYMPHOCYTES NFR BLD: 32.2 % (ref 18–48)
MCH RBC QN AUTO: 22.1 PG (ref 27–31)
MCHC RBC AUTO-ENTMCNC: 29.6 G/DL (ref 32–36)
MCV RBC AUTO: 75 FL (ref 82–98)
MONOCYTES # BLD AUTO: 0.5 K/UL (ref 0.3–1)
MONOCYTES NFR BLD: 4.8 % (ref 4–15)
NEUTROPHILS # BLD AUTO: 6.5 K/UL (ref 1.8–7.7)
NEUTROPHILS NFR BLD: 61.8 % (ref 38–73)
NRBC BLD-RTO: 0 /100 WBC
PLATELET # BLD AUTO: 417 K/UL (ref 150–450)
PMV BLD AUTO: 10.6 FL (ref 9.2–12.9)
POTASSIUM SERPL-SCNC: 4.1 MMOL/L (ref 3.5–5.1)
PROT SERPL-MCNC: 7.9 G/DL (ref 6–8.4)
RBC # BLD AUTO: 5.26 M/UL (ref 4–5.4)
SODIUM SERPL-SCNC: 140 MMOL/L (ref 136–145)
WBC # BLD AUTO: 10.54 K/UL (ref 3.9–12.7)

## 2024-01-18 PROCEDURE — 82306 VITAMIN D 25 HYDROXY: CPT | Performed by: CLINICAL NURSE SPECIALIST

## 2024-01-18 PROCEDURE — 1159F MED LIST DOCD IN RCRD: CPT | Mod: CPTII,S$GLB,, | Performed by: CLINICAL NURSE SPECIALIST

## 2024-01-18 PROCEDURE — 3080F DIAST BP >= 90 MM HG: CPT | Mod: CPTII,S$GLB,, | Performed by: CLINICAL NURSE SPECIALIST

## 2024-01-18 PROCEDURE — 3077F SYST BP >= 140 MM HG: CPT | Mod: CPTII,S$GLB,, | Performed by: CLINICAL NURSE SPECIALIST

## 2024-01-18 PROCEDURE — 99999 PR PBB SHADOW E&M-EST. PATIENT-LVL IV: CPT | Mod: PBBFAC,,, | Performed by: CLINICAL NURSE SPECIALIST

## 2024-01-18 PROCEDURE — G2211 COMPLEX E/M VISIT ADD ON: HCPCS | Mod: S$GLB,,, | Performed by: CLINICAL NURSE SPECIALIST

## 2024-01-18 PROCEDURE — 85025 COMPLETE CBC W/AUTO DIFF WBC: CPT | Performed by: CLINICAL NURSE SPECIALIST

## 2024-01-18 PROCEDURE — 80053 COMPREHEN METABOLIC PANEL: CPT | Performed by: CLINICAL NURSE SPECIALIST

## 2024-01-18 PROCEDURE — 36415 COLL VENOUS BLD VENIPUNCTURE: CPT | Performed by: CLINICAL NURSE SPECIALIST

## 2024-01-18 PROCEDURE — 99214 OFFICE O/P EST MOD 30 MIN: CPT | Mod: S$GLB,,, | Performed by: CLINICAL NURSE SPECIALIST

## 2024-01-18 PROCEDURE — 3008F BODY MASS INDEX DOCD: CPT | Mod: CPTII,S$GLB,, | Performed by: CLINICAL NURSE SPECIALIST

## 2024-01-18 NOTE — PROGRESS NOTES
"Subjective:          Patient ID: Kendy Ryder is a 33 y.o. female who presents today for a routine clinic visit for MS.  She was last seen in January 2023. The history has been provided by the patient.     MS HPI:  DMT: Plegridy--started in January 2023   Side effects from DMT? Some bruising; flu-like side effects--managed with electrolytes and Chet aspirin   Taking vitamin D3 as recommended? 10,000 units daily   She is currently trying to conceive. She is working with a fertility specialist in .   She felt a muscle spasm in her right inner thigh a few days after her injection that lasted for about 2 days.  She felt like the movement on her right foot was not as mesa for 2 days. She takes magnesium, but had not been taking it regularly during the time of the spasm.   She is walking one mile a day for exercise.   She denies any recent infections, except for a mild sinus infection in September.   She feels like she is getting fatigued more quickly with the recent cold weather. This also happens with the heat.   She is doing acupuncture, which helps with stress, anxiety, sleep--[facility]. She has social anxiety. When her anxiety kicks in, she "shuts down." The last time she felt social anxiety, she noticed increased leg pain (not sure if this was due to Plegridy injection or anxiety). She would like to take something for anxiety.   She is currently doing counseling.   She is working full-time.     Medications:  Current Outpatient Medications   Medication Sig    alpha lipoic acid 100 mg Cap Take by mouth.    biotin 10,000 mcg Cap Take by mouth.    cholecalciferol, vitamin D3, 125 mcg (5,000 unit) Tab Take 10,000 Units by mouth once daily.    citalopram (CELEXA) 10 MG tablet Take 1 tablet (10 mg total) by mouth once daily.    clobetasoL (TEMOVATE) 0.05 % external solution Apply topically once daily.    metFORMIN (GLUCOPHAGE-XR) 500 MG ER 24hr tablet Take 500 mg by mouth 3 (three) times daily.    " OZEMPIC 0.25 mg or 0.5 mg(2 mg/1.5 mL) pen injector INJECT 0.25 MG INTO THE SKIN EVERY 7 DAYS    peginterferon beta-1a (PLEGRIDY) 125 mcg/0.5 mL Syrg INJECT 0.5 ML (125 MCG TOTAL) INTO THE MUSCLE EVERY 14 (FOURTEEN) DAYS. Strength: 125 mcg/0.5 mL       SOCIAL HISTORY  Social History     Tobacco Use    Smoking status: Never    Smokeless tobacco: Never   Substance Use Topics    Alcohol use: Not Currently    Drug use: No       Living arrangements - the patient lives with her      ROS:      1/18/2024     7:02 AM   REVIEW OF SYMPTOMS   Do you feel abnormally tired on most days? No   Do you feel you generally sleep well? Yes   Do you have difficulty controlling your bladder?  No   Do you have difficulty controlling your bowels?  No   Do you have frequent muscle cramps, tightness or spasms in your limbs?  No   Do you have new visual symptoms?  No   Do you have worsening difficulty with your memory or thinking? No   Do you have worsening symptoms of anxiety or depression?  No--had a bout of depression in November when she was on her cycle; social anxiety is significant    For patients who walk, Do you have more difficulty walking?  No   Have you fallen since your last visit?  No   For patients who use wheelchairs: Do you have any skin wounds or breakdown? Not Applicable   Do you have difficulty using your hands?  No   Do you have shooting or burning pain? No   Do you have difficulty with sexual function?  No   If you are sexually active, are you using birth control? Y/N  N/A No   Do you often choke when swallowing liquids or solid food?  No   Do you experience worsening symptoms when overheated? Yes   Do you need any new equipment such as a wheelchair, walker or shower chair? No   Do you receive co-pay financial assistance for your principal MS medicine? Yes   Would you be interested in participating in an MS research trial in the future? Yes   For patients on Gilenya, Tecfidera, Aubagio, Rituxan, Ocrevus, Tysabri,  Lemtrada or Methotrexate, are you aware that you should NOT receive live virus vaccines?  Not Applicable   Do you feel you have adequate family/friend support?  Yes   Do you have health insurance?   Yes   Are you currently employed? Yes   Do you receive SSDI/SSI?  Not Applicable   Do you use marijuana or cannabis products? Yes   How often? Monthly   Have you been diagnosed with a urinary tract infection since your last visit here? No   Have you been diagnosed with a respiratory tract infection since your last visit here? No   Have you been to the emergency room since your last visit here? No   Have you been hospitalized since your last visit here?  No            Objective:        1. 25 foot timed walk:      5/25/2022    12:01 AM 1/18/2024    12:01 AM   Timed 25 Foot Walk:   Did patient wear an AFO? No No   Was assistive device used? No No   Time for 25 Foot Walk (seconds) 3.9 3.3   Time for 25 Foot Walk (seconds)  3.6     Neurologic Exam     Mental Status   Oriented to person, place, and time.   Attention: normal. Concentration: normal.   Speech: speech is normal   Level of consciousness: alert  Knowledge: good.   Normal comprehension.     Cranial Nerves     CN II   Visual acuity: normal with correction    CN III, IV, VI   Pupils are equal, round, and reactive to light.  Extraocular motions are normal.   Right pupil: Shape: regular. Reactivity: brisk.   Left pupil: Shape: regular. Reactivity: brisk.   CN III: no CN III palsy  CN VI: no CN VI palsy  Nystagmus: none     CN V   Right facial sensation deficit: none  Left facial sensation deficit: none    CN VII   Right facial weakness: none  Left facial weakness: none    CN VIII   Hearing: intact    CN IX, X   Palate: symmetric    CN XI   Right sternocleidomastoid strength: normal  Left sternocleidomastoid strength: normal  Right trapezius strength: normal  Left trapezius strength: normal    CN XII   Tongue deviation: none    Motor Exam   Muscle bulk: normal  Overall  muscle tone: normal    Strength   Strength 5/5 throughout.   Right neck flexion: 5/5  Left neck flexion: 5/5  Right neck extension: 5/5  Left neck extension: 5/5  Right deltoid: 5/5  Left deltoid: 5/5  Right biceps: 5/5  Left biceps: 5/5  Right triceps: 5/5  Left triceps: 5/5  Right wrist flexion: 5/5  Left wrist flexion: 5/5  Right wrist extension: 5/5  Left wrist extension: 5/5  Right interossei: 5/5  Left interossei: 5/5  Right iliopsoas: 5/5  Left iliopsoas: 5/5  Right quadriceps: 5/5  Left quadriceps: 5/5  Right hamstrin/5  Left hamstrin/5  Right anterior tibial: 5/5  Left anterior tibial: 5/5  Right gastroc: 5/5  Left gastroc: 5/5    Sensory Exam   Right arm vibration: normal  Left arm vibration: normal  Right leg vibration: normal  Left leg vibration: normal    Gait, Coordination, and Reflexes     Gait  Gait: normal    Coordination   Romberg: negative  Finger to nose coordination: normal  Heel to shin coordination: normal  Tandem walking coordination: normal    Tremor   Resting tremor: absent    Reflexes   Right brachioradialis: 1+  Left brachioradialis: 1+  Right biceps: 2+  Left biceps: 1+  Right triceps: 1+  Left triceps: 1+  Right patellar: 1+  Left patellar: 1+  Right achilles: 0  Left achilles: 0  Right plantar: normal  Left plantar: normal  Normal RSM in UE and LE  She can walk on toes and heels.          Imaging:       Results for orders placed during the hospital encounter of 23    MRI Brain Demyelinating W W/O Contrast    Impression  Findings in the cerebral white matter which are typical for multiple sclerosis.    Suspect slight interval increase in number of plaques involving the right frontal lobe.  No evidence of active demyelination.      Electronically signed by: Onur Mari MD  Date:    2023  Time:    09:58      Images were reviewed with the patient.         Labs:     Lab Results   Component Value Date    PZEEFLFS16GU 88 2021    [id number] 66 10/02/2019     AQWAKNSF06SF 46 02/06/2019       Lab Results   Component Value Date    WBC 10.94 07/31/2023    RBC 5.15 07/31/2023    HGB 11.8 (L) 07/31/2023    HCT 40.7 07/31/2023    MCV 79 (L) 07/31/2023    MCH 22.9 (L) 07/31/2023    MCHC 29.0 (L) 07/31/2023    RDW 15.9 (H) 07/31/2023     07/31/2023    MPV 11.4 07/31/2023    GRAN 6.8 07/31/2023    GRAN 62.4 07/31/2023    LYMPH 3.5 07/31/2023    LYMPH 31.6 07/31/2023    MONO 0.5 07/31/2023    MONO 4.7 07/31/2023    EOS 0.1 07/31/2023    BASO 0.03 07/31/2023    EOSINOPHIL 0.7 07/31/2023    BASOPHIL 0.3 07/31/2023     Sodium   Date Value Ref Range Status   07/31/2023 137 136 - 145 mmol/L Final     Potassium   Date Value Ref Range Status   07/31/2023 4.5 3.5 - 5.1 mmol/L Final     Chloride   Date Value Ref Range Status   07/31/2023 104 95 - 110 mmol/L Final     CO2   Date Value Ref Range Status   07/31/2023 24 23 - 29 mmol/L Final     Glucose   Date Value Ref Range Status   07/31/2023 78 70 - 110 mg/dL Final     BUN   Date Value Ref Range Status   07/31/2023 9 6 - 20 mg/dL Final     Creatinine   Date Value Ref Range Status   07/31/2023 0.7 0.5 - 1.4 mg/dL Final     Calcium   Date Value Ref Range Status   07/31/2023 9.4 8.7 - 10.5 mg/dL Final     Total Protein   Date Value Ref Range Status   07/31/2023 7.5 6.0 - 8.4 g/dL Final     Albumin   Date Value Ref Range Status   07/31/2023 3.6 3.5 - 5.2 g/dL Final     Total Bilirubin   Date Value Ref Range Status   07/31/2023 0.2 0.1 - 1.0 mg/dL Final     Comment:     For infants and newborns, interpretation of results should be based  on gestational age, weight and in agreement with clinical  observations.    Premature Infant recommended reference ranges:  Up to 24 hours.............<8.0 mg/dL  Up to 48 hours............<12.0 mg/dL  3-5 days..................<15.0 mg/dL  6-29 days.................<15.0 mg/dL       Alkaline Phosphatase   Date Value Ref Range Status   07/31/2023 62 55 - 135 U/L Final     AST   Date Value Ref Range Status    07/31/2023 27 10 - 40 U/L Final     ALT   Date Value Ref Range Status   07/31/2023 18 10 - 44 U/L Final     Anion Gap   Date Value Ref Range Status   07/31/2023 9 8 - 16 mmol/L Final     eGFR if    Date Value Ref Range Status   01/16/2019 >60 >60 mL/min/1.73 m^2 Final     eGFR if non    Date Value Ref Range Status   01/16/2019 >60 >60 mL/min/1.73 m^2 Final     Comment:     Calculation used to obtain the estimated glomerular filtration  rate (eGFR) is the CKD-EPI equation.        Lab Results   Component Value Date    HEPBSAG Negative 06/17/2022    HEPBSAB Positive 06/17/2022    HEPBCAB Negative 06/17/2022       MS Impression and Plan:     NEURO MULTIPLE SCLEROSIS IMPRESSION:   MS Status:     Number of relapses in the past year?:  0    Clinical Progression:  Clinically Stable    MRI Progression:  Worsened (new lesions)    MRI Progression comment:  Compared to 2022, but Plegridy was not started until January 2023   Plan:     DMT:  No change in management    DMT comment:  Continue Plergridy and Vitamin D. Her safety labs are due today.     Symptom Management:  Implement change in symptom management    Implement Change in Symptom Management:  Mood (Discussed starting fluoxetine 20mg, but she will confirm with her OBGYN that it is safe to take while trying to conceive.)       MRI brain in June   She will follow up with Dr. Cervantes in July after MRI.     The visit today is associated with current or anticipated ongoing medical care related to this patient's single serious condition/complex condition of multiple sclerosis. She will see Dr. Cervantes in 6 months and me in 1 year.     Total time spent with patient: 27 minutes   Total time spent on encounter: 39 minutes         LUIS ENRIQUE Hernandez, CNS    Problem List Items Addressed This Visit          Neurologic Problems    Multiple sclerosis - Primary    Relevant Orders    CBC auto differential    Comprehensive Metabolic Panel    MRI Brain  Demyelinating W W/O Contrast    Vitamin D

## 2024-01-18 NOTE — PATIENT INSTRUCTIONS
Fluoxetine 20mg for anxiety--please ask obgyn     To schedule MRIs in the [place] region, please call [phone]. Please get brain MRI done sometime in June.

## 2024-01-19 ENCOUNTER — PATIENT MESSAGE (OUTPATIENT)
Dept: NEUROLOGY | Facility: CLINIC | Age: 34
End: 2024-01-19
Payer: COMMERCIAL

## 2024-01-19 DIAGNOSIS — F41.9 ANXIETY: Primary | ICD-10-CM

## 2024-01-19 RX ORDER — PEGINTERFERON BETA-1A 125 UG/.5ML
INJECTION, SOLUTION INTRAMUSCULAR
Qty: 1 ML | Refills: 2 | Status: SHIPPED | OUTPATIENT
Start: 2024-01-19 | End: 2024-04-12

## 2024-01-22 ENCOUNTER — PATIENT MESSAGE (OUTPATIENT)
Dept: PSYCHIATRY | Facility: CLINIC | Age: 34
End: 2024-01-22
Payer: COMMERCIAL

## 2024-01-22 RX ORDER — BUPROPION HYDROCHLORIDE 150 MG/1
150 TABLET ORAL DAILY
Qty: 30 TABLET | Refills: 3 | Status: SHIPPED | OUTPATIENT
Start: 2024-01-22 | End: 2024-06-05

## 2024-01-23 ENCOUNTER — DOCUMENTATION ONLY (OUTPATIENT)
Dept: NEUROLOGY | Facility: CLINIC | Age: 34
End: 2024-01-23
Payer: COMMERCIAL

## 2024-01-23 NOTE — PROGRESS NOTES
ANGELA JAQUEZ (Guzman: [id number]) - [id number]     PA for Plegridy submitted via [facility]    PA approved  CaseId:[id number];Status:Approved;Review Type:Prior Auth;Coverage Start Date:12/24/2023;Coverage End Date:01/22/2025;

## 2024-02-02 ENCOUNTER — PATIENT MESSAGE (OUTPATIENT)
Dept: NEUROLOGY | Facility: CLINIC | Age: 34
End: 2024-02-02
Payer: COMMERCIAL

## 2024-02-02 DIAGNOSIS — R42 DIZZINESS: Primary | ICD-10-CM

## 2024-02-06 ENCOUNTER — LAB VISIT (OUTPATIENT)
Dept: LAB | Facility: HOSPITAL | Age: 34
End: 2024-02-06
Attending: CLINICAL NURSE SPECIALIST
Payer: COMMERCIAL

## 2024-02-06 DIAGNOSIS — R42 DIZZINESS: ICD-10-CM

## 2024-02-06 DIAGNOSIS — G35 MULTIPLE SCLEROSIS: ICD-10-CM

## 2024-02-06 LAB
ALBUMIN SERPL BCP-MCNC: 3.6 G/DL (ref 3.5–5.2)
ALP SERPL-CCNC: 58 U/L (ref 55–135)
ALT SERPL W/O P-5'-P-CCNC: 20 U/L (ref 10–44)
ANION GAP SERPL CALC-SCNC: 9 MMOL/L (ref 8–16)
AST SERPL-CCNC: 25 U/L (ref 10–40)
BILIRUB SERPL-MCNC: 0.3 MG/DL (ref 0.1–1)
BUN SERPL-MCNC: 9 MG/DL (ref 6–20)
CALCIUM SERPL-MCNC: 9.4 MG/DL (ref 8.7–10.5)
CHLORIDE SERPL-SCNC: 106 MMOL/L (ref 95–110)
CO2 SERPL-SCNC: 22 MMOL/L (ref 23–29)
CREAT SERPL-MCNC: 0.7 MG/DL (ref 0.5–1.4)
EST. GFR  (NO RACE VARIABLE): >60 ML/MIN/1.73 M^2
GLUCOSE SERPL-MCNC: 81 MG/DL (ref 70–110)
POTASSIUM SERPL-SCNC: 4.3 MMOL/L (ref 3.5–5.1)
PROT SERPL-MCNC: 7.3 G/DL (ref 6–8.4)
SODIUM SERPL-SCNC: 137 MMOL/L (ref 136–145)

## 2024-02-06 PROCEDURE — 82728 ASSAY OF FERRITIN: CPT | Performed by: CLINICAL NURSE SPECIALIST

## 2024-02-06 PROCEDURE — 80053 COMPREHEN METABOLIC PANEL: CPT | Performed by: CLINICAL NURSE SPECIALIST

## 2024-02-06 PROCEDURE — 84443 ASSAY THYROID STIM HORMONE: CPT | Performed by: CLINICAL NURSE SPECIALIST

## 2024-02-06 PROCEDURE — 36415 COLL VENOUS BLD VENIPUNCTURE: CPT | Performed by: CLINICAL NURSE SPECIALIST

## 2024-02-06 PROCEDURE — 85025 COMPLETE CBC W/AUTO DIFF WBC: CPT | Performed by: CLINICAL NURSE SPECIALIST

## 2024-02-06 RX ORDER — MECLIZINE HYDROCHLORIDE 25 MG/1
25 TABLET ORAL DAILY PRN
Qty: 15 TABLET | Refills: 0 | Status: SHIPPED | OUTPATIENT
Start: 2024-02-06 | End: 2024-04-24

## 2024-02-07 LAB
BASOPHILS # BLD AUTO: 0.05 K/UL (ref 0–0.2)
BASOPHILS NFR BLD: 0.3 % (ref 0–1.9)
DIFFERENTIAL METHOD BLD: ABNORMAL
EOSINOPHIL # BLD AUTO: 0.2 K/UL (ref 0–0.5)
EOSINOPHIL NFR BLD: 1 % (ref 0–8)
ERYTHROCYTE [DISTWIDTH] IN BLOOD BY AUTOMATED COUNT: 19.8 % (ref 11.5–14.5)
FERRITIN SERPL-MCNC: 39 NG/ML (ref 20–300)
HCT VFR BLD AUTO: 39.4 % (ref 37–48.5)
HGB BLD-MCNC: 11.4 G/DL (ref 12–16)
IMM GRANULOCYTES # BLD AUTO: 0.05 K/UL (ref 0–0.04)
IMM GRANULOCYTES NFR BLD AUTO: 0.3 % (ref 0–0.5)
LYMPHOCYTES # BLD AUTO: 4.9 K/UL (ref 1–4.8)
LYMPHOCYTES NFR BLD: 33.3 % (ref 18–48)
MCH RBC QN AUTO: 22.2 PG (ref 27–31)
MCHC RBC AUTO-ENTMCNC: 28.9 G/DL (ref 32–36)
MCV RBC AUTO: 77 FL (ref 82–98)
MONOCYTES # BLD AUTO: 0.8 K/UL (ref 0.3–1)
MONOCYTES NFR BLD: 5.1 % (ref 4–15)
NEUTROPHILS # BLD AUTO: 8.8 K/UL (ref 1.8–7.7)
NEUTROPHILS NFR BLD: 60 % (ref 38–73)
NRBC BLD-RTO: 0 /100 WBC
PLATELET # BLD AUTO: 549 K/UL (ref 150–450)
PMV BLD AUTO: 10.9 FL (ref 9.2–12.9)
RBC # BLD AUTO: 5.14 M/UL (ref 4–5.4)
TSH SERPL DL<=0.005 MIU/L-ACNC: 1.86 UIU/ML (ref 0.4–4)
WBC # BLD AUTO: 14.69 K/UL (ref 3.9–12.7)

## 2024-02-09 ENCOUNTER — PATIENT MESSAGE (OUTPATIENT)
Dept: NEUROLOGY | Facility: CLINIC | Age: 34
End: 2024-02-09
Payer: COMMERCIAL

## 2024-02-14 ENCOUNTER — OFFICE VISIT (OUTPATIENT)
Dept: DERMATOLOGY | Facility: CLINIC | Age: 34
End: 2024-02-14
Payer: COMMERCIAL

## 2024-02-14 DIAGNOSIS — L65.9 HAIR LOSS: Primary | ICD-10-CM

## 2024-02-14 PROCEDURE — 11900 INJECT SKIN LESIONS </W 7: CPT | Mod: S$GLB,,, | Performed by: STUDENT IN AN ORGANIZED HEALTH CARE EDUCATION/TRAINING PROGRAM

## 2024-02-14 PROCEDURE — 99499 UNLISTED E&M SERVICE: CPT | Mod: S$GLB,,, | Performed by: STUDENT IN AN ORGANIZED HEALTH CARE EDUCATION/TRAINING PROGRAM

## 2024-02-14 PROCEDURE — 99999 PR PBB SHADOW E&M-EST. PATIENT-LVL III: CPT | Mod: PBBFAC,,, | Performed by: STUDENT IN AN ORGANIZED HEALTH CARE EDUCATION/TRAINING PROGRAM

## 2024-02-14 NOTE — PROGRESS NOTES
Subjective:       Patient ID:  Kendy Ryder is a 33 y.o. female who presents for   Chief Complaint   Patient presents with    Hair Loss     Follow up for hair loss.  Reports things are slow and steady.  Denies itching or flaking.      History of Present Illness: The patient presents for follow up of hair loss, last seen on 11/22/23 had ILK injections again and using topical clobetasol. Symptoms stable. Denies any further hair loss/thinning; a little regrowth noted. Would like repeat injections today. No other concerning rash or skin lesions.     Hair Loss        Review of Systems   Constitutional:  Negative for fever and chills.   Skin:  Negative for itching, rash and dry skin.        Objective:    Physical Exam   Constitutional: She appears well-developed and well-nourished. No distress.   Neurological: She is alert and oriented to person, place, and time. She is not disoriented.   Psychiatric: She has a normal mood and affect.   Skin:   Areas Examined (abnormalities noted in diagram):   Scalp / Hair Palpated and Inspected  Head / Face Inspection Performed  Neck Inspection Performed              Diagram Legend     Erythematous scaling macule/papule c/w actinic keratosis       Vascular papule c/w angioma      Pigmented verrucoid papule/plaque c/w seborrheic keratosis      Yellow umbilicated papule c/w sebaceous hyperplasia      Irregularly shaped tan macule c/w lentigo     1-2 mm smooth white papules consistent with Milia      Movable subcutaneous cyst with punctum c/w epidermal inclusion cyst      Subcutaneous movable cyst c/w pilar cyst      Firm pink to brown papule c/w dermatofibroma      Pedunculated fleshy papule(s) c/w skin tag(s)      Evenly pigmented macule c/w junctional nevus     Mildly variegated pigmented, slightly irregular-bordered macule c/w mildly atypical nevus      Flesh colored to evenly pigmented papule c/w intradermal nevus       Pink pearly papule/plaque c/w basal cell carcinoma       Erythematous hyperkeratotic cursted plaque c/w SCC      Surgical scar with no sign of skin cancer recurrence      Open and closed comedones      Inflammatory papules and pustules      Verrucoid papule consistent consistent with wart     Erythematous eczematous patches and plaques     Dystrophic onycholytic nail with subungual debris c/w onychomycosis     Umbilicated papule    Erythematous-base heme-crusted tan verrucoid plaque consistent with inflamed seborrheic keratosis     Erythematous Silvery Scaling Plaque c/w Psoriasis     See annotation      Assessment / Plan:        Hair loss - stable. Injection #3 today.   -     triamcinolone acetonide injection 10 mg    Intralesional Kenalog 5mg/cc (4 cc total) injected into <7 lesions on the scalp today after obtaining verbal consent including risk of surrounding hypopigmentation. Patient tolerated procedure well.    NDC for Kenalog 10mg/cc:  2787-9781-03             No follow-ups on file.

## 2024-03-26 ENCOUNTER — PATIENT MESSAGE (OUTPATIENT)
Dept: PSYCHIATRY | Facility: CLINIC | Age: 34
End: 2024-03-26
Payer: COMMERCIAL

## 2024-04-03 ENCOUNTER — PATIENT MESSAGE (OUTPATIENT)
Dept: PULMONOLOGY | Facility: CLINIC | Age: 34
End: 2024-04-03
Payer: COMMERCIAL

## 2024-04-12 DIAGNOSIS — G35 MULTIPLE SCLEROSIS: ICD-10-CM

## 2024-04-12 RX ORDER — PEGINTERFERON BETA-1A 125 UG/.5ML
INJECTION, SOLUTION INTRAMUSCULAR
Qty: 1 ML | Refills: 2 | Status: SHIPPED | OUTPATIENT
Start: 2024-04-12

## 2024-04-24 ENCOUNTER — OFFICE VISIT (OUTPATIENT)
Dept: FAMILY MEDICINE | Facility: CLINIC | Age: 34
End: 2024-04-24
Payer: COMMERCIAL

## 2024-04-24 ENCOUNTER — LAB VISIT (OUTPATIENT)
Dept: LAB | Facility: HOSPITAL | Age: 34
End: 2024-04-24
Attending: CLINICAL NURSE SPECIALIST
Payer: COMMERCIAL

## 2024-04-24 VITALS
WEIGHT: 293 LBS | HEIGHT: 67 IN | BODY MASS INDEX: 45.99 KG/M2 | TEMPERATURE: 100 F | HEART RATE: 95 BPM | SYSTOLIC BLOOD PRESSURE: 142 MMHG | DIASTOLIC BLOOD PRESSURE: 90 MMHG | OXYGEN SATURATION: 95 %

## 2024-04-24 DIAGNOSIS — R42 DIZZINESS: ICD-10-CM

## 2024-04-24 DIAGNOSIS — G35 MULTIPLE SCLEROSIS: ICD-10-CM

## 2024-04-24 DIAGNOSIS — E66.01 MORBID OBESITY WITH BMI OF 50.0-59.9, ADULT: Primary | ICD-10-CM

## 2024-04-24 LAB
ALBUMIN SERPL BCP-MCNC: 3.4 G/DL (ref 3.5–5.2)
ALP SERPL-CCNC: 55 U/L (ref 55–135)
ALT SERPL W/O P-5'-P-CCNC: 14 U/L (ref 10–44)
ANION GAP SERPL CALC-SCNC: 11 MMOL/L (ref 8–16)
AST SERPL-CCNC: 23 U/L (ref 10–40)
BASOPHILS # BLD AUTO: 0.04 K/UL (ref 0–0.2)
BASOPHILS NFR BLD: 0.3 % (ref 0–1.9)
BILIRUB SERPL-MCNC: 0.3 MG/DL (ref 0.1–1)
BUN SERPL-MCNC: 9 MG/DL (ref 6–20)
CALCIUM SERPL-MCNC: 9.5 MG/DL (ref 8.7–10.5)
CHLORIDE SERPL-SCNC: 104 MMOL/L (ref 95–110)
CO2 SERPL-SCNC: 23 MMOL/L (ref 23–29)
CREAT SERPL-MCNC: 0.7 MG/DL (ref 0.5–1.4)
DIFFERENTIAL METHOD BLD: ABNORMAL
EOSINOPHIL # BLD AUTO: 0.1 K/UL (ref 0–0.5)
EOSINOPHIL NFR BLD: 0.9 % (ref 0–8)
ERYTHROCYTE [DISTWIDTH] IN BLOOD BY AUTOMATED COUNT: 18.3 % (ref 11.5–14.5)
EST. GFR  (NO RACE VARIABLE): >60 ML/MIN/1.73 M^2
GLUCOSE SERPL-MCNC: 76 MG/DL (ref 70–110)
HCT VFR BLD AUTO: 37.4 % (ref 37–48.5)
HGB BLD-MCNC: 10.9 G/DL (ref 12–16)
IMM GRANULOCYTES # BLD AUTO: 0.03 K/UL (ref 0–0.04)
IMM GRANULOCYTES NFR BLD AUTO: 0.3 % (ref 0–0.5)
LYMPHOCYTES # BLD AUTO: 3.5 K/UL (ref 1–4.8)
LYMPHOCYTES NFR BLD: 30.2 % (ref 18–48)
MCH RBC QN AUTO: 21.6 PG (ref 27–31)
MCHC RBC AUTO-ENTMCNC: 29.1 G/DL (ref 32–36)
MCV RBC AUTO: 74 FL (ref 82–98)
MONOCYTES # BLD AUTO: 0.6 K/UL (ref 0.3–1)
MONOCYTES NFR BLD: 4.9 % (ref 4–15)
NEUTROPHILS # BLD AUTO: 7.4 K/UL (ref 1.8–7.7)
NEUTROPHILS NFR BLD: 63.4 % (ref 38–73)
NRBC BLD-RTO: 0 /100 WBC
PLATELET # BLD AUTO: 475 K/UL (ref 150–450)
PMV BLD AUTO: 10.6 FL (ref 9.2–12.9)
POTASSIUM SERPL-SCNC: 3.8 MMOL/L (ref 3.5–5.1)
PROT SERPL-MCNC: 7.4 G/DL (ref 6–8.4)
RBC # BLD AUTO: 5.05 M/UL (ref 4–5.4)
SODIUM SERPL-SCNC: 138 MMOL/L (ref 136–145)
WBC # BLD AUTO: 11.72 K/UL (ref 3.9–12.7)

## 2024-04-24 PROCEDURE — 3008F BODY MASS INDEX DOCD: CPT | Mod: CPTII,S$GLB,, | Performed by: NURSE PRACTITIONER

## 2024-04-24 PROCEDURE — 99213 OFFICE O/P EST LOW 20 MIN: CPT | Mod: S$GLB,,, | Performed by: NURSE PRACTITIONER

## 2024-04-24 PROCEDURE — 1160F RVW MEDS BY RX/DR IN RCRD: CPT | Mod: CPTII,S$GLB,, | Performed by: NURSE PRACTITIONER

## 2024-04-24 PROCEDURE — 3077F SYST BP >= 140 MM HG: CPT | Mod: CPTII,S$GLB,, | Performed by: NURSE PRACTITIONER

## 2024-04-24 PROCEDURE — 80053 COMPREHEN METABOLIC PANEL: CPT | Performed by: CLINICAL NURSE SPECIALIST

## 2024-04-24 PROCEDURE — 1159F MED LIST DOCD IN RCRD: CPT | Mod: CPTII,S$GLB,, | Performed by: NURSE PRACTITIONER

## 2024-04-24 PROCEDURE — 99999 PR PBB SHADOW E&M-EST. PATIENT-LVL IV: CPT | Mod: PBBFAC,,, | Performed by: NURSE PRACTITIONER

## 2024-04-24 PROCEDURE — 3080F DIAST BP >= 90 MM HG: CPT | Mod: CPTII,S$GLB,, | Performed by: NURSE PRACTITIONER

## 2024-04-24 PROCEDURE — 36415 COLL VENOUS BLD VENIPUNCTURE: CPT | Mod: PO | Performed by: CLINICAL NURSE SPECIALIST

## 2024-04-24 PROCEDURE — 85025 COMPLETE CBC W/AUTO DIFF WBC: CPT | Performed by: CLINICAL NURSE SPECIALIST

## 2024-04-24 PROCEDURE — 3044F HG A1C LEVEL LT 7.0%: CPT | Mod: CPTII,S$GLB,, | Performed by: NURSE PRACTITIONER

## 2024-04-25 ENCOUNTER — LAB VISIT (OUTPATIENT)
Dept: LAB | Facility: HOSPITAL | Age: 34
End: 2024-04-25
Attending: NURSE PRACTITIONER
Payer: COMMERCIAL

## 2024-04-25 ENCOUNTER — PATIENT MESSAGE (OUTPATIENT)
Dept: FAMILY MEDICINE | Facility: CLINIC | Age: 34
End: 2024-04-25
Payer: COMMERCIAL

## 2024-04-25 DIAGNOSIS — E66.01 MORBID OBESITY WITH BMI OF 50.0-59.9, ADULT: Primary | ICD-10-CM

## 2024-04-25 DIAGNOSIS — E66.01 MORBID OBESITY WITH BMI OF 50.0-59.9, ADULT: ICD-10-CM

## 2024-04-25 LAB
ALBUMIN SERPL BCP-MCNC: 3.6 G/DL (ref 3.5–5.2)
ALP SERPL-CCNC: 53 U/L (ref 55–135)
ALT SERPL W/O P-5'-P-CCNC: 17 U/L (ref 10–44)
ANION GAP SERPL CALC-SCNC: 11 MMOL/L (ref 8–16)
AST SERPL-CCNC: 22 U/L (ref 10–40)
BASOPHILS # BLD AUTO: 0.04 K/UL (ref 0–0.2)
BASOPHILS NFR BLD: 0.3 % (ref 0–1.9)
BILIRUB SERPL-MCNC: 0.3 MG/DL (ref 0.1–1)
BUN SERPL-MCNC: 9 MG/DL (ref 6–20)
CALCIUM SERPL-MCNC: 10 MG/DL (ref 8.7–10.5)
CHLORIDE SERPL-SCNC: 107 MMOL/L (ref 95–110)
CHOLEST SERPL-MCNC: 147 MG/DL (ref 120–199)
CHOLEST/HDLC SERPL: 4.2 {RATIO} (ref 2–5)
CO2 SERPL-SCNC: 24 MMOL/L (ref 23–29)
CREAT SERPL-MCNC: 0.8 MG/DL (ref 0.5–1.4)
DIFFERENTIAL METHOD BLD: ABNORMAL
EOSINOPHIL # BLD AUTO: 0.1 K/UL (ref 0–0.5)
EOSINOPHIL NFR BLD: 0.8 % (ref 0–8)
ERYTHROCYTE [DISTWIDTH] IN BLOOD BY AUTOMATED COUNT: 17.7 % (ref 11.5–14.5)
EST. GFR  (NO RACE VARIABLE): >60 ML/MIN/1.73 M^2
ESTIMATED AVG GLUCOSE: 111 MG/DL (ref 68–131)
GLUCOSE SERPL-MCNC: 82 MG/DL (ref 70–110)
HBA1C MFR BLD: 5.5 % (ref 4–5.6)
HCT VFR BLD AUTO: 38.1 % (ref 37–48.5)
HDLC SERPL-MCNC: 35 MG/DL (ref 40–75)
HDLC SERPL: 23.8 % (ref 20–50)
HGB BLD-MCNC: 11.1 G/DL (ref 12–16)
IMM GRANULOCYTES # BLD AUTO: 0.05 K/UL (ref 0–0.04)
IMM GRANULOCYTES NFR BLD AUTO: 0.4 % (ref 0–0.5)
LDLC SERPL CALC-MCNC: 97.8 MG/DL (ref 63–159)
LYMPHOCYTES # BLD AUTO: 4.1 K/UL (ref 1–4.8)
LYMPHOCYTES NFR BLD: 31.2 % (ref 18–48)
MCH RBC QN AUTO: 22.1 PG (ref 27–31)
MCHC RBC AUTO-ENTMCNC: 29.1 G/DL (ref 32–36)
MCV RBC AUTO: 76 FL (ref 82–98)
MONOCYTES # BLD AUTO: 0.7 K/UL (ref 0.3–1)
MONOCYTES NFR BLD: 5.4 % (ref 4–15)
NEUTROPHILS # BLD AUTO: 8.1 K/UL (ref 1.8–7.7)
NEUTROPHILS NFR BLD: 61.9 % (ref 38–73)
NONHDLC SERPL-MCNC: 112 MG/DL
NRBC BLD-RTO: 0 /100 WBC
PLATELET # BLD AUTO: 471 K/UL (ref 150–450)
PMV BLD AUTO: 10.1 FL (ref 9.2–12.9)
POTASSIUM SERPL-SCNC: 4.4 MMOL/L (ref 3.5–5.1)
PROT SERPL-MCNC: 7.7 G/DL (ref 6–8.4)
RBC # BLD AUTO: 5.03 M/UL (ref 4–5.4)
SODIUM SERPL-SCNC: 142 MMOL/L (ref 136–145)
T4 FREE SERPL-MCNC: 1.05 NG/DL (ref 0.71–1.51)
TRIGL SERPL-MCNC: 71 MG/DL (ref 30–150)
TSH SERPL DL<=0.005 MIU/L-ACNC: 1.8 UIU/ML (ref 0.4–4)
WBC # BLD AUTO: 13.06 K/UL (ref 3.9–12.7)

## 2024-04-25 PROCEDURE — 80061 LIPID PANEL: CPT | Performed by: NURSE PRACTITIONER

## 2024-04-25 PROCEDURE — 80053 COMPREHEN METABOLIC PANEL: CPT | Performed by: NURSE PRACTITIONER

## 2024-04-25 PROCEDURE — 85025 COMPLETE CBC W/AUTO DIFF WBC: CPT | Performed by: NURSE PRACTITIONER

## 2024-04-25 PROCEDURE — 83525 ASSAY OF INSULIN: CPT | Performed by: NURSE PRACTITIONER

## 2024-04-25 PROCEDURE — 84443 ASSAY THYROID STIM HORMONE: CPT | Performed by: NURSE PRACTITIONER

## 2024-04-25 PROCEDURE — 84439 ASSAY OF FREE THYROXINE: CPT | Performed by: NURSE PRACTITIONER

## 2024-04-25 PROCEDURE — 36415 COLL VENOUS BLD VENIPUNCTURE: CPT | Mod: PO | Performed by: NURSE PRACTITIONER

## 2024-04-25 PROCEDURE — 83036 HEMOGLOBIN GLYCOSYLATED A1C: CPT | Performed by: NURSE PRACTITIONER

## 2024-04-26 ENCOUNTER — PATIENT MESSAGE (OUTPATIENT)
Dept: INTERNAL MEDICINE | Facility: CLINIC | Age: 34
End: 2024-04-26
Payer: COMMERCIAL

## 2024-04-26 LAB
INSULIN COLLECTION INTERVAL: NORMAL
INSULIN SERPL-ACNC: 18.8 UU/ML

## 2024-04-27 ENCOUNTER — PATIENT MESSAGE (OUTPATIENT)
Dept: NEUROLOGY | Facility: CLINIC | Age: 34
End: 2024-04-27
Payer: COMMERCIAL

## 2024-04-29 ENCOUNTER — PATIENT MESSAGE (OUTPATIENT)
Dept: FAMILY MEDICINE | Facility: CLINIC | Age: 34
End: 2024-04-29
Payer: COMMERCIAL

## 2024-05-01 NOTE — PROGRESS NOTES
Subjective:       Patient ID: Kendy Ryder is a 33 y.o. female.    Chief Complaint: Weight Loss  Pt reports to clinic to discuss weight.  Reports she is trying to conceive.  Not currently exercising.  Diet is low in processed foods, and ETOH, notes having meat aversion.  Has exercise equipment at home.  Bp slightly elevated today, no history of HTN; history of MS; no PMH of pancreatitis, no FMH of MEN2 or MTC    Past Medical History:   Diagnosis Date    Anemia     Insulin resistance     Multiple sclerosis       Current Outpatient Medications on File Prior to Visit   Medication Sig Dispense Refill    alpha lipoic acid 100 mg Cap Take by mouth.      biotin 10,000 mcg Cap Take by mouth.      buPROPion (WELLBUTRIN XL) 150 MG TB24 tablet Take 1 tablet (150 mg total) by mouth once daily. 30 tablet 3    cholecalciferol, vitamin D3, 125 mcg (5,000 unit) Tab Take 10,000 Units by mouth once daily.      clobetasoL (TEMOVATE) 0.05 % external solution Apply topically once daily. 50 mL 1    metFORMIN (GLUCOPHAGE-XR) 500 MG ER 24hr tablet Take 500 mg by mouth 3 (three) times daily.      peginterferon beta-1a (PLEGRIDY) 125 mcg/0.5 mL Syrg INJECT 0.5 ML (125 MCG TOTAL) INTO THE MUSCLE EVERY 14 (FOURTEEN) DAYS. 1 mL 2     Current Facility-Administered Medications on File Prior to Visit   Medication Dose Route Frequency Provider Last Rate Last Admin    triamcinolone acetonide injection 10 mg  10 mg Intradermal 1 time in Clinic/HOD Alli Linder MD        triamcinolone acetonide injection 10 mg  10 mg Intradermal 1 time in Clinic/HOD Alli Linder MD        triamcinolone acetonide injection 10 mg  10 mg Intradermal 1 time in Clinic/HOD Alli Linder MD          HPI  Review of Systems   Constitutional:  Positive for activity change, appetite change and unexpected weight change.   HENT: Negative.     Respiratory: Negative.     Cardiovascular: Negative.    Gastrointestinal: Negative.    Genitourinary: Negative.     Musculoskeletal: Negative.    Skin: Negative.    Neurological: Negative.    Psychiatric/Behavioral: Negative.         Objective:      Physical Exam  Vitals reviewed.   Constitutional:       Appearance: She is well-developed. She is obese.   HENT:      Head: Normocephalic.      Mouth/Throat:      Pharynx: No oropharyngeal exudate.   Eyes:      Pupils: Pupils are equal, round, and reactive to light.   Neck:      Vascular: No JVD.      Trachea: No tracheal deviation.   Cardiovascular:      Rate and Rhythm: Normal rate and regular rhythm.      Heart sounds: Normal heart sounds. No murmur heard.  Pulmonary:      Effort: Pulmonary effort is normal. No respiratory distress.      Breath sounds: Normal breath sounds.   Abdominal:      General: Bowel sounds are normal. There is no distension.      Palpations: Abdomen is soft.      Tenderness: There is no abdominal tenderness.   Musculoskeletal:         General: Normal range of motion.      Cervical back: Normal range of motion.   Skin:     General: Skin is warm and dry.   Neurological:      Mental Status: She is alert and oriented to person, place, and time.      Deep Tendon Reflexes: Reflexes are normal and symmetric.   Psychiatric:         Speech: Speech normal.         Behavior: Behavior normal.         Assessment:       1. Morbid obesity with BMI of 50.0-59.9, adult        Plan:   Morbid obesity with BMI of 50.0-59.9, adult  -     CBC Auto Differential; Future; Expected date: 04/24/2024  -     Comprehensive Metabolic Panel; Future; Expected date: 04/24/2024  -     Lipid Panel; Future; Expected date: 04/24/2024  -     Hemoglobin A1C; Future; Expected date: 04/24/2024  -     TSH; Future; Expected date: 04/24/2024  -     T4, FREE; Future; Expected date: 04/24/2024  -     INSULIN, RANDOM; Future; Expected date: 04/24/2024    Discussed a caloric deficit as well as beginning an exercise routine.  Pt will verify insurance coverage of GLP1; discussed s/e effects.  Will follow  up in 4 weeks; review of record shows multiple elevated BP readings.  Will refrain from diagnosing with HTN at this time.  With goal of weight reduction and lifestyle interventions to treat first     No follow-ups on file.

## 2024-05-02 ENCOUNTER — PATIENT MESSAGE (OUTPATIENT)
Dept: PSYCHIATRY | Facility: CLINIC | Age: 34
End: 2024-05-02
Payer: COMMERCIAL

## 2024-05-02 ENCOUNTER — TELEPHONE (OUTPATIENT)
Dept: BARIATRICS | Facility: CLINIC | Age: 34
End: 2024-05-02
Payer: COMMERCIAL

## 2024-05-09 ENCOUNTER — OFFICE VISIT (OUTPATIENT)
Dept: DERMATOLOGY | Facility: CLINIC | Age: 34
End: 2024-05-09
Payer: COMMERCIAL

## 2024-05-09 VITALS — BODY MASS INDEX: 45.99 KG/M2 | WEIGHT: 293 LBS | HEIGHT: 67 IN

## 2024-05-09 DIAGNOSIS — L65.9 HAIR LOSS: Primary | ICD-10-CM

## 2024-05-09 PROCEDURE — 3008F BODY MASS INDEX DOCD: CPT | Mod: CPTII,S$GLB,, | Performed by: STUDENT IN AN ORGANIZED HEALTH CARE EDUCATION/TRAINING PROGRAM

## 2024-05-09 PROCEDURE — 99214 OFFICE O/P EST MOD 30 MIN: CPT | Mod: 25,S$GLB,, | Performed by: STUDENT IN AN ORGANIZED HEALTH CARE EDUCATION/TRAINING PROGRAM

## 2024-05-09 PROCEDURE — 99999 PR PBB SHADOW E&M-EST. PATIENT-LVL III: CPT | Mod: PBBFAC,,, | Performed by: STUDENT IN AN ORGANIZED HEALTH CARE EDUCATION/TRAINING PROGRAM

## 2024-05-09 PROCEDURE — 1159F MED LIST DOCD IN RCRD: CPT | Mod: CPTII,S$GLB,, | Performed by: STUDENT IN AN ORGANIZED HEALTH CARE EDUCATION/TRAINING PROGRAM

## 2024-05-09 PROCEDURE — 3044F HG A1C LEVEL LT 7.0%: CPT | Mod: CPTII,S$GLB,, | Performed by: STUDENT IN AN ORGANIZED HEALTH CARE EDUCATION/TRAINING PROGRAM

## 2024-05-09 PROCEDURE — 11900 INJECT SKIN LESIONS </W 7: CPT | Mod: S$GLB,,, | Performed by: STUDENT IN AN ORGANIZED HEALTH CARE EDUCATION/TRAINING PROGRAM

## 2024-05-09 PROCEDURE — 1160F RVW MEDS BY RX/DR IN RCRD: CPT | Mod: CPTII,S$GLB,, | Performed by: STUDENT IN AN ORGANIZED HEALTH CARE EDUCATION/TRAINING PROGRAM

## 2024-05-09 RX ORDER — MINOXIDIL 2.5 MG/1
TABLET ORAL
Qty: 30 TABLET | Refills: 2 | Status: SHIPPED | OUTPATIENT
Start: 2024-05-09

## 2024-05-09 NOTE — PROGRESS NOTES
Subjective:       Patient ID:  Kendy Ryder is a 33 y.o. female who presents for No chief complaint on file.    History of Present Illness: The patient presents for follow up of hair loss, last seen on 2/14/24 had ILK injections again and using topical clobetasol. Symptoms stable, a little regrowth noted but not much. Would like repeat injections today. No other concerning rash or skin lesions.         Review of Systems   Constitutional:  Negative for fever and chills.   Skin:  Negative for itching, rash and dry skin.        Objective:    Physical Exam   Constitutional: She appears well-developed and well-nourished. No distress.   Neurological: She is alert and oriented to person, place, and time. She is not disoriented.   Psychiatric: She has a normal mood and affect.   Skin:   Areas Examined (abnormalities noted in diagram):   Scalp / Hair Palpated and Inspected  Head / Face Inspection Performed  Neck Inspection Performed              Diagram Legend     Erythematous scaling macule/papule c/w actinic keratosis       Vascular papule c/w angioma      Pigmented verrucoid papule/plaque c/w seborrheic keratosis      Yellow umbilicated papule c/w sebaceous hyperplasia      Irregularly shaped tan macule c/w lentigo     1-2 mm smooth white papules consistent with Milia      Movable subcutaneous cyst with punctum c/w epidermal inclusion cyst      Subcutaneous movable cyst c/w pilar cyst      Firm pink to brown papule c/w dermatofibroma      Pedunculated fleshy papule(s) c/w skin tag(s)      Evenly pigmented macule c/w junctional nevus     Mildly variegated pigmented, slightly irregular-bordered macule c/w mildly atypical nevus      Flesh colored to evenly pigmented papule c/w intradermal nevus       Pink pearly papule/plaque c/w basal cell carcinoma      Erythematous hyperkeratotic cursted plaque c/w SCC      Surgical scar with no sign of skin cancer recurrence      Open and closed comedones      Inflammatory  papules and pustules      Verrucoid papule consistent consistent with wart     Erythematous eczematous patches and plaques     Dystrophic onycholytic nail with subungual debris c/w onychomycosis     Umbilicated papule    Erythematous-base heme-crusted tan verrucoid plaque consistent with inflamed seborrheic keratosis     Erythematous Silvery Scaling Plaque c/w Psoriasis     See annotation      Assessment / Plan:        Hair loss - stable. Injection #4 today. Also discussed adding oral medication to regimen; after discussion of side effects, risks/benefits, will start with low dose oral minoxidil.   -     minoxidiL (LONITEN) 2.5 MG tablet; Take 1/4th tablet (0.625mg) by mouth daily.  Dispense: 30 tablet; Refill: 2  -     triamcinolone acetonide injection 10 mg    Intralesional Kenalog 5mg/cc (4 cc total) injected into <7 lesions on the scalp today after obtaining verbal consent including risk of surrounding hypopigmentation. Patient tolerated procedure well.    NDC for Kenalog 10mg/cc:  0390-2631-86           Follow up in about 10 weeks (around 7/18/2024).

## 2024-06-04 DIAGNOSIS — F41.9 ANXIETY: ICD-10-CM

## 2024-06-05 RX ORDER — BUPROPION HYDROCHLORIDE 150 MG/1
150 TABLET ORAL
Qty: 90 TABLET | Refills: 2 | Status: SHIPPED | OUTPATIENT
Start: 2024-06-05

## 2024-06-07 ENCOUNTER — OFFICE VISIT (OUTPATIENT)
Dept: FAMILY MEDICINE | Facility: CLINIC | Age: 34
End: 2024-06-07
Payer: COMMERCIAL

## 2024-06-07 VITALS
TEMPERATURE: 97 F | SYSTOLIC BLOOD PRESSURE: 130 MMHG | BODY MASS INDEX: 45.99 KG/M2 | DIASTOLIC BLOOD PRESSURE: 80 MMHG | HEIGHT: 67 IN | WEIGHT: 293 LBS

## 2024-06-07 DIAGNOSIS — E66.01 MORBID OBESITY WITH BMI OF 60.0-69.9, ADULT: Primary | ICD-10-CM

## 2024-06-07 PROCEDURE — 3079F DIAST BP 80-89 MM HG: CPT | Mod: CPTII,S$GLB,, | Performed by: NURSE PRACTITIONER

## 2024-06-07 PROCEDURE — 3044F HG A1C LEVEL LT 7.0%: CPT | Mod: CPTII,S$GLB,, | Performed by: NURSE PRACTITIONER

## 2024-06-07 PROCEDURE — 99213 OFFICE O/P EST LOW 20 MIN: CPT | Mod: S$GLB,,, | Performed by: NURSE PRACTITIONER

## 2024-06-07 PROCEDURE — 1159F MED LIST DOCD IN RCRD: CPT | Mod: CPTII,S$GLB,, | Performed by: NURSE PRACTITIONER

## 2024-06-07 PROCEDURE — 1160F RVW MEDS BY RX/DR IN RCRD: CPT | Mod: CPTII,S$GLB,, | Performed by: NURSE PRACTITIONER

## 2024-06-07 PROCEDURE — 99999 PR PBB SHADOW E&M-EST. PATIENT-LVL IV: CPT | Mod: PBBFAC,,, | Performed by: NURSE PRACTITIONER

## 2024-06-07 PROCEDURE — 3008F BODY MASS INDEX DOCD: CPT | Mod: CPTII,S$GLB,, | Performed by: NURSE PRACTITIONER

## 2024-06-07 PROCEDURE — 3075F SYST BP GE 130 - 139MM HG: CPT | Mod: CPTII,S$GLB,, | Performed by: NURSE PRACTITIONER

## 2024-06-07 NOTE — PROGRESS NOTES
Subjective:       Patient ID: Kendy Ryder is a 33 y.o. female.    Chief Complaint: Follow-up  Pt reports to clinic for follow up evaluation.  Was initiated on GLP1 4 weeks ago.  Pt reports well tolerated.  Mild nausea.  Has been adhering to low carb diet, using peloton for 20mins 3days a week; otherwise walking on treadmill for 0.5 mile.  She is also logging meals in radha and increased protein intake.      Past Medical History:   Diagnosis Date    Anemia     Insulin resistance     Multiple sclerosis       Current Outpatient Medications on File Prior to Visit   Medication Sig Dispense Refill    alpha lipoic acid 100 mg Cap Take by mouth.      biotin 10,000 mcg Cap Take by mouth.      buPROPion (WELLBUTRIN XL) 150 MG TB24 tablet TAKE 1 TABLET BY MOUTH EVERY DAY 90 tablet 2    cholecalciferol, vitamin D3, 125 mcg (5,000 unit) Tab Take 10,000 Units by mouth once daily.      clobetasoL (TEMOVATE) 0.05 % external solution Apply topically once daily. 50 mL 1    metFORMIN (GLUCOPHAGE-XR) 500 MG ER 24hr tablet Take 500 mg by mouth 3 (three) times daily.      minoxidiL (LONITEN) 2.5 MG tablet Take 1/4th tablet (0.625mg) by mouth daily. 30 tablet 2    peginterferon beta-1a (PLEGRIDY) 125 mcg/0.5 mL Syrg INJECT 0.5 ML (125 MCG TOTAL) INTO THE MUSCLE EVERY 14 (FOURTEEN) DAYS. 1 mL 2    [DISCONTINUED] semaglutide, weight loss, 0.25 mg/0.5 mL PnIj Inject 0.25 mg into the skin every 7 days. 3 mL 1     Current Facility-Administered Medications on File Prior to Visit   Medication Dose Route Frequency Provider Last Rate Last Admin    triamcinolone acetonide injection 10 mg  10 mg Intradermal 1 time in Clinic/HOD Alli Linder MD        triamcinolone acetonide injection 10 mg  10 mg Intradermal 1 time in Clinic/Alli Olmedo MD        triamcinolone acetonide injection 10 mg  10 mg Intradermal 1 time in Clinic/HOD Alli Linder MD        triamcinolone acetonide injection 10 mg  10 mg Intradermal 1 time in  Clinic/HOD           Follow-up  Pertinent negatives include no arthralgias, chest pain, headaches, joint swelling, neck pain, vomiting or weakness.     Review of Systems   Constitutional:  Positive for activity change. Negative for unexpected weight change.   HENT:  Negative for hearing loss, rhinorrhea and trouble swallowing.    Eyes:  Negative for discharge and visual disturbance.   Respiratory:  Negative for chest tightness and wheezing.    Cardiovascular:  Negative for chest pain and palpitations.   Gastrointestinal:  Negative for blood in stool, constipation, diarrhea and vomiting.   Endocrine: Negative for polydipsia and polyuria.   Genitourinary:  Negative for difficulty urinating, dysuria, hematuria and menstrual problem.   Musculoskeletal:  Negative for arthralgias, joint swelling and neck pain.   Neurological:  Negative for weakness and headaches.   Psychiatric/Behavioral:  Negative for confusion and dysphoric mood.        Objective:      Physical Exam  Constitutional:       Appearance: She is obese.   HENT:      Head: Normocephalic.      Right Ear: External ear normal.      Left Ear: External ear normal.   Eyes:      Extraocular Movements: Extraocular movements intact.   Cardiovascular:      Rate and Rhythm: Normal rate.   Pulmonary:      Effort: Pulmonary effort is normal. No respiratory distress.   Skin:     Coloration: Skin is not jaundiced.   Neurological:      Mental Status: She is alert and oriented to person, place, and time.   Psychiatric:         Behavior: Behavior normal.         Assessment:       1. Morbid obesity with BMI of 60.0-69.9, adult        Plan:   Morbid obesity with BMI of 60.0-69.9, adult  -     semaglutide, weight loss, 0.5 mg/0.5 mL PnIj; Inject 0.5 mg into the skin every 7 days.  Dispense: 3 mL; Refill: 1  -16lbs in 4 weeks.  Pt has done an amazing job making dietary changes and increasing physical activity.  Will increase dose.  Pt encouraged to continue lifestyle changes.   Nurse visit in 4 weeks; follow up in 3 months    No follow-ups on file.

## 2024-07-01 DIAGNOSIS — G35 MULTIPLE SCLEROSIS: ICD-10-CM

## 2024-07-02 ENCOUNTER — LAB VISIT (OUTPATIENT)
Dept: LAB | Facility: HOSPITAL | Age: 34
End: 2024-07-02
Attending: CLINICAL NURSE SPECIALIST
Payer: COMMERCIAL

## 2024-07-02 DIAGNOSIS — G35 MULTIPLE SCLEROSIS: ICD-10-CM

## 2024-07-02 LAB
BASOPHILS # BLD AUTO: 0.04 K/UL (ref 0–0.2)
BASOPHILS NFR BLD: 0.3 % (ref 0–1.9)
DIFFERENTIAL METHOD BLD: ABNORMAL
EOSINOPHIL # BLD AUTO: 0.1 K/UL (ref 0–0.5)
EOSINOPHIL NFR BLD: 0.8 % (ref 0–8)
ERYTHROCYTE [DISTWIDTH] IN BLOOD BY AUTOMATED COUNT: 19.1 % (ref 11.5–14.5)
HCT VFR BLD AUTO: 35.8 % (ref 37–48.5)
HGB BLD-MCNC: 10.4 G/DL (ref 12–16)
IMM GRANULOCYTES # BLD AUTO: 0.04 K/UL (ref 0–0.04)
IMM GRANULOCYTES NFR BLD AUTO: 0.3 % (ref 0–0.5)
LYMPHOCYTES # BLD AUTO: 3.3 K/UL (ref 1–4.8)
LYMPHOCYTES NFR BLD: 23.7 % (ref 18–48)
MCH RBC QN AUTO: 20.8 PG (ref 27–31)
MCHC RBC AUTO-ENTMCNC: 29.1 G/DL (ref 32–36)
MCV RBC AUTO: 72 FL (ref 82–98)
MONOCYTES # BLD AUTO: 0.8 K/UL (ref 0.3–1)
MONOCYTES NFR BLD: 5.4 % (ref 4–15)
NEUTROPHILS # BLD AUTO: 9.7 K/UL (ref 1.8–7.7)
NEUTROPHILS NFR BLD: 69.5 % (ref 38–73)
NRBC BLD-RTO: 0 /100 WBC
PLATELET # BLD AUTO: 520 K/UL (ref 150–450)
PMV BLD AUTO: 10.6 FL (ref 9.2–12.9)
RBC # BLD AUTO: 4.99 M/UL (ref 4–5.4)
WBC # BLD AUTO: 13.97 K/UL (ref 3.9–12.7)

## 2024-07-02 PROCEDURE — 36415 COLL VENOUS BLD VENIPUNCTURE: CPT | Mod: PO | Performed by: CLINICAL NURSE SPECIALIST

## 2024-07-02 PROCEDURE — 85025 COMPLETE CBC W/AUTO DIFF WBC: CPT | Performed by: CLINICAL NURSE SPECIALIST

## 2024-07-03 RX ORDER — PEGINTERFERON BETA-1A 125 UG/.5ML
INJECTION, SOLUTION INTRAMUSCULAR
Qty: 1 ML | Refills: 1 | Status: SHIPPED | OUTPATIENT
Start: 2024-07-03

## 2024-07-08 ENCOUNTER — TELEPHONE (OUTPATIENT)
Dept: FAMILY MEDICINE | Facility: CLINIC | Age: 34
End: 2024-07-08
Payer: COMMERCIAL

## 2024-07-08 NOTE — TELEPHONE ENCOUNTER
----- Message from Veronica Hernandez sent at 7/8/2024  7:32 AM CDT -----  Contact: Kendy  Type:  Sooner Apoointment Request    Caller is requesting a sooner appointment. Caller will not accept being placed on the waitlist and is requesting a message be sent to doctor.  Name of Caller:Kendy  When is the first available appointment?scheduled 7/8/24  Symptoms:Weigh in  Would the patient rather a call back or a response via [facility]? Call or [facility]  Best Call Back Number:[phone]   Additional Information: Patient request to reschedule visit for Friday after 1 pm. Please give patient a call back or send message to [facility].   Thank you,  GH

## 2024-07-12 ENCOUNTER — CLINICAL SUPPORT (OUTPATIENT)
Dept: FAMILY MEDICINE | Facility: CLINIC | Age: 34
End: 2024-07-12
Payer: COMMERCIAL

## 2024-07-12 VITALS — BODY MASS INDEX: 54.35 KG/M2 | WEIGHT: 293 LBS

## 2024-07-12 DIAGNOSIS — E66.01 MORBID OBESITY WITH BMI OF 50.0-59.9, ADULT: Primary | ICD-10-CM

## 2024-07-12 PROCEDURE — 99999 PR PBB SHADOW E&M-EST. PATIENT-LVL I: CPT | Mod: PBBFAC,,,

## 2024-07-25 ENCOUNTER — PATIENT MESSAGE (OUTPATIENT)
Dept: PSYCHIATRY | Facility: CLINIC | Age: 34
End: 2024-07-25
Payer: COMMERCIAL

## 2024-07-26 ENCOUNTER — PATIENT MESSAGE (OUTPATIENT)
Dept: FAMILY MEDICINE | Facility: CLINIC | Age: 34
End: 2024-07-26
Payer: COMMERCIAL

## 2024-07-26 DIAGNOSIS — E66.01 MORBID OBESITY WITH BMI OF 50.0-59.9, ADULT: ICD-10-CM

## 2024-07-26 DIAGNOSIS — E66.01 MORBID OBESITY WITH BMI OF 50.0-59.9, ADULT: Primary | ICD-10-CM

## 2024-07-29 RX ORDER — SEMAGLUTIDE 1 MG/.5ML
INJECTION, SOLUTION SUBCUTANEOUS
Refills: 1 | OUTPATIENT
Start: 2024-07-29

## 2024-07-29 NOTE — TELEPHONE ENCOUNTER
Have patient call insurance.  Medication was sent back saying it isn't covered.  She has been on the medication for 2 months

## 2024-08-02 ENCOUNTER — HOSPITAL ENCOUNTER (OUTPATIENT)
Dept: RADIOLOGY | Facility: HOSPITAL | Age: 34
Discharge: HOME | End: 2024-08-02
Attending: CLINICAL NURSE SPECIALIST
Payer: COMMERCIAL

## 2024-08-02 DIAGNOSIS — G35 MULTIPLE SCLEROSIS: ICD-10-CM

## 2024-08-02 PROCEDURE — A9585 GADOBUTROL INJECTION: HCPCS | Performed by: CLINICAL NURSE SPECIALIST

## 2024-08-02 PROCEDURE — 25500020 PHARM REV CODE 255: Performed by: CLINICAL NURSE SPECIALIST

## 2024-08-02 PROCEDURE — 70553 MRI BRAIN STEM W/O & W/DYE: CPT | Mod: TC

## 2024-08-02 PROCEDURE — 70553 MRI BRAIN STEM W/O & W/DYE: CPT | Mod: 26,,, | Performed by: RADIOLOGY

## 2024-08-02 RX ORDER — GADOBUTROL 604.72 MG/ML
10 INJECTION INTRAVENOUS
Status: COMPLETED | OUTPATIENT
Start: 2024-08-02 | End: 2024-08-02

## 2024-08-02 RX ADMIN — GADOBUTROL 10 ML: 604.72 INJECTION INTRAVENOUS at 02:08

## 2024-08-05 ENCOUNTER — PATIENT MESSAGE (OUTPATIENT)
Dept: PSYCHIATRY | Facility: CLINIC | Age: 34
End: 2024-08-05
Payer: COMMERCIAL

## 2024-08-19 ENCOUNTER — TELEPHONE (OUTPATIENT)
Dept: NEUROLOGY | Facility: CLINIC | Age: 34
End: 2024-08-19
Payer: COMMERCIAL

## 2024-08-21 ENCOUNTER — LAB VISIT (OUTPATIENT)
Dept: LAB | Facility: HOSPITAL | Age: 34
End: 2024-08-21
Attending: CLINICAL NURSE SPECIALIST
Payer: COMMERCIAL

## 2024-08-21 DIAGNOSIS — G35 MULTIPLE SCLEROSIS: ICD-10-CM

## 2024-08-21 LAB
BASOPHILS # BLD AUTO: 0.04 K/UL (ref 0–0.2)
BASOPHILS NFR BLD: 0.2 % (ref 0–1.9)
DIFFERENTIAL METHOD BLD: ABNORMAL
EOSINOPHIL # BLD AUTO: 0.2 K/UL (ref 0–0.5)
EOSINOPHIL NFR BLD: 0.9 % (ref 0–8)
ERYTHROCYTE [DISTWIDTH] IN BLOOD BY AUTOMATED COUNT: 18.1 % (ref 11.5–14.5)
HCT VFR BLD AUTO: 35.8 % (ref 37–48.5)
HGB BLD-MCNC: 10.5 G/DL (ref 12–16)
IMM GRANULOCYTES # BLD AUTO: 0.08 K/UL (ref 0–0.04)
IMM GRANULOCYTES NFR BLD AUTO: 0.5 % (ref 0–0.5)
LYMPHOCYTES # BLD AUTO: 4.6 K/UL (ref 1–4.8)
LYMPHOCYTES NFR BLD: 26.9 % (ref 18–48)
MCH RBC QN AUTO: 20.6 PG (ref 27–31)
MCHC RBC AUTO-ENTMCNC: 29.3 G/DL (ref 32–36)
MCV RBC AUTO: 70 FL (ref 82–98)
MONOCYTES # BLD AUTO: 0.9 K/UL (ref 0.3–1)
MONOCYTES NFR BLD: 5 % (ref 4–15)
NEUTROPHILS # BLD AUTO: 11.5 K/UL (ref 1.8–7.7)
NEUTROPHILS NFR BLD: 66.5 % (ref 38–73)
NRBC BLD-RTO: 0 /100 WBC
PLATELET # BLD AUTO: 587 K/UL (ref 150–450)
PMV BLD AUTO: 10.7 FL (ref 9.2–12.9)
RBC # BLD AUTO: 5.09 M/UL (ref 4–5.4)
WBC # BLD AUTO: 17.24 K/UL (ref 3.9–12.7)

## 2024-08-21 PROCEDURE — 36415 COLL VENOUS BLD VENIPUNCTURE: CPT | Mod: PO | Performed by: CLINICAL NURSE SPECIALIST

## 2024-08-21 PROCEDURE — 85025 COMPLETE CBC W/AUTO DIFF WBC: CPT | Performed by: CLINICAL NURSE SPECIALIST

## 2024-08-22 ENCOUNTER — TELEPHONE (OUTPATIENT)
Dept: NEUROLOGY | Facility: CLINIC | Age: 34
End: 2024-08-22
Payer: COMMERCIAL

## 2024-08-22 NOTE — TELEPHONE ENCOUNTER
----- Message from Ayesha Hannon RN sent at 8/21/2024  9:49 AM CDT -----  Regarding: FW: PHARMACY  Contact: Phone: [phone] Fax: [phone]    ----- Message -----  From: Valeria Mart  Sent: 8/21/2024   9:35 AM CDT  To: Sudheer Sampson Staff  Subject: PHARMACY                                         Rx Refill/Request     Is this a Refill or New Rx:  REFILL      Rx Name and Strength:  PLEGRIDY 125 mcg/0.5 mL Syrg     Preferred Pharmacy with phone number:    [place], [place] - [address] [address]  Phone: [phone] Fax: [phone]       Communication Preference:     Additional Information:  prior authorization

## 2024-08-27 ENCOUNTER — PATIENT MESSAGE (OUTPATIENT)
Dept: NEUROLOGY | Facility: CLINIC | Age: 34
End: 2024-08-27
Payer: COMMERCIAL

## 2024-08-27 DIAGNOSIS — G35 MULTIPLE SCLEROSIS: ICD-10-CM

## 2024-08-27 DIAGNOSIS — D72.829 LEUKOCYTOSIS, UNSPECIFIED TYPE: Primary | ICD-10-CM

## 2024-08-27 DIAGNOSIS — D64.9 ANEMIA, UNSPECIFIED TYPE: ICD-10-CM

## 2024-08-29 DIAGNOSIS — D72.829 LEUKOCYTOSIS, UNSPECIFIED TYPE: Primary | ICD-10-CM

## 2024-08-30 ENCOUNTER — OFFICE VISIT (OUTPATIENT)
Dept: FAMILY MEDICINE | Facility: CLINIC | Age: 34
End: 2024-08-30
Payer: COMMERCIAL

## 2024-08-30 VITALS
SYSTOLIC BLOOD PRESSURE: 130 MMHG | WEIGHT: 293 LBS | BODY MASS INDEX: 45.99 KG/M2 | HEIGHT: 67 IN | DIASTOLIC BLOOD PRESSURE: 80 MMHG | TEMPERATURE: 98 F

## 2024-08-30 DIAGNOSIS — E66.01 MORBID OBESITY WITH BMI OF 50.0-59.9, ADULT: Primary | ICD-10-CM

## 2024-08-30 PROCEDURE — 99999 PR PBB SHADOW E&M-EST. PATIENT-LVL IV: CPT | Mod: PBBFAC,,, | Performed by: NURSE PRACTITIONER

## 2024-08-30 RX ORDER — SEMAGLUTIDE 1.7 MG/.75ML
1.7 INJECTION, SOLUTION SUBCUTANEOUS
Qty: 0.75 ML | Refills: 1 | Status: SHIPPED | OUTPATIENT
Start: 2024-08-30

## 2024-08-30 NOTE — PROGRESS NOTES
Subjective:       Patient ID: Kendy Ryder is a 33 y.o. female.    Chief Complaint: Follow-up    History of Present Illness    Patient presents today for follow up. She reports significant weight loss progress, having lost 43 lbs in four months since late April or early May. She is currently taking Ozempic 1mg for weight management. Her ultimate goal is to improve her overall health rather than reaching a specific weight target. She expresses satisfaction with her progress and is ready to increase to the next dosage of Ozempic. She has implemented several dietary changes, focusing on portion control and increasing her protein intake, aiming to consume at least 100 g of protein daily. She has become more mindful of her food choices, often opting for higher protein options and moderating her carbohydrate intake. She has also started tracking her food intake and weighing her food to ensure accurate portion sizes. Her exercise routine consists of using the [employer] 3 days a week (Monday, Wednesday, Friday) and going for walks on Thursdays, totaling 4-5 days per week of exercise. She reduced from 5 days to avoid burnout. She denies any significant side effects from Ozempic, reporting only occasional mild nausea (not requiring intervention) and infrequent diarrhea (approximately once a month, typically the day after her medication injection). She proactively consumes Greek yogurt nightly to prevent constipation and reports no constipation thus far. She plans to start trying to conceive in January. She and her partner have taken the current year off to prepare themselves. She intends to consult with doctors to ensure both she and her partner are in good health before attempting conception.    ROS:  General: -fever, -chills, -fatigue, -weight gain, +weight loss  Eyes: -vision changes, -redness, -discharge  ENT: -ear pain, -nasal congestion, -sore throat  Cardiovascular: -chest pain, -palpitations, -lower extremity  edema  Respiratory: -cough, -shortness of breath  Gastrointestinal: -abdominal pain, +nausea, -vomiting, +diarrhea, -constipation, -blood in stool  Genitourinary: -dysuria, -hematuria, -frequency  Musculoskeletal: -joint pain, -muscle pain  Skin: -rash, -lesion  Neurological: -headache, -dizziness, -numbness, -tingling  Psychiatric: -anxiety, -depression, -sleep difficulty        Past Medical History:   Diagnosis Date    Anemia     Insulin resistance     Multiple sclerosis       Current Outpatient Medications on File Prior to Visit   Medication Sig Dispense Refill    alpha lipoic acid 100 mg Cap Take by mouth.      biotin 10,000 mcg Cap Take by mouth.      buPROPion (WELLBUTRIN XL) 150 MG TB24 tablet TAKE 1 TABLET BY MOUTH EVERY DAY 90 tablet 2    cholecalciferol, vitamin D3, 125 mcg (5,000 unit) Tab Take 10,000 Units by mouth once daily.      clobetasoL (TEMOVATE) 0.05 % external solution Apply topically once daily. 50 mL 1    metFORMIN (GLUCOPHAGE-XR) 500 MG ER 24hr tablet Take 500 mg by mouth 3 (three) times daily.      minoxidiL (LONITEN) 2.5 MG tablet Take 1/4th tablet (0.625mg) by mouth daily. 30 tablet 2    PLEGRIDY 125 mcg/0.5 mL Syrg INJECT 0.5 ML (125 MCG TOTAL) INTO THE MUSCLE EVERY 14 (FOURTEEN) DAYS. 1 mL 1    [DISCONTINUED] semaglutide, weight loss, 1 mg/0.5 mL PnIj Inject 1 mg into the skin every 7 days. 3 mL 1     Current Facility-Administered Medications on File Prior to Visit   Medication Dose Route Frequency Provider Last Rate Last Admin    triamcinolone acetonide injection 10 mg  10 mg Intradermal 1 time in Clinic/HOD Alli Linder MD        triamcinolone acetonide injection 10 mg  10 mg Intradermal 1 time in Clinic/HOD Alli Linder MD        triamcinolone acetonide injection 10 mg  10 mg Intradermal 1 time in Clinic/HOD Alli Linder MD        triamcinolone acetonide injection 10 mg  10 mg Intradermal 1 time in Clinic/HOD               Objective:      Physical Exam    General: In  no acute distress.  Head: Normocephalic. Non traumatic.  Eyes: PERRLA. EOMs full. Conjunctivae clear. Fundi grossly normal.  Ears: EACs clear. TMs normal.  Nose: Mucosa pink. Mucosa moist. No obstruction.  Throat: Clear. No exudates. No lesions.  Neck: Supple. No masses. No thyromegaly. No bruits.  Chest: Lungs clear. No rales. No rhonchi. No wheezes.  Heart: RRR. No murmurs. No rubs. No gallops.  Abdomen: Soft. No tenderness. No masses. BS normal.  : Normal external genitalia. No lesions. No discharge. No hernias  noted.  Back: Normal curvature. No scoliosis. No tenderness.  Extremities: Warm. Well perfused. No upper extremity edema. No lower extremity edema. FROM. No deformities. No joint erythema.  Neuro: No focal deficits appreciated. Good muscle tone. Normal response to visual stimuli. Normal response to auditory stimuli.  Skin: Normal. No rashes. No lesions noted.          Assessment/Plan:     1. Morbid obesity with BMI of 50.0-59.9, adult       Assessment & Plan    WEIGHT MANAGEMENT:  - Patient achieved significant weight loss of 43 lbs in 4 months on current Ozempic regimen.  - Considered increasing Ozempic dosage to 1.7 mg to continue weight loss progress.  - Patient to continue current exercise routine of 4-5 days per week, including Peloton and walks.  - Patient to maintain focus on protein intake, aiming for at least 100 g per day.  - Increased Ozempic to 1.7 mg.  - Continued current 1 mg Ozempic until supply is finished.  FAMILY PLANNING:  - Recommend discontinuation of Ozempic 2 months prior to planned conception attempt in January.  - Discontinue Ozempic in November (2 months before planned conception attempt in January).               No follow-ups on file.    This note was generated with the assistance of ambient listening technology. Verbal consent was obtained by the patient and accompanying visitor(s) for the recording of patient appointment to facilitate this note. I attest to having reviewed  and edited the generated note for accuracy, though some syntax or spelling errors may persist. Please contact the author of this note for any clarification.

## 2024-09-03 ENCOUNTER — OFFICE VISIT (OUTPATIENT)
Dept: DERMATOLOGY | Facility: CLINIC | Age: 34
End: 2024-09-03
Payer: COMMERCIAL

## 2024-09-03 DIAGNOSIS — L65.9 HAIR LOSS: Primary | ICD-10-CM

## 2024-09-03 PROCEDURE — 1160F RVW MEDS BY RX/DR IN RCRD: CPT | Mod: CPTII,S$GLB,, | Performed by: STUDENT IN AN ORGANIZED HEALTH CARE EDUCATION/TRAINING PROGRAM

## 2024-09-03 PROCEDURE — G2211 COMPLEX E/M VISIT ADD ON: HCPCS | Mod: S$GLB,,, | Performed by: STUDENT IN AN ORGANIZED HEALTH CARE EDUCATION/TRAINING PROGRAM

## 2024-09-03 PROCEDURE — 1159F MED LIST DOCD IN RCRD: CPT | Mod: CPTII,S$GLB,, | Performed by: STUDENT IN AN ORGANIZED HEALTH CARE EDUCATION/TRAINING PROGRAM

## 2024-09-03 PROCEDURE — 99999 PR PBB SHADOW E&M-EST. PATIENT-LVL III: CPT | Mod: PBBFAC,,, | Performed by: STUDENT IN AN ORGANIZED HEALTH CARE EDUCATION/TRAINING PROGRAM

## 2024-09-03 PROCEDURE — 3044F HG A1C LEVEL LT 7.0%: CPT | Mod: CPTII,S$GLB,, | Performed by: STUDENT IN AN ORGANIZED HEALTH CARE EDUCATION/TRAINING PROGRAM

## 2024-09-03 PROCEDURE — 99212 OFFICE O/P EST SF 10 MIN: CPT | Mod: S$GLB,,, | Performed by: STUDENT IN AN ORGANIZED HEALTH CARE EDUCATION/TRAINING PROGRAM

## 2024-09-03 NOTE — PROGRESS NOTES
Subjective:       Patient ID:  Kendy Ryder is a 33 y.o. female who presents for   Chief Complaint   Patient presents with    Hair Loss     F/u. Reports things are slow and steady. Reports hair dressed has noted some regrowth.  Denies tenderness, itching, flaking in scalp.      History of Present Illness: The patient presents for follow up of hair loss, last seen on 5/9/24 where she had the scalp treat again with ILK injections and has been using clobetasol. At last visit, she was prescribed oral minoxidil, but has yet started. Reports that hair growth and loss are stable. Denies any major regrowth or any further loss.       Hair Loss        Review of Systems   Constitutional:  Negative for fever and chills.   Skin:  Negative for itching, rash and dry skin.        Objective:    Physical Exam   Constitutional: She appears well-developed and well-nourished. No distress.   Neurological: She is alert and oriented to person, place, and time. She is not disoriented.   Psychiatric: She has a normal mood and affect.   Skin:   Areas Examined (abnormalities noted in diagram):   Scalp / Hair Palpated and Inspected  Head / Face Inspection Performed  Neck Inspection Performed              Diagram Legend     Erythematous scaling macule/papule c/w actinic keratosis       Vascular papule c/w angioma      Pigmented verrucoid papule/plaque c/w seborrheic keratosis      Yellow umbilicated papule c/w sebaceous hyperplasia      Irregularly shaped tan macule c/w lentigo     1-2 mm smooth white papules consistent with Milia      Movable subcutaneous cyst with punctum c/w epidermal inclusion cyst      Subcutaneous movable cyst c/w pilar cyst      Firm pink to brown papule c/w dermatofibroma      Pedunculated fleshy papule(s) c/w skin tag(s)      Evenly pigmented macule c/w junctional nevus     Mildly variegated pigmented, slightly irregular-bordered macule c/w mildly atypical nevus      Flesh colored to evenly pigmented papule  c/w intradermal nevus       Pink pearly papule/plaque c/w basal cell carcinoma      Erythematous hyperkeratotic cursted plaque c/w SCC      Surgical scar with no sign of skin cancer recurrence      Open and closed comedones      Inflammatory papules and pustules      Verrucoid papule consistent consistent with wart     Erythematous eczematous patches and plaques     Dystrophic onycholytic nail with subungual debris c/w onychomycosis     Umbilicated papule    Erythematous-base heme-crusted tan verrucoid plaque consistent with inflamed seborrheic keratosis     Erythematous Silvery Scaling Plaque c/w Psoriasis     See annotation      Assessment / Plan:        Hair loss - symptoms stable; has not yet started oral minoxidil. Will start today at 1.25mg daily (re-discussed side effects of cardiovascular and excessive hair growth). Patient okay to holding on ILK injections today. Continue clobetasol topically.              Follow up in about 3 months (around 12/3/2024).

## 2024-09-09 ENCOUNTER — OFFICE VISIT (OUTPATIENT)
Dept: NEUROLOGY | Facility: CLINIC | Age: 34
End: 2024-09-09
Payer: COMMERCIAL

## 2024-09-09 VITALS
WEIGHT: 293 LBS | BODY MASS INDEX: 45.99 KG/M2 | SYSTOLIC BLOOD PRESSURE: 149 MMHG | HEIGHT: 67 IN | DIASTOLIC BLOOD PRESSURE: 82 MMHG | HEART RATE: 87 BPM

## 2024-09-09 DIAGNOSIS — Z71.89 COUNSELING REGARDING GOALS OF CARE: ICD-10-CM

## 2024-09-09 DIAGNOSIS — Z29.89 PROPHYLACTIC IMMUNOTHERAPY: ICD-10-CM

## 2024-09-09 DIAGNOSIS — G35 MULTIPLE SCLEROSIS: Primary | ICD-10-CM

## 2024-09-09 PROCEDURE — 3044F HG A1C LEVEL LT 7.0%: CPT | Mod: CPTII,S$GLB,, | Performed by: CLINICAL NURSE SPECIALIST

## 2024-09-09 PROCEDURE — 3008F BODY MASS INDEX DOCD: CPT | Mod: CPTII,S$GLB,, | Performed by: CLINICAL NURSE SPECIALIST

## 2024-09-09 PROCEDURE — 99214 OFFICE O/P EST MOD 30 MIN: CPT | Mod: S$GLB,,, | Performed by: CLINICAL NURSE SPECIALIST

## 2024-09-09 PROCEDURE — G2211 COMPLEX E/M VISIT ADD ON: HCPCS | Mod: S$GLB,,, | Performed by: CLINICAL NURSE SPECIALIST

## 2024-09-09 PROCEDURE — 99999 PR PBB SHADOW E&M-EST. PATIENT-LVL III: CPT | Mod: PBBFAC,,, | Performed by: CLINICAL NURSE SPECIALIST

## 2024-09-09 PROCEDURE — 1159F MED LIST DOCD IN RCRD: CPT | Mod: CPTII,S$GLB,, | Performed by: CLINICAL NURSE SPECIALIST

## 2024-09-09 PROCEDURE — 3077F SYST BP >= 140 MM HG: CPT | Mod: CPTII,S$GLB,, | Performed by: CLINICAL NURSE SPECIALIST

## 2024-09-09 PROCEDURE — 3079F DIAST BP 80-89 MM HG: CPT | Mod: CPTII,S$GLB,, | Performed by: CLINICAL NURSE SPECIALIST

## 2024-09-12 ENCOUNTER — TELEPHONE (OUTPATIENT)
Dept: NEUROLOGY | Facility: CLINIC | Age: 34
End: 2024-09-12
Payer: COMMERCIAL

## 2024-09-12 NOTE — TELEPHONE ENCOUNTER
Spoke with pt in regards to getting her scheduled for labs. Pt has already completed labs on 9/10/24.

## 2024-09-17 ENCOUNTER — TELEPHONE (OUTPATIENT)
Dept: NEUROLOGY | Facility: CLINIC | Age: 34
End: 2024-09-17
Payer: COMMERCIAL

## 2024-09-17 DIAGNOSIS — E67.3 HYPERVITAMINOSIS D: Primary | ICD-10-CM

## 2024-09-18 DIAGNOSIS — G35 MULTIPLE SCLEROSIS: ICD-10-CM

## 2024-09-18 RX ORDER — PEGINTERFERON BETA-1A 125 UG/.5ML
INJECTION, SOLUTION INTRAMUSCULAR
Qty: 3 ML | Refills: 0 | Status: SHIPPED | OUTPATIENT
Start: 2024-09-18

## 2024-09-24 ENCOUNTER — TELEPHONE (OUTPATIENT)
Dept: NEUROLOGY | Facility: CLINIC | Age: 34
End: 2024-09-24
Payer: COMMERCIAL

## 2024-09-24 NOTE — TELEPHONE ENCOUNTER
----- Message from Yumiko Betancourt RN sent at 9/24/2024 12:30 PM CDT -----  Regarding: FW: Refill  Contact: [phone]    ----- Message -----  From: Priyanka Sal  Sent: 9/24/2024  12:22 PM CDT  To: Heriberto SWENSON Staff  Subject: Refill                                           Is this a Refill or New Rx (Script/Out of Refills):  Refill     Name of Caller: Sarahi conroy [facility]    Rx Name: peginterferon beta-1a (PLEGRIDY) 125 mcg/0.5 mL Syrg    Pharmacy Name:   [address] [address]  Phone: [phone] Fax: [phone]    Additional Information: Sarahi conroy [facility] is calling requesting a 90 day supply of medication to maximize plan benefits.

## 2024-09-26 ENCOUNTER — PATIENT MESSAGE (OUTPATIENT)
Dept: NEUROLOGY | Facility: CLINIC | Age: 34
End: 2024-09-26
Payer: COMMERCIAL

## 2024-09-27 ENCOUNTER — TELEPHONE (OUTPATIENT)
Dept: NEUROLOGY | Facility: CLINIC | Age: 34
End: 2024-09-27
Payer: COMMERCIAL

## 2024-10-01 ENCOUNTER — TELEPHONE (OUTPATIENT)
Dept: NEUROLOGY | Facility: CLINIC | Age: 34
End: 2024-10-01
Payer: COMMERCIAL

## 2024-10-02 ENCOUNTER — DOCUMENTATION ONLY (OUTPATIENT)
Dept: NEUROLOGY | Facility: CLINIC | Age: 34
End: 2024-10-02
Payer: COMMERCIAL

## 2024-10-02 NOTE — PROGRESS NOTES
Correction from other encounter ; faxed pt records to Dr. Celso Hassan's office at [phone].

## 2024-10-16 ENCOUNTER — PATIENT MESSAGE (OUTPATIENT)
Dept: FAMILY MEDICINE | Facility: CLINIC | Age: 34
End: 2024-10-16
Payer: COMMERCIAL

## 2024-10-16 DIAGNOSIS — G35 MULTIPLE SCLEROSIS: ICD-10-CM

## 2024-10-16 DIAGNOSIS — E66.01 MORBID OBESITY WITH BMI OF 50.0-59.9, ADULT: ICD-10-CM

## 2024-10-17 NOTE — TELEPHONE ENCOUNTER
Refill Routing Note   Medication(s) are not appropriate for processing by [facility] for the following reason(s):        Non-participating provider    [facility] action(s):  Route               Appointments  past 12m or future 3m with PCP    Date Provider   Last Visit   8/30/2024 Sabina Palacios, NP   Next Visit   12/20/2024 Sabina Palacios, JOSELYN   ED visits in past 90 days: 0        Note composed:8:23 PM 10/16/2024

## 2024-10-18 RX ORDER — SEMAGLUTIDE 1.7 MG/.75ML
1.7 INJECTION, SOLUTION SUBCUTANEOUS
Qty: 0.75 ML | Refills: 1 | Status: SHIPPED | OUTPATIENT
Start: 2024-10-18

## 2024-10-25 DIAGNOSIS — E66.01 MORBID OBESITY WITH BMI OF 50.0-59.9, ADULT: ICD-10-CM

## 2024-10-28 RX ORDER — SEMAGLUTIDE 1.7 MG/.75ML
1.7 INJECTION, SOLUTION SUBCUTANEOUS
Qty: 3 ML | Refills: 1 | Status: SHIPPED | OUTPATIENT
Start: 2024-10-28

## 2024-11-15 ENCOUNTER — PATIENT MESSAGE (OUTPATIENT)
Dept: NEUROLOGY | Facility: CLINIC | Age: 34
End: 2024-11-15
Payer: COMMERCIAL

## 2024-11-25 DIAGNOSIS — G35 MULTIPLE SCLEROSIS: ICD-10-CM

## 2024-11-25 RX ORDER — PEGINTERFERON BETA-1A 125 UG/.5ML
INJECTION, SOLUTION INTRAMUSCULAR
Qty: 3 ML | Refills: 0 | Status: SHIPPED | OUTPATIENT
Start: 2024-11-25

## 2024-12-03 ENCOUNTER — OFFICE VISIT (OUTPATIENT)
Dept: FAMILY MEDICINE | Facility: CLINIC | Age: 34
End: 2024-12-03
Payer: COMMERCIAL

## 2024-12-03 ENCOUNTER — PATIENT MESSAGE (OUTPATIENT)
Dept: FAMILY MEDICINE | Facility: CLINIC | Age: 34
End: 2024-12-03

## 2024-12-03 VITALS
OXYGEN SATURATION: 99 % | HEART RATE: 110 BPM | DIASTOLIC BLOOD PRESSURE: 80 MMHG | WEIGHT: 293 LBS | BODY MASS INDEX: 49.07 KG/M2 | TEMPERATURE: 98 F | SYSTOLIC BLOOD PRESSURE: 138 MMHG

## 2024-12-03 DIAGNOSIS — G35 MS (MULTIPLE SCLEROSIS): ICD-10-CM

## 2024-12-03 DIAGNOSIS — E66.01 MORBID OBESITY WITH BMI OF 60.0-69.9, ADULT: Primary | ICD-10-CM

## 2024-12-03 DIAGNOSIS — D84.9 IMMUNOSUPPRESSION: ICD-10-CM

## 2024-12-03 PROCEDURE — 99999 PR PBB SHADOW E&M-EST. PATIENT-LVL IV: CPT | Mod: PBBFAC,,, | Performed by: NURSE PRACTITIONER

## 2024-12-03 NOTE — PROGRESS NOTES
Subjective:       Patient ID: Kendy Ryder is a 34 y.o. female.    Chief Complaint: follow up     History of Present Illness    Patient presents today for follow-up regarding weight management and preconception planning. She reports significant weight loss progress, with her BMI decreasing from 59 to 49. She experiences vomiting after consuming fatty foods, particularly beef or pork. She has identified these as triggers and now avoids beef, opting for turkey or chicken instead. She denies experiencing side effects from her current regimen. She is considering increasing medication dosage to 2.4. She reports not exercising during November due to a vacation and subsequent development of cellulitis in her leg from an MS injection. She exercised from the first to the seventh of the month before the interruption. She is just returning to normal and plans to resume her exercise routine this week. She plans to start tracking ovulation in January. She reports having an established OB/GYN for preconception care.      ROS:  General: -fever, -chills, -fatigue, -weight gain, +weight loss  Eyes: -vision changes, -redness, -discharge  ENT: -ear pain, -nasal congestion, -sore throat  Cardiovascular: -chest pain, -palpitations, -lower extremity edema  Respiratory: -cough, -shortness of breath  Gastrointestinal: -abdominal pain, -nausea, +vomiting, -diarrhea, -constipation, -blood in stool  Genitourinary: -dysuria, -hematuria, -frequency  Musculoskeletal: -joint pain, -muscle pain  Skin: -rash, -lesion  Neurological: -headache, -dizziness, -numbness, -tingling  Psychiatric: -anxiety, -depression, -sleep difficulty        Past Medical History:   Diagnosis Date    Anemia     Insulin resistance     Multiple sclerosis       Current Outpatient Medications on File Prior to Visit   Medication Sig Dispense Refill    alpha lipoic acid 100 mg Cap Take by mouth.      biotin 10,000 mcg Cap Take by mouth.      buPROPion (WELLBUTRIN XL)  150 MG TB24 tablet TAKE 1 TABLET BY MOUTH EVERY DAY 90 tablet 2    cholecalciferol, vitamin D3, 125 mcg (5,000 unit) Tab Take 10,000 Units by mouth once daily.      clobetasoL (TEMOVATE) 0.05 % external solution Apply topically once daily. 50 mL 1    metFORMIN (GLUCOPHAGE-XR) 500 MG ER 24hr tablet Take 500 mg by mouth 3 (three) times daily.      minoxidiL (LONITEN) 2.5 MG tablet Take 1/4th tablet (0.625mg) by mouth daily. 30 tablet 2    PLEGRIDY 125 mcg/0.5 mL Syrg INJECT 0.5 ML (125 MCG TOTAL) INTO THE MUSCLE EVERY 14 (FOURTEEN) DAYS. 3 mL 0    [DISCONTINUED] semaglutide, weight loss, (WEGOVY) 1.7 mg/0.75 mL PnIj INJECT 1.7 MG INTO THE SKIN EVERY 7 DAYS. 3 mL 1     Current Facility-Administered Medications on File Prior to Visit   Medication Dose Route Frequency Provider Last Rate Last Admin    triamcinolone acetonide injection 10 mg  10 mg Intradermal 1 time in Clinic/HOD Alli Linder MD        triamcinolone acetonide injection 10 mg  10 mg Intradermal 1 time in Clinic/HOD Alli Linder MD        triamcinolone acetonide injection 10 mg  10 mg Intradermal 1 time in Clinic/HOD Alli Linder MD        triamcinolone acetonide injection 10 mg  10 mg Intradermal 1 time in Clinic/HOD               Objective:      Physical Exam    General: In no acute distress.  Head: Normocephalic. Non traumatic.  Eyes: PERRLA. EOMs full. Conjunctivae clear. Fundi grossly normal.  Ears: EACs clear. TMs normal.  Nose: Mucosa pink. Mucosa moist. No obstruction.  Throat: Clear. No exudates. No lesions.  Neck: Supple. No masses. No thyromegaly. No bruits.  Chest: Lungs clear. No rales. No rhonchi. No wheezes.  Heart: RRR. No murmurs. No rubs. No gallops.  Abdomen: Soft. No tenderness. No masses. BS normal.  : Normal external genitalia. No lesions. No discharge. No hernias  noted.  Back: Normal curvature. No scoliosis. No tenderness.  Extremities: Warm. Well perfused. No upper extremity edema. No lower extremity edema. FROM.  No deformities. No joint erythema.  Neuro: No focal deficits appreciated. Good muscle tone. Normal response to visual stimuli. Normal response to auditory stimuli.  Skin: Normal. No rashes. No lesions noted.  Vitals: BMI: 49.          Assessment/Plan:     1. Morbid obesity with BMI of 60.0-69.9, adult    2. Immunosuppression    3. MS (multiple sclerosis)       Assessment & Plan    OBESITY AND WEIGHT MANAGEMENT:  - Patient has made significant progress with weight loss, BMI reduced from 59 to 49.  - Discussed increasing Wegovy dose from 1.7 mg to 2.4 mg, which is the final titration.  - Considered potential switch to Zepbound (tirzepatide) pending insurance coverage, as it may be more potent for further weight loss.  - Increased Wegovy from 1.7 mg to 2.4 mg.  - Patient to message the office if insurance approves Zepbound for potential medication switch.    DIETARY COUNSELING:  - Patient to resume exercise routine after recent break due to vacation and cellulitis.    PROCREATIVE COUNSELING:  - Agreed with patient's decision to delay conception attempts until reaching BMI of 40-42.  - Patient to continue tracking periods and start tracking ovulation days from January.  - Recommend getting preconception counseling with OB/GYN.    FOLLOW-UP:  - Follow up in April.           MS  -managed per neurology.  Continue treatment plan    Immunosupression  -stable; safety precautions will return for flu vac    No follow-ups on file.    This note was generated with the assistance of ambient listening technology. Verbal consent was obtained by the patient and accompanying visitor(s) for the recording of patient appointment to facilitate this note. I attest to having reviewed and edited the generated note for accuracy, though some syntax or spelling errors may persist. Please contact the author of this note for any clarification.

## 2024-12-12 ENCOUNTER — PATIENT MESSAGE (OUTPATIENT)
Dept: FAMILY MEDICINE | Facility: CLINIC | Age: 34
End: 2024-12-12
Payer: COMMERCIAL

## 2024-12-16 ENCOUNTER — PATIENT MESSAGE (OUTPATIENT)
Dept: PSYCHIATRY | Facility: CLINIC | Age: 34
End: 2024-12-16
Payer: COMMERCIAL

## 2024-12-17 ENCOUNTER — PATIENT MESSAGE (OUTPATIENT)
Dept: FAMILY MEDICINE | Facility: CLINIC | Age: 34
End: 2024-12-17
Payer: COMMERCIAL

## 2024-12-19 ENCOUNTER — OFFICE VISIT (OUTPATIENT)
Dept: FAMILY MEDICINE | Facility: CLINIC | Age: 34
End: 2024-12-19
Payer: COMMERCIAL

## 2024-12-19 ENCOUNTER — PATIENT MESSAGE (OUTPATIENT)
Dept: PSYCHIATRY | Facility: CLINIC | Age: 34
End: 2024-12-19
Payer: COMMERCIAL

## 2024-12-19 ENCOUNTER — LAB VISIT (OUTPATIENT)
Dept: LAB | Facility: HOSPITAL | Age: 34
End: 2024-12-19
Attending: CLINICAL NURSE SPECIALIST
Payer: COMMERCIAL

## 2024-12-19 VITALS
OXYGEN SATURATION: 99 % | DIASTOLIC BLOOD PRESSURE: 100 MMHG | HEIGHT: 67 IN | TEMPERATURE: 99 F | BODY MASS INDEX: 45.99 KG/M2 | HEART RATE: 92 BPM | WEIGHT: 293 LBS | SYSTOLIC BLOOD PRESSURE: 142 MMHG

## 2024-12-19 DIAGNOSIS — E67.3 HYPERVITAMINOSIS D: ICD-10-CM

## 2024-12-19 DIAGNOSIS — R41.840 LACK OF CONCENTRATION: Primary | ICD-10-CM

## 2024-12-19 LAB — 25(OH)D3+25(OH)D2 SERPL-MCNC: 74 NG/ML (ref 30–96)

## 2024-12-19 PROCEDURE — 3080F DIAST BP >= 90 MM HG: CPT | Mod: CPTII,S$GLB,, | Performed by: NURSE PRACTITIONER

## 2024-12-19 PROCEDURE — 82306 VITAMIN D 25 HYDROXY: CPT | Performed by: CLINICAL NURSE SPECIALIST

## 2024-12-19 PROCEDURE — 1160F RVW MEDS BY RX/DR IN RCRD: CPT | Mod: CPTII,S$GLB,, | Performed by: NURSE PRACTITIONER

## 2024-12-19 PROCEDURE — 99999 PR PBB SHADOW E&M-EST. PATIENT-LVL V: CPT | Mod: PBBFAC,,, | Performed by: NURSE PRACTITIONER

## 2024-12-19 PROCEDURE — 99213 OFFICE O/P EST LOW 20 MIN: CPT | Mod: S$GLB,,, | Performed by: NURSE PRACTITIONER

## 2024-12-19 PROCEDURE — 1159F MED LIST DOCD IN RCRD: CPT | Mod: CPTII,S$GLB,, | Performed by: NURSE PRACTITIONER

## 2024-12-19 PROCEDURE — 3075F SYST BP GE 130 - 139MM HG: CPT | Mod: CPTII,S$GLB,, | Performed by: NURSE PRACTITIONER

## 2024-12-19 PROCEDURE — 3044F HG A1C LEVEL LT 7.0%: CPT | Mod: CPTII,S$GLB,, | Performed by: NURSE PRACTITIONER

## 2024-12-19 PROCEDURE — 3008F BODY MASS INDEX DOCD: CPT | Mod: CPTII,S$GLB,, | Performed by: NURSE PRACTITIONER

## 2024-12-19 PROCEDURE — 36415 COLL VENOUS BLD VENIPUNCTURE: CPT | Mod: PO | Performed by: CLINICAL NURSE SPECIALIST

## 2024-12-31 NOTE — PROGRESS NOTES
Subjective:       Patient ID: Kendy Ryder is a 34 y.o. female.    Chief Complaint: Follow-up    History of Present Illness    Patient presents today for ADHD symptoms and medication management. She reports getting distracted easily and starting multiple tasks without completing them. She denies any previous issues during high school or college, and has not been formally diagnosed with ADHD. She continues Wellbutrin for anxiety and L-theanine, which she finds helpful. She works remotely with a flexible schedule. She typically exercises regularly but reports decreased activity recently.    ROS:  General: -fever, -chills, -fatigue, -weight gain, -weight loss  Eyes: -vision changes, -redness, -discharge  ENT: -ear pain, -nasal congestion, -sore throat  Cardiovascular: -chest pain, -palpitations, -lower extremity edema  Respiratory: -cough, -shortness of breath  Gastrointestinal: -abdominal pain, -nausea, -vomiting, -diarrhea, -constipation, -blood in stool  Genitourinary: -dysuria, -hematuria, -frequency  Musculoskeletal: -joint pain, -muscle pain  Skin: -rash, -lesion  Neurological: -headache, -dizziness, -numbness, -tingling  Psychiatric: -anxiety, -depression, -sleep difficulty        Past Medical History:   Diagnosis Date    Anemia     Insulin resistance     Multiple sclerosis       Current Outpatient Medications on File Prior to Visit   Medication Sig Dispense Refill    alpha lipoic acid 100 mg Cap Take by mouth.      biotin 10,000 mcg Cap Take by mouth.      buPROPion (WELLBUTRIN XL) 150 MG TB24 tablet TAKE 1 TABLET BY MOUTH EVERY DAY 90 tablet 2    cholecalciferol, vitamin D3, 125 mcg (5,000 unit) Tab Take 10,000 Units by mouth once daily.      clobetasoL (TEMOVATE) 0.05 % external solution Apply topically once daily. 50 mL 1    metFORMIN (GLUCOPHAGE-XR) 500 MG ER 24hr tablet Take 500 mg by mouth 3 (three) times daily.      minoxidiL (LONITEN) 2.5 MG tablet Take 1/4th tablet (0.625mg) by mouth daily. 30  tablet 2    PLEGRIDY 125 mcg/0.5 mL Syrg INJECT 0.5 ML (125 MCG TOTAL) INTO THE MUSCLE EVERY 14 (FOURTEEN) DAYS. 3 mL 0    semaglutide, weight loss, 2.4 mg/0.75 mL PnIj Inject 2.4 mg into the skin every 7 days. 3 mL 2     Current Facility-Administered Medications on File Prior to Visit   Medication Dose Route Frequency Provider Last Rate Last Admin    triamcinolone acetonide injection 10 mg  10 mg Intradermal 1 time in Clinic/HOD Alli Linder MD        triamcinolone acetonide injection 10 mg  10 mg Intradermal 1 time in Clinic/HOD Alli Linder MD        triamcinolone acetonide injection 10 mg  10 mg Intradermal 1 time in Clinic/HOD Alli Linder MD        triamcinolone acetonide injection 10 mg  10 mg Intradermal 1 time in Clinic/HOD               Objective:      Physical Exam    General: In no acute distress.  Head: Normocephalic. Non traumatic.  Eyes: PERRLA. EOMs full. Conjunctivae clear. Fundi grossly normal.  Ears: EACs clear. TMs normal.  Nose: Mucosa pink. Mucosa moist. No obstruction.  Throat: Clear. No exudates. No lesions.  Neck: Supple. No masses. No thyromegaly. No bruits.  Chest: Lungs clear. No rales. No rhonchi. No wheezes.  Heart: RRR. No murmurs. No rubs. No gallops.  Abdomen: Soft. No tenderness. No masses. BS normal.  : Normal external genitalia. No lesions. No discharge. No hernias  noted.  Back: Normal curvature. No scoliosis. No tenderness.  Extremities: Warm. Well perfused. No upper extremity edema. No lower extremity edema. FROM. No deformities. No joint erythema.  Neuro: No focal deficits appreciated. Good muscle tone. Normal response to visual stimuli. Normal response to auditory stimuli.  Skin: Normal. No rashes. No lesions noted.  Vitals: BLOOD PRESSURE IS ELEVATED.          Assessment/Plan:     1. Lack of concentration       Assessment & Plan    ATTENTION-DEFICIT HYPERACTIVITY DISORDER (ADHD):  - Considered ADHD evaluation for patient reporting difficulty maintaining  focus and easily getting distracted.  - Noted Wellbutrin's potential for ADHD treatment, but patient reports it is not helping.  - Discussed options for ADHD assessment, including referral to psychiatry or potential evaluation by patient's neurologist.  - Explained JOANNE requirements for ADHD medication in adults, including formal cognitive testing by a neuropsychologist or psychiatrist.  - Discussed potential wait times for psychiatry referral.  - Referred to psychiatry for ADHD evaluation.  - Follow up with neurologist regarding ADHD evaluation and potential medication management.  - Contact office if neurologist agrees to initiate ADHD medication, for potential ongoing management.    ANXIETY DISORDER:  - Continued Wellbutrin at current dose for anxiety.    ELEVATED BLOOD PRESSURE:  - Observed slightly elevated blood pressure, but decided to have it rechecked before taking action.               No follow-ups on file.    This note was generated with the assistance of ambient listening technology. Verbal consent was obtained by the patient and accompanying visitor(s) for the recording of patient appointment to facilitate this note. I attest to having reviewed and edited the generated note for accuracy, though some syntax or spelling errors may persist. Please contact the author of this note for any clarification.       Answers submitted by the patient for this visit:  Review of Systems Questionnaire (Submitted on 12/19/2024)  activity change: No  unexpected weight change: No  neck pain: No  hearing loss: No  rhinorrhea: No  trouble swallowing: No  eye discharge: No  visual disturbance: No  chest tightness: No  wheezing: No  chest pain: No  palpitations: No  blood in stool: No  constipation: No  vomiting: No  diarrhea: No  polydipsia: No  polyuria: No  difficulty urinating: No  hematuria: No  menstrual problem: No  dysuria: No  joint swelling: No  arthralgias: No  headaches: No  weakness: No  confusion: No  dysphoric  mood: No

## 2025-02-19 ENCOUNTER — PATIENT MESSAGE (OUTPATIENT)
Dept: FAMILY MEDICINE | Facility: CLINIC | Age: 35
End: 2025-02-19
Payer: COMMERCIAL

## 2025-02-19 DIAGNOSIS — E66.01 MORBID OBESITY WITH BMI OF 60.0-69.9, ADULT: ICD-10-CM

## 2025-02-28 ENCOUNTER — PATIENT MESSAGE (OUTPATIENT)
Dept: FAMILY MEDICINE | Facility: CLINIC | Age: 35
End: 2025-02-28
Payer: COMMERCIAL

## 2025-02-28 DIAGNOSIS — E66.01 MORBID OBESITY WITH BMI OF 60.0-69.9, ADULT: ICD-10-CM

## 2025-02-28 NOTE — TELEPHONE ENCOUNTER
To get the quantity covered [facility] is requiring it has to go through their mail service.  Pending new Rx.  Sorry for the hassle.

## 2025-03-03 ENCOUNTER — LAB VISIT (OUTPATIENT)
Dept: LAB | Facility: HOSPITAL | Age: 35
End: 2025-03-03
Attending: CLINICAL NURSE SPECIALIST
Payer: COMMERCIAL

## 2025-03-03 DIAGNOSIS — G35 MULTIPLE SCLEROSIS: ICD-10-CM

## 2025-03-03 PROCEDURE — 36415 COLL VENOUS BLD VENIPUNCTURE: CPT | Mod: PO | Performed by: CLINICAL NURSE SPECIALIST

## 2025-03-03 PROCEDURE — 85025 COMPLETE CBC W/AUTO DIFF WBC: CPT | Performed by: CLINICAL NURSE SPECIALIST

## 2025-03-04 LAB
BASOPHILS # BLD AUTO: 0.03 K/UL (ref 0–0.2)
BASOPHILS NFR BLD: 0.2 % (ref 0–1.9)
DIFFERENTIAL METHOD BLD: ABNORMAL
EOSINOPHIL # BLD AUTO: 0.1 K/UL (ref 0–0.5)
EOSINOPHIL NFR BLD: 0.8 % (ref 0–8)
ERYTHROCYTE [DISTWIDTH] IN BLOOD BY AUTOMATED COUNT: 18.6 % (ref 11.5–14.5)
HCT VFR BLD AUTO: 35.2 % (ref 37–48.5)
HGB BLD-MCNC: 9.8 G/DL (ref 12–16)
IMM GRANULOCYTES # BLD AUTO: 0.04 K/UL (ref 0–0.04)
IMM GRANULOCYTES NFR BLD AUTO: 0.3 % (ref 0–0.5)
LYMPHOCYTES # BLD AUTO: 4.1 K/UL (ref 1–4.8)
LYMPHOCYTES NFR BLD: 32.2 % (ref 18–48)
MCH RBC QN AUTO: 19.7 PG (ref 27–31)
MCHC RBC AUTO-ENTMCNC: 27.8 G/DL (ref 32–36)
MCV RBC AUTO: 71 FL (ref 82–98)
MONOCYTES # BLD AUTO: 0.6 K/UL (ref 0.3–1)
MONOCYTES NFR BLD: 4.5 % (ref 4–15)
NEUTROPHILS # BLD AUTO: 8 K/UL (ref 1.8–7.7)
NEUTROPHILS NFR BLD: 62 % (ref 38–73)
NRBC BLD-RTO: 0 /100 WBC
PLATELET # BLD AUTO: 541 K/UL (ref 150–450)
PMV BLD AUTO: 10.9 FL (ref 9.2–12.9)
RBC # BLD AUTO: 4.98 M/UL (ref 4–5.4)
WBC # BLD AUTO: 12.84 K/UL (ref 3.9–12.7)

## 2025-03-06 ENCOUNTER — RESULTS FOLLOW-UP (OUTPATIENT)
Dept: NEUROLOGY | Facility: CLINIC | Age: 35
End: 2025-03-06

## 2025-03-07 ENCOUNTER — PATIENT MESSAGE (OUTPATIENT)
Dept: PSYCHIATRY | Facility: CLINIC | Age: 35
End: 2025-03-07
Payer: COMMERCIAL

## 2025-03-10 ENCOUNTER — OFFICE VISIT (OUTPATIENT)
Dept: NEUROLOGY | Facility: CLINIC | Age: 35
End: 2025-03-10
Payer: COMMERCIAL

## 2025-03-10 VITALS
SYSTOLIC BLOOD PRESSURE: 161 MMHG | HEIGHT: 67 IN | WEIGHT: 293 LBS | BODY MASS INDEX: 45.99 KG/M2 | HEART RATE: 84 BPM | DIASTOLIC BLOOD PRESSURE: 109 MMHG

## 2025-03-10 DIAGNOSIS — Z71.89 COUNSELING REGARDING GOALS OF CARE: ICD-10-CM

## 2025-03-10 DIAGNOSIS — Z29.89 PROPHYLACTIC IMMUNOTHERAPY: ICD-10-CM

## 2025-03-10 DIAGNOSIS — G35 MS (MULTIPLE SCLEROSIS): Primary | ICD-10-CM

## 2025-03-10 PROCEDURE — 3008F BODY MASS INDEX DOCD: CPT | Mod: CPTII,S$GLB,, | Performed by: PSYCHIATRY & NEUROLOGY

## 2025-03-10 PROCEDURE — 3077F SYST BP >= 140 MM HG: CPT | Mod: CPTII,S$GLB,, | Performed by: PSYCHIATRY & NEUROLOGY

## 2025-03-10 PROCEDURE — 1160F RVW MEDS BY RX/DR IN RCRD: CPT | Mod: CPTII,S$GLB,, | Performed by: PSYCHIATRY & NEUROLOGY

## 2025-03-10 PROCEDURE — 99214 OFFICE O/P EST MOD 30 MIN: CPT | Mod: S$GLB,,, | Performed by: PSYCHIATRY & NEUROLOGY

## 2025-03-10 PROCEDURE — 1159F MED LIST DOCD IN RCRD: CPT | Mod: CPTII,S$GLB,, | Performed by: PSYCHIATRY & NEUROLOGY

## 2025-03-10 PROCEDURE — G2211 COMPLEX E/M VISIT ADD ON: HCPCS | Mod: S$GLB,,, | Performed by: PSYCHIATRY & NEUROLOGY

## 2025-03-10 PROCEDURE — 99999 PR PBB SHADOW E&M-EST. PATIENT-LVL IV: CPT | Mod: PBBFAC,,, | Performed by: PSYCHIATRY & NEUROLOGY

## 2025-03-10 PROCEDURE — 3080F DIAST BP >= 90 MM HG: CPT | Mod: CPTII,S$GLB,, | Performed by: PSYCHIATRY & NEUROLOGY

## 2025-03-10 NOTE — PROGRESS NOTES
Subjective:          Patient ID: Kendy Ryder is a 34 y.o. female who presents today for a routine clinic visit for MS.      MS HPI:  DMT: peginterferon beta-1a  Side effects from DMT? Yes - gets some body aches with are mild   Taking vitamin D3 as recommended? No -has been paused due to high level; will restart 5,000 IU/day  Had cellulitis from an ISR in November - treated with antibiotics an improved;   Feeling neurologically stable;   Has occasional dizziness (spin) - usually if she stands up too fast;  only lasts 10 minutes and is not severe;   She's lost 102 pounds - doing Wegovy and walking;    No longer trying for a baby - may start trying again in the fall;    No other issues;   She's feeling really well;   Bladder is normal;   No muscle spasms    Medications:  Current Outpatient Medications   Medication Sig    alpha lipoic acid 100 mg Cap Take by mouth.    biotin 10,000 mcg Cap Take by mouth.    buPROPion (WELLBUTRIN XL) 150 MG TB24 tablet TAKE 1 TABLET BY MOUTH EVERY DAY    cholecalciferol, vitamin D3, 125 mcg (5,000 unit) Tab Take 10,000 Units by mouth once daily.    metFORMIN (GLUCOPHAGE-XR) 500 MG ER 24hr tablet Take 500 mg by mouth 3 (three) times daily.    PLEGRIDY 125 mcg/0.5 mL Syrg INJECT 0.5 ML (125 MCG TOTAL) INTO THE MUSCLE EVERY 14 (FOURTEEN) DAYS.    semaglutide, weight loss, 2.4 mg/0.75 mL PnIj Inject 2.4 mg into the skin every 7 days.    clobetasoL (TEMOVATE) 0.05 % external solution Apply topically once daily.    minoxidiL (LONITEN) 2.5 MG tablet Take 1/4th tablet (0.625mg) by mouth daily.     Current Facility-Administered Medications   Medication Frequency    triamcinolone acetonide injection 10 mg 1 time in Clinic/HOD    triamcinolone acetonide injection 10 mg 1 time in Clinic/HOD    triamcinolone acetonide injection 10 mg 1 time in Clinic/HOD    triamcinolone acetonide injection 10 mg 1 time in Clinic/HOD       SOCIAL HISTORY  Social History[1]    Living arrangements - the  patient lives with their spouse.          3/9/2025     8:17 PM   REVIEW OF SYMPTOMS   Do you feel abnormally tired on most days? No   Do you feel you generally sleep well? Yes   Do you have difficulty controlling your bladder?  No   Do you have difficulty controlling your bowels?  No   Do you have frequent muscle cramps, tightness or spasms in your limbs?  No   Do you have new visual symptoms?  No   Do you have worsening difficulty with your memory or thinking? No   Do you have worsening symptoms of anxiety or depression?  No   For patients who walk, Do you have more difficulty walking?  No   Have you fallen since your last visit?  No   For patients who use wheelchairs: Do you have any skin wounds or breakdown? No   Do you have difficulty using your hands?  No   Do you have shooting or burning pain? No   Do you have difficulty with sexual function?  No   If you are sexually active, are you using birth control? Y/N  N/A No   Do you often choke when swallowing liquids or solid food?  No   Do you experience worsening symptoms when overheated? Yes   Do you need any new equipment such as a wheelchair, walker or shower chair? No   Do you receive co-pay financial assistance for your principal MS medicine? Yes   Would you be interested in participating in an MS research trial in the future? Yes   For patients on Gilenya, Tecfidera, Aubagio, Rituxan, Ocrevus, Tysabri, Lemtrada or Methotrexate, are you aware that you should NOT receive live virus vaccines?  Not Applicable   Do you feel you have adequate family/friend support?  Yes   Do you have health insurance?   Yes   Are you currently employed? Yes   Do you receive SSDI/SSI?  Not Applicable   Do you use marijuana or cannabis products? Yes   How often? Monthly   Have you been diagnosed with a urinary tract infection since your last visit here? No   Have you been diagnosed with a respiratory tract infection since your last visit here? No   Have you been to the emergency room  since your last visit here? Yes   Have you been hospitalized since your last visit here?  No              Objective:              9/9/2024     9:30 AM 3/10/2025     9:40 AM   Timed 25 Foot Walk:   Did patient wear an AFO? No No   Was assistive device used? No No   Time for 25 Foot Walk (seconds) 3.6 3.9   Time for 25 Foot Walk (seconds) 3.8 3.9     Neurological Exam  MENTAL STATUS: grossly intact  CRANIAL NERVE EXAM: There is no internuclear ophthalmoplegia. Extraocular   muscles are intact.  No facial   asymmetry.There is no dysarthria.   MOTOR EXAM: Normal bulk and tone throughout UE and LE bilaterally. Rapid sequential movements are normal. Strength is 5/5 in all groups   in the lower extremities and upper extremities.   REFLEXES: Symmetric and 2+ throughout in all 4 extremities.   SENSORY EXAM: Normal to vibration t/o  COORDINATION: Normal finger-to-nose exam.   GAIT: Narrow based and stable.      Imaging:     Results for orders placed during the hospital encounter of 07/24/21    MRI Brain Demyelinating Without Contrast    Impression  Findings in the cerebral white matter which are typical for multiple sclerosis.  New left occipital white matter plaque without restricted diffusion to suggest active demyelinization..      Electronically signed by: Taqueria Flores MD  Date:    07/25/2021  Time:    16:23    No results found for this or any previous visit.    No results found for this or any previous visit.    Results for orders placed during the hospital encounter of 08/02/24    MRI Brain Demyelinating W W/O Contrast    Impression  Findings in the cerebral white matter which are typical for multiple sclerosis.  No new or enhancing lesions to suggest active disease.    No acute abnormality or significant change.      Electronically signed by: Taqueria Flores MD  Date:    08/02/2024  Time:    14:51    Results for orders placed during the hospital encounter of 01/18/19    MRI Cervical Spine Demyelinating W W/O  Contrast    Impression  Unremarkable motion limited MRI of the cervical spine as detailed above      Electronically signed by: Naren Santiago DO  Date:    01/18/2019  Time:    12:53    No results found for this or any previous visit.        Labs:     Lab Results   Component Value Date    KYHQWBUG21TE 74 12/19/2024    VNSPEIPB38IV 108 (H) 09/10/2024    HMEWXMPT80LP 147 (H) 01/18/2024     Lab Results   Component Value Date    JCVINDEX 0.80 (A) 02/14/2018    JCVANTIBODY Positive (A) 02/14/2018     Lab Results   Component Value Date    IQ2VMARB 70.7 02/06/2019    ABSOLUTECD3 3070 (H) 02/06/2019    WY5PICTE 14.5 02/06/2019    ABSOLUTECD8 630 02/06/2019    FP8QNVJH 54.2 02/06/2019    ABSOLUTECD4 2350 (H) 02/06/2019    LABCD48 3.73 (H) 02/06/2019     Lab Results   Component Value Date    WBC 12.84 (H) 03/03/2025    RBC 4.98 03/03/2025    HGB 9.8 (L) 03/03/2025    HCT 35.2 (L) 03/03/2025    MCV 71 (L) 03/03/2025    MCH 19.7 (L) 03/03/2025    MCHC 27.8 (L) 03/03/2025    RDW 18.6 (H) 03/03/2025     (H) 03/03/2025    MPV 10.9 03/03/2025    GRAN 8.0 (H) 03/03/2025    GRAN 62.0 03/03/2025    LYMPH 4.1 03/03/2025    LYMPH 32.2 03/03/2025    MONO 0.6 03/03/2025    MONO 4.5 03/03/2025    EOS 0.1 03/03/2025    BASO 0.03 03/03/2025    EOSINOPHIL 0.8 03/03/2025    BASOPHIL 0.2 03/03/2025     Sodium   Date Value Ref Range Status   09/10/2024 139 136 - 145 mmol/L Final     Potassium   Date Value Ref Range Status   09/10/2024 4.5 3.5 - 5.1 mmol/L Final     Chloride   Date Value Ref Range Status   09/10/2024 108 95 - 110 mmol/L Final     CO2   Date Value Ref Range Status   09/10/2024 21 (L) 23 - 29 mmol/L Final     Glucose   Date Value Ref Range Status   09/10/2024 82 70 - 110 mg/dL Final     BUN   Date Value Ref Range Status   09/10/2024 12 6 - 20 mg/dL Final     Creatinine   Date Value Ref Range Status   09/10/2024 0.8 0.5 - 1.4 mg/dL Final     Calcium   Date Value Ref Range Status   09/10/2024 9.4 8.7 - 10.5 mg/dL Final     Total  Protein   Date Value Ref Range Status   09/10/2024 7.4 6.0 - 8.4 g/dL Final     Albumin   Date Value Ref Range Status   09/10/2024 3.3 (L) 3.5 - 5.2 g/dL Final     Total Bilirubin   Date Value Ref Range Status   09/10/2024 0.2 0.1 - 1.0 mg/dL Final     Comment:     For infants and newborns, interpretation of results should be based  on gestational age, weight and in agreement with clinical  observations.    Premature Infant recommended reference ranges:  Up to 24 hours.............<8.0 mg/dL  Up to 48 hours............<12.0 mg/dL  3-5 days..................<15.0 mg/dL  6-29 days.................<15.0 mg/dL       Alkaline Phosphatase   Date Value Ref Range Status   09/10/2024 56 55 - 135 U/L Final     AST   Date Value Ref Range Status   09/10/2024 18 10 - 40 U/L Final     ALT   Date Value Ref Range Status   09/10/2024 13 10 - 44 U/L Final     Anion Gap   Date Value Ref Range Status   09/10/2024 10 8 - 16 mmol/L Final     eGFR if    Date Value Ref Range Status   01/16/2019 >60 >60 mL/min/1.73 m^2 Final     eGFR if non    Date Value Ref Range Status   01/16/2019 >60 >60 mL/min/1.73 m^2 Final     Comment:     Calculation used to obtain the estimated glomerular filtration  rate (eGFR) is the CKD-EPI equation.        Lab Results   Component Value Date    HEPBSAG Negative 06/17/2022    HEPBSAB Positive 06/17/2022    HEPBCAB Negative 06/17/2022           MS Impression and Plan:     NEURO MULTIPLE SCLEROSIS IMPRESSION:   Number of relapses in the past year?:  0  Clinical Progression:  Clinically Stable  MRI Progression:  Stable  MS Classification:  Relapsing-Remitting MS  Current DMT: interferon beta-1a IM  DMT:  No change in management  Symptom Management:  No change in symptom management  Additional Impressions: MED  Continue Avonex;   MRI in August  F/u Steffi Mistrykin CNS after MRI           Problem List Items Addressed This Visit          Unprioritized    Counseling regarding goals of care     Prophylactic immunotherapy     Other Visit Diagnoses         MS (multiple sclerosis)    -  Primary    Relevant Orders    MRI Brain Demyelinating Without Contrast            Raquel Fall MD    I spent a total of 30 minutes on the day of the visit.This includes face to face time and non-face to face time preparing to see the patient (eg, review of tests), obtaining and/or reviewing separately obtained history, documenting clinical information in the electronic or other health record, independently interpreting results and communicating results to the patient/family/caregiver, or care coordinator.  Visit today included increased complexity associated with the care of the episodic problem : chronic immunotherapy; addressed and managing the longitudinal care of the patient due to the serious and/or complex managed problem(s) MS.           [1]   Social History  Tobacco Use    Smoking status: Never     Passive exposure: Never    Smokeless tobacco: Never   Substance Use Topics    Alcohol use: Not Currently    Drug use: No

## 2025-03-15 DIAGNOSIS — F41.9 ANXIETY: ICD-10-CM

## 2025-03-17 DIAGNOSIS — D50.9 MICROCYTIC ANEMIA: Primary | ICD-10-CM

## 2025-03-17 RX ORDER — BUPROPION HYDROCHLORIDE 150 MG/1
150 TABLET ORAL
Qty: 90 TABLET | Refills: 3 | Status: SHIPPED | OUTPATIENT
Start: 2025-03-17

## 2025-03-21 ENCOUNTER — LAB VISIT (OUTPATIENT)
Dept: LAB | Facility: HOSPITAL | Age: 35
End: 2025-03-21
Attending: INTERNAL MEDICINE
Payer: COMMERCIAL

## 2025-03-21 DIAGNOSIS — D50.9 MICROCYTIC ANEMIA: ICD-10-CM

## 2025-03-21 DIAGNOSIS — G35 MULTIPLE SCLEROSIS: ICD-10-CM

## 2025-03-21 LAB
BASOPHILS # BLD AUTO: 0.04 K/UL (ref 0–0.2)
BASOPHILS NFR BLD: 0.3 % (ref 0–1.9)
DIFFERENTIAL METHOD BLD: ABNORMAL
EOSINOPHIL # BLD AUTO: 0.1 K/UL (ref 0–0.5)
EOSINOPHIL NFR BLD: 0.9 % (ref 0–8)
ERYTHROCYTE [DISTWIDTH] IN BLOOD BY AUTOMATED COUNT: 18.1 % (ref 11.5–14.5)
FERRITIN SERPL-MCNC: 22 NG/ML (ref 20–300)
FOLATE SERPL-MCNC: 5.7 NG/ML (ref 4–24)
HCT VFR BLD AUTO: 34.4 % (ref 37–48.5)
HGB BLD-MCNC: 10 G/DL (ref 12–16)
IMM GRANULOCYTES # BLD AUTO: 0.04 K/UL (ref 0–0.04)
IMM GRANULOCYTES NFR BLD AUTO: 0.3 % (ref 0–0.5)
IRON SERPL-MCNC: 17 UG/DL (ref 30–160)
LDH SERPL L TO P-CCNC: 131 U/L (ref 110–260)
LYMPHOCYTES # BLD AUTO: 4.3 K/UL (ref 1–4.8)
LYMPHOCYTES NFR BLD: 35.6 % (ref 18–48)
MCH RBC QN AUTO: 20 PG (ref 27–31)
MCHC RBC AUTO-ENTMCNC: 29.1 G/DL (ref 32–36)
MCV RBC AUTO: 69 FL (ref 82–98)
MONOCYTES # BLD AUTO: 0.4 K/UL (ref 0.3–1)
MONOCYTES NFR BLD: 3.1 % (ref 4–15)
NEUTROPHILS # BLD AUTO: 7.2 K/UL (ref 1.8–7.7)
NEUTROPHILS NFR BLD: 59.8 % (ref 38–73)
NRBC BLD-RTO: 0 /100 WBC
PLATELET # BLD AUTO: 604 K/UL (ref 150–450)
PMV BLD AUTO: 10.4 FL (ref 9.2–12.9)
RBC # BLD AUTO: 4.99 M/UL (ref 4–5.4)
SATURATED IRON: 4 % (ref 20–50)
TOTAL IRON BINDING CAPACITY: 411 UG/DL (ref 250–450)
TRANSFERRIN SERPL-MCNC: 278 MG/DL (ref 200–375)
VIT B12 SERPL-MCNC: 429 PG/ML (ref 210–950)
WBC # BLD AUTO: 12.12 K/UL (ref 3.9–12.7)

## 2025-03-21 PROCEDURE — 82746 ASSAY OF FOLIC ACID SERUM: CPT | Performed by: INTERNAL MEDICINE

## 2025-03-21 PROCEDURE — 83615 LACTATE (LD) (LDH) ENZYME: CPT | Performed by: INTERNAL MEDICINE

## 2025-03-21 PROCEDURE — 83020 HEMOGLOBIN ELECTROPHORESIS: CPT | Mod: 91 | Performed by: INTERNAL MEDICINE

## 2025-03-21 PROCEDURE — 82607 VITAMIN B-12: CPT | Performed by: INTERNAL MEDICINE

## 2025-03-21 PROCEDURE — 84466 ASSAY OF TRANSFERRIN: CPT | Performed by: INTERNAL MEDICINE

## 2025-03-21 PROCEDURE — 82728 ASSAY OF FERRITIN: CPT | Performed by: INTERNAL MEDICINE

## 2025-03-21 PROCEDURE — 83921 ORGANIC ACID SINGLE QUANT: CPT | Performed by: INTERNAL MEDICINE

## 2025-03-21 PROCEDURE — 85025 COMPLETE CBC W/AUTO DIFF WBC: CPT | Performed by: INTERNAL MEDICINE

## 2025-03-21 PROCEDURE — 82664 ELECTROPHORETIC TEST: CPT | Performed by: INTERNAL MEDICINE

## 2025-03-21 PROCEDURE — 83068 HEMOGLOBIN UNSTABLE SCREEN: CPT | Performed by: INTERNAL MEDICINE

## 2025-03-21 PROCEDURE — 36415 COLL VENOUS BLD VENIPUNCTURE: CPT | Mod: PO | Performed by: INTERNAL MEDICINE

## 2025-03-21 PROCEDURE — 83789 MASS SPECTROMETRY QUAL/QUAN: CPT | Performed by: INTERNAL MEDICINE

## 2025-03-21 RX ORDER — PEGINTERFERON BETA-1A 125 UG/.5ML
INJECTION, SOLUTION INTRAMUSCULAR
Qty: 3 ML | Refills: 0 | Status: SHIPPED | OUTPATIENT
Start: 2025-03-21

## 2025-03-21 NOTE — TELEPHONE ENCOUNTER
----- Message from Ayesha Hannon sent at 3/20/2025 11:00 AM CDT -----  Regarding: FW: Phar called states a refill is needed on the below Rx also wants to know if 90 day supply can be prescribed    ----- Message -----  From: Lynne Taylor  Sent: 3/19/2025  11:47 AM CDT  To: Heriberto SWENSON Staff  Subject: Phar called states a refill is needed on the#    Pharmacy Calling to Clarify an RX Name of Caller# [facility] Name#[place], [place] - [address] [facility] [facility] [address]Nkpge: [phone] Fax: [phone] Name# PLEGRIDY 125 mcg/0.5 mL Syrg What do they need to clarify?Phar called states a refill is needed on the below Rx also wants to know if 90 day supply can be prescribed. Please advise  Best Call Back Number Additional Information:

## 2025-03-26 ENCOUNTER — OFFICE VISIT (OUTPATIENT)
Dept: HEMATOLOGY/ONCOLOGY | Facility: CLINIC | Age: 35
End: 2025-03-26
Payer: COMMERCIAL

## 2025-03-26 DIAGNOSIS — D50.0 IRON DEFICIENCY ANEMIA DUE TO CHRONIC BLOOD LOSS: Primary | ICD-10-CM

## 2025-03-26 LAB — HEMOGLOBIN VARIANT BY MASS SPECTROMETRY: NORMAL

## 2025-03-26 RX ORDER — DIPHENHYDRAMINE HYDROCHLORIDE 50 MG/ML
50 INJECTION, SOLUTION INTRAMUSCULAR; INTRAVENOUS ONCE AS NEEDED
OUTPATIENT
Start: 2025-03-26

## 2025-03-26 RX ORDER — HEPARIN 100 UNIT/ML
500 SYRINGE INTRAVENOUS
OUTPATIENT
Start: 2025-03-26

## 2025-03-26 RX ORDER — SODIUM CHLORIDE 0.9 % (FLUSH) 0.9 %
10 SYRINGE (ML) INJECTION
OUTPATIENT
Start: 2025-03-26

## 2025-03-26 RX ORDER — EPINEPHRINE 0.3 MG/.3ML
0.3 INJECTION SUBCUTANEOUS ONCE AS NEEDED
OUTPATIENT
Start: 2025-03-26

## 2025-03-26 NOTE — PROGRESS NOTES
[facility]   [place]  Phone: [phone];  Fax: [phone]    Patient ID: Kendy Ryder   Reason for Visit: Establish Care  MRN:  [id number]   Subjective   Kendy Ryder is a 34 y.o. female with hypertension, history of iron-deficiency anemia, multiple sclerosis and vitamin-D deficiency presents to clinic to reestablish care for management of iron-deficiency anemia.     Overall her chronic conditions are well controlled.  She does have some intermittent pain for which she uses Chet Back and Body 500 mg, 1-2 times.  She does not use any other NSAIDs, frequent steroids or other medications that might potentiate bleeding.     She does have chronic fatigue but does not endorse PICA restless legs syndrome.  She denies gross bleeding other than menstruation.  Her menstrual cycles are regular and she does not have any intermenstrual bleeding.  She does not find it appears to be particularly heavy.  I reviewed her prior endoscopies as well as her lab work.  I recommend that we proceed with IV iron as she endorses GI side effect p.o. iron.  Her diet is possibly low in iron high-risk suspect her menstrual periods may be heavier than what we think.  Otherwise she has no acute complaints and in agreement with the recommendations as outlined below    Review of Systems   Constitutional:  Positive for fatigue. Negative for appetite change, diaphoresis and fever.   Respiratory:  Negative for shortness of breath.    Cardiovascular:  Negative for chest pain.   Gastrointestinal:  Negative for anal bleeding and blood in stool.   Genitourinary:  Negative for hematuria and vaginal bleeding.   Musculoskeletal:  Positive for arthralgias.     History     Oncology History    No history exists.         Past Medical History:   Diagnosis Date    Anemia     Insulin resistance     Multiple sclerosis        Past Surgical History:   Procedure Laterality Date    COLONOSCOPY  N/A 3/21/2023    Procedure: COLONOSCOPY;  Surgeon: Radha Mckeon MD;  Location: [facility];  Service: Endoscopy;  Laterality: N/A;    DILATION AND CURETTAGE OF UTERUS      ESOPHAGOGASTRODUODENOSCOPY N/A 3/21/2023    Procedure: EGD (ESOPHAGOGASTRODUODENOSCOPY);  Surgeon: Radha Mckeon MD;  Location: [facility];  Service: Endoscopy;  Laterality: N/A;       Family History   Problem Relation Name Age of Onset    Hypertension Father Deshaun Kohli     No Known Problems Mother      Cancer Maternal Grandmother aMylin Gonzalez     Stroke Maternal Uncle Jace Gonzalez        Review of patient's allergies indicates:  No Known Allergies    Social History[1]    Labs   Labs:  No visits with results within 2 Day(s) from this visit.   Latest known visit with results is:   Lab Visit on 03/21/2025   Component Date Value Ref Range Status    WBC 03/21/2025 12.12  3.90 - 12.70 K/uL Final    RBC 03/21/2025 4.99  4.00 - 5.40 M/uL Final    Hemoglobin 03/21/2025 10.0 (L)  12.0 - 16.0 g/dL Final    Hematocrit 03/21/2025 34.4 (L)  37.0 - 48.5 % Final    MCV 03/21/2025 69 (L)  82 - 98 fL Final    MCH 03/21/2025 20.0 (L)  27.0 - 31.0 pg Final    MCHC 03/21/2025 29.1 (L)  32.0 - 36.0 g/dL Final    RDW 03/21/2025 18.1 (H)  11.5 - 14.5 % Final    Platelets 03/21/2025 604 (H)  150 - 450 K/uL Final    MPV 03/21/2025 10.4  9.2 - 12.9 fL Final    Immature Granulocytes 03/21/2025 0.3  0.0 - 0.5 % Final    Gran # (ANC) 03/21/2025 7.2  1.8 - 7.7 K/uL Final    Immature Grans (Abs) 03/21/2025 0.04  0.00 - 0.04 K/uL Final    Comment: Mild elevation in immature granulocytes is non specific and   can be seen in a variety of conditions including stress response,   acute inflammation, trauma and pregnancy. Correlation with other   laboratory and clinical findings is essential.      Lymph # 03/21/2025 4.3  1.0 - 4.8 K/uL Final    Mono # 03/21/2025 0.4  0.3 - 1.0 K/uL Final    Eos # 03/21/2025 0.1  0.0 - 0.5 K/uL Final    Baso # 03/21/2025 0.04  0.00 -  0.20 K/uL Final    nRBC 03/21/2025 0  0 /100 WBC Final    Gran % 03/21/2025 59.8  38.0 - 73.0 % Final    Lymph % 03/21/2025 35.6  18.0 - 48.0 % Final    Mono % 03/21/2025 3.1 (L)  4.0 - 15.0 % Final    Eosinophil % 03/21/2025 0.9  0.0 - 8.0 % Final    Basophil % 03/21/2025 0.3  0.0 - 1.9 % Final    Differential Method 03/21/2025 Automated   Final    Folate 03/21/2025 5.7  4.0 - 24.0 ng/mL Final    Vitamin B-12 03/21/2025 429  210 - 950 pg/mL Final    LD 03/21/2025 131  110 - 260 U/L Final    Results are increased in hemolyzed samples.    Iron 03/21/2025 17 (L)  30 - 160 ug/dL Final    Transferrin 03/21/2025 278  200 - 375 mg/dL Final    TIBC 03/21/2025 411  250 - 450 ug/dL Final    Saturated Iron 03/21/2025 4 (L)  20 - 50 % Final    Ferritin 03/21/2025 22  20.0 - 300.0 ng/mL Final    Hemoglobin Variant by Mass Spectro* 03/21/2025 See Interpretation   Final    Comment: -------------------ADDITIONAL INFORMATION-------------------  This test was developed and its performance characteristics   determined by [facility] in a manner consistent with CLIA   requirements. This test has not been cleared or approved by   the U.S. Food and Drug Administration.    Test Performed by:  [facility] [place] [address]  : Marina Leblanc Ph.D.; CLIA# [id number]      Hemoglobin, Unstable, Blood 03/21/2025 Normal   Final    Comment: -------------------REFERENCE VALUE--------------------------  Expected result is normal    -------------------ADDITIONAL INFORMATION-------------------  This test was developed and its performance characteristics   determined by [facility] in a manner consistent with CLIA   requirements. This test has not been cleared or approved by   the U.S. Food and Drug Administration.    Test Performed by:  [address] [address]  : Marina Leblanc Ph.D.; CLIA#  [id number]          Assessment and Plan   Iron Deficiency Anemia  Patient with history of iron-deficiency anemia; she describes her menstrual cycles as normal without clear history of menorrhagia in no intermenstrual bleeding.  She denies any other gross bleeding.  Prior EGD/colonoscopy 03/21/2023 showed no source of possible bleed  Iron Deficit: 1147 mg  She has tried p.o. iron but had GI side effects   Plan for IV iron administration with re-evaluation of results in 3 months  Furthermore, her B12 and folate levels were normal; thalassemia and hemoglobinopathy labs pending; per patient her insurance company would not cover alpha globin gene analysis      Med Onc Chart Routing      Follow up with physician    Follow up with [facility] 3 months.   Infusion scheduling note   Feraheme x 1 in 1 week   Injection scheduling note    Labs CBC, iron and TIBC and ferritin   Scheduling:  Preferred lab:  Lab interval:     Imaging    Pharmacy appointment    Other referrals                      The patient was seen, interviewed and examined. Pertinent lab and radiologic studies were reviewed. Pt instructed to call should they develop concerning signs/symptoms or have further questions.        Portions of the record may have been created with voice recognition software. Occasional wrong-word or sound-a-like substitutions may have occurred due to the inherent limitations of voice recognition software. Read the chart carefully and recognize, using context, where substitutions have occurred.      Kyung Tyson MD    Hematology/Oncology              [1]   Social History  Tobacco Use    Smoking status: Never     Passive exposure: Never    Smokeless tobacco: Never   Substance Use Topics    Alcohol use: Not Currently    Drug use: No

## 2025-03-27 LAB — HGB UNSTAB RBC QL: NORMAL

## 2025-03-28 LAB
HGB FRACT BLD ELPH-IMP: NORMAL
METHYLMALONATE SERPL-SCNC: 0.13 UMOL/L

## 2025-04-15 ENCOUNTER — RESULTS FOLLOW-UP (OUTPATIENT)
Dept: HEMATOLOGY/ONCOLOGY | Facility: CLINIC | Age: 35
End: 2025-04-15

## 2025-04-25 ENCOUNTER — INFUSION (OUTPATIENT)
Dept: INFUSION THERAPY | Facility: HOSPITAL | Age: 35
End: 2025-04-25
Attending: INTERNAL MEDICINE
Payer: COMMERCIAL

## 2025-04-25 VITALS
TEMPERATURE: 98 F | BODY MASS INDEX: 45.99 KG/M2 | HEIGHT: 67 IN | SYSTOLIC BLOOD PRESSURE: 144 MMHG | DIASTOLIC BLOOD PRESSURE: 84 MMHG | HEART RATE: 85 BPM | OXYGEN SATURATION: 100 % | WEIGHT: 293 LBS | RESPIRATION RATE: 18 BRPM

## 2025-04-25 DIAGNOSIS — G35 MULTIPLE SCLEROSIS: Primary | ICD-10-CM

## 2025-04-25 DIAGNOSIS — E61.1 IRON DEFICIENCY: ICD-10-CM

## 2025-04-25 PROCEDURE — 25000003 PHARM REV CODE 250: Performed by: INTERNAL MEDICINE

## 2025-04-25 PROCEDURE — 96365 THER/PROPH/DIAG IV INF INIT: CPT

## 2025-04-25 RX ORDER — FERUMOXYTOL 510 MG/17ML
1020 INJECTION INTRAVENOUS
Status: CANCELLED
Start: 2025-04-25 | End: 2025-04-25

## 2025-04-25 RX ORDER — SODIUM CHLORIDE 0.9 % (FLUSH) 0.9 %
10 SYRINGE (ML) INJECTION
Status: DISCONTINUED | OUTPATIENT
Start: 2025-04-25 | End: 2025-04-25 | Stop reason: HOSPADM

## 2025-04-25 RX ORDER — DIPHENHYDRAMINE HYDROCHLORIDE 50 MG/ML
50 INJECTION, SOLUTION INTRAMUSCULAR; INTRAVENOUS ONCE AS NEEDED
Status: DISCONTINUED | OUTPATIENT
Start: 2025-04-25 | End: 2025-04-25 | Stop reason: HOSPADM

## 2025-04-25 RX ORDER — SODIUM CHLORIDE 0.9 % (FLUSH) 0.9 %
10 SYRINGE (ML) INJECTION
OUTPATIENT
Start: 2025-04-25

## 2025-04-25 RX ORDER — HEPARIN 100 UNIT/ML
500 SYRINGE INTRAVENOUS
OUTPATIENT
Start: 2025-04-25

## 2025-04-25 RX ORDER — FERUMOXYTOL 510 MG/17ML
1020 INJECTION INTRAVENOUS
Status: CANCELLED
Start: 2025-04-25

## 2025-04-25 RX ORDER — DIPHENHYDRAMINE HYDROCHLORIDE 50 MG/ML
50 INJECTION, SOLUTION INTRAMUSCULAR; INTRAVENOUS ONCE AS NEEDED
OUTPATIENT
Start: 2025-04-25

## 2025-04-25 RX ORDER — EPINEPHRINE 0.3 MG/.3ML
0.3 INJECTION SUBCUTANEOUS ONCE AS NEEDED
OUTPATIENT
Start: 2025-04-25

## 2025-04-25 RX ORDER — FERUMOXYTOL 510 MG/17ML
1020 INJECTION INTRAVENOUS
Status: DISCONTINUED | OUTPATIENT
Start: 2025-04-25 | End: 2025-04-25

## 2025-04-25 RX ORDER — EPINEPHRINE 0.3 MG/.3ML
0.3 INJECTION SUBCUTANEOUS ONCE AS NEEDED
Status: DISCONTINUED | OUTPATIENT
Start: 2025-04-25 | End: 2025-04-25 | Stop reason: HOSPADM

## 2025-04-25 RX ADMIN — SODIUM CHLORIDE 1020 MG: 9 INJECTION, SOLUTION INTRAVENOUS at 08:04

## 2025-05-13 ENCOUNTER — PATIENT MESSAGE (OUTPATIENT)
Dept: PSYCHIATRY | Facility: CLINIC | Age: 35
End: 2025-05-13
Payer: COMMERCIAL

## 2025-05-23 ENCOUNTER — LAB VISIT (OUTPATIENT)
Dept: LAB | Facility: HOSPITAL | Age: 35
End: 2025-05-23
Attending: NURSE PRACTITIONER
Payer: COMMERCIAL

## 2025-05-23 ENCOUNTER — OFFICE VISIT (OUTPATIENT)
Dept: FAMILY MEDICINE | Facility: CLINIC | Age: 35
End: 2025-05-23
Payer: COMMERCIAL

## 2025-05-23 VITALS
HEIGHT: 67 IN | SYSTOLIC BLOOD PRESSURE: 152 MMHG | WEIGHT: 292.69 LBS | BODY MASS INDEX: 45.94 KG/M2 | TEMPERATURE: 98 F | DIASTOLIC BLOOD PRESSURE: 84 MMHG

## 2025-05-23 DIAGNOSIS — E66.01 MORBID OBESITY WITH BMI OF 60.0-69.9, ADULT: ICD-10-CM

## 2025-05-23 DIAGNOSIS — Z00.00 ANNUAL PHYSICAL EXAM: ICD-10-CM

## 2025-05-23 DIAGNOSIS — I10 PRIMARY HYPERTENSION: ICD-10-CM

## 2025-05-23 DIAGNOSIS — Z00.00 ANNUAL PHYSICAL EXAM: Primary | ICD-10-CM

## 2025-05-23 LAB
CHOLEST SERPL-MCNC: 138 MG/DL (ref 120–199)
CHOLEST/HDLC SERPL: 3.5 {RATIO} (ref 2–5)
HDLC SERPL-MCNC: 39 MG/DL (ref 40–75)
HDLC SERPL: 28.3 % (ref 20–50)
LDLC SERPL CALC-MCNC: 86.2 MG/DL (ref 63–159)
NONHDLC SERPL-MCNC: 99 MG/DL
TRIGL SERPL-MCNC: 64 MG/DL (ref 30–150)

## 2025-05-23 PROCEDURE — 83718 ASSAY OF LIPOPROTEIN: CPT

## 2025-05-23 PROCEDURE — 83036 HEMOGLOBIN GLYCOSYLATED A1C: CPT

## 2025-05-23 PROCEDURE — 99999 PR PBB SHADOW E&M-EST. PATIENT-LVL III: CPT | Mod: PBBFAC,,, | Performed by: NURSE PRACTITIONER

## 2025-05-23 PROCEDURE — 36415 COLL VENOUS BLD VENIPUNCTURE: CPT | Mod: PO

## 2025-05-23 RX ORDER — SEMAGLUTIDE 2.4 MG/.75ML
2.4 INJECTION, SOLUTION SUBCUTANEOUS
Qty: 9 ML | Refills: 1 | Status: SHIPPED | OUTPATIENT
Start: 2025-05-23

## 2025-05-23 RX ORDER — LABETALOL 100 MG/1
100 TABLET, FILM COATED ORAL 2 TIMES DAILY
Qty: 60 TABLET | Refills: 2 | Status: SHIPPED | OUTPATIENT
Start: 2025-05-23 | End: 2026-05-23

## 2025-05-23 NOTE — PROGRESS NOTES
Subjective:       Patient ID: Kendy Ryder is a 34 y.o. female.    Chief Complaint: Annual Exam    History of Present Illness    Patient presents today for follow-up of weight management She has achieved significant weight loss from 378 lbs to 292 lbs over approximately one year. She continues Wegovy 2.4mg and reports initial stomach issues resolved after switching injection site to her arms. She plans to continue treatment for three more months before discontinuing. She reports a recent decrease in exercise due to life circumstances. She has multiple sclerosis (MS) and is followed by a neurologist. She recently received an iron infusion last month and believes her iron levels are now adequate. She has family history of hypertension on paternal side. She continues metformin, labetalol, and MS medication which she reports is safe for conception. She plans to begin attempting conception within the next couple of months.      ROS:  General: -fever, -chills, -fatigue, -weight gain, -weight loss  Eyes: -vision changes, -redness, -discharge  ENT: -ear pain, -nasal congestion, -sore throat  Cardiovascular: -chest pain, -palpitations, -lower extremity edema  Respiratory: -cough, -shortness of breath  Gastrointestinal: -abdominal pain, -nausea, -vomiting, -diarrhea, -constipation, -blood in stool  Genitourinary: -dysuria, -hematuria, -frequency  Musculoskeletal: -joint pain, -muscle pain  Skin: -rash, -lesion  Neurological: -headache, -dizziness, -numbness, -tingling  Psychiatric: +anxiety, -depression, -sleep difficulty        Past Medical History:   Diagnosis Date    Anemia     Insulin resistance     Multiple sclerosis       Medications Ordered Prior to Encounter[1]       Objective:      Physical Exam    General: In no acute distress.  Head: Normocephalic. Non traumatic.  Eyes: PERRLA. EOMs full. Conjunctivae clear. Fundi grossly normal.  Ears: EACs clear. TMs normal.  Nose: Mucosa pink. Mucosa moist. No  obstruction.  Throat: Clear. No exudates. No lesions.  Neck: Supple. No masses. No thyromegaly. No bruits.  Chest: Lungs clear. No rales. No rhonchi. No wheezes.  Heart: RRR. No murmurs. No rubs. No gallops.  Abdomen: Soft. No tenderness. No masses. BS normal.  : Normal external genitalia. No lesions. No discharge. No hernias  noted.  Back: Normal curvature. No scoliosis. No tenderness.  Extremities: Warm. Well perfused. No upper extremity edema. No lower extremity edema. FROM. No deformities. No joint erythema.  Neuro: No focal deficits appreciated. Good muscle tone. Normal response to visual stimuli. Normal response to auditory stimuli.  Skin: Normal. No rashes. No lesions noted.  Vitals: WEIGHT: 292 LBS. MORBIDLY OBESE.          Assessment/Plan:     1. Annual physical exam    2. Morbid obesity with BMI of 60.0-69.9, adult    3. Primary hypertension       Assessment & Plan    ## MORBID OBESITY:  - Monitored weight progress from 378 to 292 lbs, with goal of reaching 250 lbs.  - BMI reduced to 45, progressing well toward acceptable level for conception.  - Continue Metformin at current dosage until pregnancy occurs.  - Continue Wegovy (semaglutide) at 2.4 mg for final 3 months of treatment, with plan to continue for 2 more months to reach weight loss goals before conception.  - - Advised to resume exercise routine which has decreased due to life circumstances and continue current diet plan with monitoring of sugar intake.  - Ordered labs to check cholesterol and insulin levels to track improvements with weight loss.    ## MULTIPLE SCLEROSIS:  - Monitored MS condition, which has been diagnosed for almost 10 years.  - Evaluated safety of MS medication for conception plans with neurologist and recommended continuation during conception planning.  -   ## IRON DEFICIENCY ANEMIA:  - Monitored iron status following infusion administered last month, which should have improved levels to adequate range.    - Noted that   [facility] managed iron treatment and referred patient to a hematologist.    Essential Hypertension  -labetolol 100mg bid; nurse visit in 2 weeks    ## PRECONCEPTION PLANNING:  - Recommend prenatal vitamins with fish oil and CoQ10 for egg quality improvement.  - Noted patient is considering pre-conception counseling with an obstetrician.  - Considering continuation of current psychiatric medication during pregnancy, weighing benefits vs. risks.  - Recommend incorporating yoga and continuing acupuncture for stress relief and mindfulness.              No follow-ups on file.    This note was generated with the assistance of ambient listening technology. Verbal consent was obtained by the patient and accompanying visitor(s) for the recording of patient appointment to facilitate this note. I attest to having reviewed and edited the generated note for accuracy, though some syntax or spelling errors may persist. Please contact the author of this note for any clarification.            [1]   Current Outpatient Medications on File Prior to Visit   Medication Sig Dispense Refill    alpha lipoic acid 100 mg Cap Take by mouth.      biotin 10,000 mcg Cap Take by mouth.      buPROPion (WELLBUTRIN XL) 150 MG TB24 tablet TAKE 1 TABLET BY MOUTH EVERY DAY 90 tablet 3    cholecalciferol, vitamin D3, 125 mcg (5,000 unit) Tab Take 10,000 Units by mouth once daily.      metFORMIN (GLUCOPHAGE-XR) 500 MG ER 24hr tablet Take 500 mg by mouth 3 (three) times daily.      peginterferon beta-1a (PLEGRIDY) 125 mcg/0.5 mL Syrg INJECT 0.5 ML (125 MCG TOTAL) INTO THE MUSCLE EVERY 14 (FOURTEEN) DAYS. 3 mL 0    [DISCONTINUED] semaglutide, weight loss, 2.4 mg/0.75 mL PnIj Inject 2.4 mg into the skin every 7 days. 9 mL 0    clobetasoL (TEMOVATE) 0.05 % external solution Apply topically once daily. 50 mL 1    minoxidiL (LONITEN) 2.5 MG tablet Take 1/4th tablet (0.625mg) by mouth daily. 30 tablet 2     Current Facility-Administered Medications on File Prior  None to Visit   Medication Dose Route Frequency Provider Last Rate Last Admin    triamcinolone acetonide injection 10 mg  10 mg Intradermal 1 time in Clinic/HOD Alli Linder MD        triamcinolone acetonide injection 10 mg  10 mg Intradermal 1 time in Clinic/HOD Alli Linder MD        triamcinolone acetonide injection 10 mg  10 mg Intradermal 1 time in Clinic/HOD Alli Linder MD        triamcinolone acetonide injection 10 mg  10 mg Intradermal 1 time in Clinic/HOD

## 2025-05-24 LAB
EAG (OHS): 91 MG/DL (ref 68–131)
HBA1C MFR BLD: 4.8 % (ref 4–5.6)

## 2025-05-26 ENCOUNTER — RESULTS FOLLOW-UP (OUTPATIENT)
Dept: FAMILY MEDICINE | Facility: CLINIC | Age: 35
End: 2025-05-26

## 2025-06-02 ENCOUNTER — PATIENT MESSAGE (OUTPATIENT)
Dept: FAMILY MEDICINE | Facility: CLINIC | Age: 35
End: 2025-06-02
Payer: COMMERCIAL

## 2025-06-02 DIAGNOSIS — E66.01 MORBID OBESITY WITH BMI OF 60.0-69.9, ADULT: ICD-10-CM

## 2025-06-02 RX ORDER — SEMAGLUTIDE 2.4 MG/.75ML
2.4 INJECTION, SOLUTION SUBCUTANEOUS
Qty: 9 ML | Refills: 1 | Status: SHIPPED | OUTPATIENT
Start: 2025-06-02

## 2025-06-13 ENCOUNTER — PATIENT MESSAGE (OUTPATIENT)
Dept: NEUROLOGY | Facility: CLINIC | Age: 35
End: 2025-06-13
Payer: COMMERCIAL

## 2025-06-18 ENCOUNTER — LAB VISIT (OUTPATIENT)
Dept: LAB | Facility: HOSPITAL | Age: 35
End: 2025-06-18
Payer: COMMERCIAL

## 2025-06-18 DIAGNOSIS — G35 MULTIPLE SCLEROSIS: ICD-10-CM

## 2025-06-18 LAB
ABSOLUTE EOSINOPHIL (OHS): 0.09 K/UL
ABSOLUTE MONOCYTE (OHS): 0.54 K/UL (ref 0.3–1)
ABSOLUTE NEUTROPHIL COUNT (OHS): 6.13 K/UL (ref 1.8–7.7)
ALBUMIN SERPL BCP-MCNC: 3.4 G/DL (ref 3.5–5.2)
ALP SERPL-CCNC: 72 UNIT/L (ref 40–150)
ALT SERPL W/O P-5'-P-CCNC: 12 UNIT/L (ref 10–44)
AST SERPL-CCNC: 19 UNIT/L (ref 11–45)
BASOPHILS # BLD AUTO: 0.03 K/UL
BASOPHILS NFR BLD AUTO: 0.3 %
BILIRUB DIRECT SERPL-MCNC: 0.1 MG/DL (ref 0.1–0.3)
BILIRUB SERPL-MCNC: 0.1 MG/DL (ref 0.1–1)
ERYTHROCYTE [DISTWIDTH] IN BLOOD BY AUTOMATED COUNT: 24.3 % (ref 11.5–14.5)
HCT VFR BLD AUTO: 39.4 % (ref 37–48.5)
HGB BLD-MCNC: 11.6 GM/DL (ref 12–16)
IMM GRANULOCYTES # BLD AUTO: 0.09 K/UL (ref 0–0.04)
IMM GRANULOCYTES NFR BLD AUTO: 0.9 % (ref 0–0.5)
LYMPHOCYTES # BLD AUTO: 3.49 K/UL (ref 1–4.8)
MCH RBC QN AUTO: 22.7 PG (ref 27–31)
MCHC RBC AUTO-ENTMCNC: 29.4 G/DL (ref 32–36)
MCV RBC AUTO: 77 FL (ref 82–98)
NUCLEATED RBC (/100WBC) (OHS): 0 /100 WBC
PLATELET # BLD AUTO: 432 K/UL (ref 150–450)
PMV BLD AUTO: 10.2 FL (ref 9.2–12.9)
PROT SERPL-MCNC: 6.8 GM/DL (ref 6–8.4)
RBC # BLD AUTO: 5.12 M/UL (ref 4–5.4)
RELATIVE EOSINOPHIL (OHS): 0.9 %
RELATIVE LYMPHOCYTE (OHS): 33.7 % (ref 18–48)
RELATIVE MONOCYTE (OHS): 5.2 % (ref 4–15)
RELATIVE NEUTROPHIL (OHS): 59 % (ref 38–73)
WBC # BLD AUTO: 10.37 K/UL (ref 3.9–12.7)

## 2025-06-18 PROCEDURE — 85025 COMPLETE CBC W/AUTO DIFF WBC: CPT

## 2025-06-18 PROCEDURE — 80076 HEPATIC FUNCTION PANEL: CPT

## 2025-06-18 PROCEDURE — 36415 COLL VENOUS BLD VENIPUNCTURE: CPT | Mod: PO

## 2025-06-19 ENCOUNTER — PATIENT MESSAGE (OUTPATIENT)
Dept: PSYCHIATRY | Facility: CLINIC | Age: 35
End: 2025-06-19
Payer: COMMERCIAL

## 2025-06-19 LAB
ACANTHOCYTES BLD QL SMEAR: PRESENT
ANISOCYTOSIS BLD QL SMEAR: SLIGHT
OVALOCYTES BLD QL SMEAR: NORMAL
PLATELET BLD QL SMEAR: NORMAL
POIKILOCYTOSIS BLD QL SMEAR: SLIGHT
SPHEROCYTES BLD QL SMEAR: NORMAL
TARGETS BLD QL SMEAR: NORMAL

## 2025-06-20 ENCOUNTER — TELEPHONE (OUTPATIENT)
Dept: NEUROLOGY | Facility: CLINIC | Age: 35
End: 2025-06-20
Payer: COMMERCIAL

## 2025-06-20 ENCOUNTER — RESULTS FOLLOW-UP (OUTPATIENT)
Dept: NEUROLOGY | Facility: CLINIC | Age: 35
End: 2025-06-20

## 2025-06-20 DIAGNOSIS — G35 MULTIPLE SCLEROSIS: ICD-10-CM

## 2025-06-20 RX ORDER — PEGINTERFERON BETA-1A 125 UG/.5ML
INJECTION, SOLUTION INTRAMUSCULAR
Qty: 3 ML | Refills: 1 | Status: SHIPPED | OUTPATIENT
Start: 2025-06-20

## 2025-07-15 ENCOUNTER — HOSPITAL ENCOUNTER (OUTPATIENT)
Dept: RADIOLOGY | Facility: HOSPITAL | Age: 35
Discharge: HOME | End: 2025-07-15
Attending: PSYCHIATRY & NEUROLOGY
Payer: COMMERCIAL

## 2025-07-15 DIAGNOSIS — G35 MS (MULTIPLE SCLEROSIS): ICD-10-CM

## 2025-07-15 PROCEDURE — 70551 MRI BRAIN STEM W/O DYE: CPT | Mod: 26,,, | Performed by: RADIOLOGY

## 2025-07-15 PROCEDURE — 70551 MRI BRAIN STEM W/O DYE: CPT | Mod: TC

## 2025-07-29 ENCOUNTER — DOCUMENTATION ONLY (OUTPATIENT)
Dept: NEUROLOGY | Facility: CLINIC | Age: 35
End: 2025-07-29
Payer: COMMERCIAL

## 2025-07-30 ENCOUNTER — PATIENT MESSAGE (OUTPATIENT)
Dept: PSYCHIATRY | Facility: CLINIC | Age: 35
End: 2025-07-30
Payer: COMMERCIAL

## 2025-07-30 DIAGNOSIS — D50.0 IRON DEFICIENCY ANEMIA DUE TO CHRONIC BLOOD LOSS: Primary | ICD-10-CM

## 2025-07-31 ENCOUNTER — LAB VISIT (OUTPATIENT)
Dept: LAB | Facility: HOSPITAL | Age: 35
End: 2025-07-31
Payer: COMMERCIAL

## 2025-07-31 DIAGNOSIS — D50.0 IRON DEFICIENCY ANEMIA DUE TO CHRONIC BLOOD LOSS: ICD-10-CM

## 2025-07-31 LAB
ABSOLUTE EOSINOPHIL (OHS): 0.06 K/UL
ABSOLUTE MONOCYTE (OHS): 0.44 K/UL (ref 0.3–1)
ABSOLUTE NEUTROPHIL COUNT (OHS): 5.16 K/UL (ref 1.8–7.7)
ALBUMIN SERPL BCP-MCNC: 3.5 G/DL (ref 3.5–5.2)
ALP SERPL-CCNC: 75 UNIT/L (ref 40–150)
ALT SERPL W/O P-5'-P-CCNC: 10 UNIT/L (ref 10–44)
ANION GAP (OHS): 8 MMOL/L (ref 8–16)
AST SERPL-CCNC: 21 UNIT/L (ref 11–45)
BASOPHILS # BLD AUTO: 0.04 K/UL
BASOPHILS NFR BLD AUTO: 0.4 %
BILIRUB SERPL-MCNC: 0.2 MG/DL (ref 0.1–1)
BUN SERPL-MCNC: 14 MG/DL (ref 6–20)
CALCIUM SERPL-MCNC: 8.7 MG/DL (ref 8.7–10.5)
CHLORIDE SERPL-SCNC: 106 MMOL/L (ref 95–110)
CO2 SERPL-SCNC: 25 MMOL/L (ref 23–29)
CREAT SERPL-MCNC: 0.8 MG/DL (ref 0.5–1.4)
ERYTHROCYTE [DISTWIDTH] IN BLOOD BY AUTOMATED COUNT: 17.2 % (ref 11.5–14.5)
FERRITIN SERPL-MCNC: 58 NG/ML (ref 20–300)
GFR SERPLBLD CREATININE-BSD FMLA CKD-EPI: >60 ML/MIN/1.73/M2
GLUCOSE SERPL-MCNC: 89 MG/DL (ref 70–110)
HCT VFR BLD AUTO: 41.2 % (ref 37–48.5)
HGB BLD-MCNC: 12.1 GM/DL (ref 12–16)
IMM GRANULOCYTES # BLD AUTO: 0.01 K/UL (ref 0–0.04)
IMM GRANULOCYTES NFR BLD AUTO: 0.1 % (ref 0–0.5)
IRON SATN MFR SERPL: 5 % (ref 20–50)
IRON SERPL-MCNC: 21 UG/DL (ref 30–160)
LYMPHOCYTES # BLD AUTO: 3.58 K/UL (ref 1–4.8)
MCH RBC QN AUTO: 23 PG (ref 27–31)
MCHC RBC AUTO-ENTMCNC: 29.4 G/DL (ref 32–36)
MCV RBC AUTO: 79 FL (ref 82–98)
NUCLEATED RBC (/100WBC) (OHS): 0 /100 WBC
PLATELET # BLD AUTO: 430 K/UL (ref 150–450)
PMV BLD AUTO: 10 FL (ref 9.2–12.9)
POTASSIUM SERPL-SCNC: 4.5 MMOL/L (ref 3.5–5.1)
PROT SERPL-MCNC: 7.4 GM/DL (ref 6–8.4)
RBC # BLD AUTO: 5.25 M/UL (ref 4–5.4)
RELATIVE EOSINOPHIL (OHS): 0.6 %
RELATIVE LYMPHOCYTE (OHS): 38.5 % (ref 18–48)
RELATIVE MONOCYTE (OHS): 4.7 % (ref 4–15)
RELATIVE NEUTROPHIL (OHS): 55.7 % (ref 38–73)
SODIUM SERPL-SCNC: 139 MMOL/L (ref 136–145)
TIBC SERPL-MCNC: 406 UG/DL (ref 250–450)
TRANSFERRIN SERPL-MCNC: 274 MG/DL (ref 200–375)
WBC # BLD AUTO: 9.29 K/UL (ref 3.9–12.7)

## 2025-07-31 PROCEDURE — 83540 ASSAY OF IRON: CPT

## 2025-07-31 PROCEDURE — 80053 COMPREHEN METABOLIC PANEL: CPT

## 2025-07-31 PROCEDURE — 85025 COMPLETE CBC W/AUTO DIFF WBC: CPT

## 2025-07-31 PROCEDURE — 36415 COLL VENOUS BLD VENIPUNCTURE: CPT | Mod: PO

## 2025-07-31 PROCEDURE — 82728 ASSAY OF FERRITIN: CPT

## 2025-08-01 ENCOUNTER — PATIENT MESSAGE (OUTPATIENT)
Dept: PSYCHIATRY | Facility: CLINIC | Age: 35
End: 2025-08-01
Payer: COMMERCIAL

## 2025-08-04 ENCOUNTER — OFFICE VISIT (OUTPATIENT)
Dept: HEMATOLOGY/ONCOLOGY | Facility: CLINIC | Age: 35
End: 2025-08-04
Payer: COMMERCIAL

## 2025-08-04 DIAGNOSIS — D50.0 IRON DEFICIENCY ANEMIA DUE TO CHRONIC BLOOD LOSS: Primary | ICD-10-CM

## 2025-08-04 RX ORDER — HEPARIN 100 UNIT/ML
500 SYRINGE INTRAVENOUS
OUTPATIENT
Start: 2025-08-11

## 2025-08-04 RX ORDER — EPINEPHRINE 0.3 MG/.3ML
0.3 INJECTION SUBCUTANEOUS ONCE AS NEEDED
OUTPATIENT
Start: 2025-08-11

## 2025-08-04 RX ORDER — SODIUM CHLORIDE 0.9 % (FLUSH) 0.9 %
10 SYRINGE (ML) INJECTION
OUTPATIENT
Start: 2025-08-11

## 2025-08-04 NOTE — PROGRESS NOTES
Subjective:       Patient ID: Kendy Ryder is a 34 y.o. female.    Chief Complaint: Results and Anemia    HPI: Ms. Ryder is a 34 year old female who is following up for her iron def anemia. She has been treated with iv iron feraheme, last one 4/25/25.  Intolerant to oral iron.   Pmhx: hypertension, multiple sclerosis and vitamin-D deficiency     Today: Her menstrual cycles are regular and she does not have any intermenstrual bleeding. She does not find it appears to be particularly heavy. She denies any fatigue, other bleeding, n/v/d/c. She has been taking oral iron every other day, without issues. Hesitant to take every day due to previous gi upset. Unsure if she is fatigued as she has MS, takes plegridy q 2 weeks.     Social History[1]    Past Medical History:   Diagnosis Date    Anemia     Insulin resistance     Multiple sclerosis        Family History   Problem Relation Name Age of Onset    Hypertension Father Deshaun Kohli     No Known Problems Mother      Cancer Maternal Grandmother Maylin Gonzalez     Stroke Maternal Uncle Jace Gonzalez        Past Surgical History:   Procedure Laterality Date    COLONOSCOPY N/A 3/21/2023    Procedure: COLONOSCOPY;  Surgeon: Radha Mckeon MD;  Location: [facility];  Service: Endoscopy;  Laterality: N/A;    DILATION AND CURETTAGE OF UTERUS      ESOPHAGOGASTRODUODENOSCOPY N/A 3/21/2023    Procedure: EGD (ESOPHAGOGASTRODUODENOSCOPY);  Surgeon: Radha Mckeon MD;  Location: [facility];  Service: Endoscopy;  Laterality: N/A;       Review of Systems   Constitutional:  Negative for activity change, appetite change, chills, diaphoresis, fatigue, fever and unexpected weight change.   HENT:  Negative for congestion, nosebleeds and rhinorrhea.    Respiratory:  Negative for cough and shortness of breath.    Cardiovascular:  Negative for chest pain and leg swelling.   Gastrointestinal:  Negative for abdominal pain, anal bleeding, blood in stool, constipation, diarrhea,  nausea and vomiting.   Genitourinary:  Negative for hematuria.   Neurological:  Negative for dizziness, light-headedness, numbness and headaches.         Medication List with Changes/Refills   Current Medications    ALPHA LIPOIC ACID 100 MG CAP    Take by mouth.    BIOTIN 10,000 MCG CAP    Take by mouth.    BUPROPION (WELLBUTRIN XL) 150 MG TB24 TABLET    TAKE 1 TABLET BY MOUTH EVERY DAY    CHOLECALCIFEROL, VITAMIN D3, 125 MCG (5,000 UNIT) TAB    Take 10,000 Units by mouth once daily.    CLOBETASOL (TEMOVATE) 0.05 % EXTERNAL SOLUTION    Apply topically once daily.    LABETALOL (NORMODYNE) 100 MG TABLET    Take 1 tablet (100 mg total) by mouth 2 (two) times daily.    METFORMIN (GLUCOPHAGE-XR) 500 MG ER 24HR TABLET    Take 500 mg by mouth 3 (three) times daily.    MINOXIDIL (LONITEN) 2.5 MG TABLET    Take 1/4th tablet (0.625mg) by mouth daily.    PEGINTERFERON BETA-1A (PLEGRIDY) 125 MCG/0.5 ML SYRG    INJECT 0.5 ML (125 MCG TOTAL) INTO THE MUSCLE EVERY 14 (FOURTEEN) DAYS.    SEMAGLUTIDE, WEIGHT LOSS, (WEGOVY) 2.4 MG/0.75 ML PNIJ    Inject 2.4 mg into the skin every 7 days.     Objective:   There were no vitals filed for this visit.    Physical Exam  Constitutional:       General: She is not in acute distress.     Appearance: She is not ill-appearing, toxic-appearing or diaphoretic.   Neurological:      Mental Status: She is alert.   Psychiatric:         Mood and Affect: Mood normal.          Physical exam limited due to video visit    Labs/Results:  Lab Results   Component Value Date    WBC 9.29 07/31/2025    RBC 5.25 07/31/2025    HGB 12.1 07/31/2025    HCT 41.2 07/31/2025    MCV 79 (L) 07/31/2025    MCH 23.0 (L) 07/31/2025    MCHC 29.4 (L) 07/31/2025    RDW 17.2 (H) 07/31/2025     07/31/2025    MPV 10.0 07/31/2025    GRAN 7.2 03/21/2025    GRAN 59.8 03/21/2025    LYMPH 38.5 07/31/2025    LYMPH 3.58 07/31/2025    MONO 4.7 07/31/2025    MONO 0.44 07/31/2025    EOS 0.6 07/31/2025    EOS 0.06 07/31/2025    YARA  0.04 03/21/2025    EOSINOPHIL 0.9 03/21/2025    BASOPHIL 0.4 07/31/2025    BASOPHIL 0.04 07/31/2025      Latest Reference Range & Units 07/31/25 15:32   Iron 30 - 160 ug/dL 21 (L)   TIBC 250 - 450 ug/dL 406   Transferrin 200 - 375 mg/dL 274   Ferritin 20.0 - 300.0 ng/mL 58.0   Iron Saturation 20 - 50 % 5 (L)     CMP  Sodium   Date Value Ref Range Status   07/31/2025 139 136 - 145 mmol/L Final   09/10/2024 139 136 - 145 mmol/L Final     Potassium   Date Value Ref Range Status   07/31/2025 4.5 3.5 - 5.1 mmol/L Final   09/10/2024 4.5 3.5 - 5.1 mmol/L Final     Chloride   Date Value Ref Range Status   07/31/2025 106 95 - 110 mmol/L Final   09/10/2024 108 95 - 110 mmol/L Final     CO2   Date Value Ref Range Status   07/31/2025 25 23 - 29 mmol/L Final   09/10/2024 21 (L) 23 - 29 mmol/L Final     Glucose   Date Value Ref Range Status   07/31/2025 89 70 - 110 mg/dL Final   09/10/2024 82 70 - 110 mg/dL Final     BUN   Date Value Ref Range Status   07/31/2025 14 6 - 20 mg/dL Final     Creatinine   Date Value Ref Range Status   07/31/2025 0.8 0.5 - 1.4 mg/dL Final     Calcium   Date Value Ref Range Status   07/31/2025 8.7 8.7 - 10.5 mg/dL Final   09/10/2024 9.4 8.7 - 10.5 mg/dL Final     Protein Total   Date Value Ref Range Status   07/31/2025 7.4 6.0 - 8.4 gm/dL Final     Total Protein   Date Value Ref Range Status   09/10/2024 7.4 6.0 - 8.4 g/dL Final     Albumin   Date Value Ref Range Status   07/31/2025 3.5 3.5 - 5.2 g/dL Final   09/10/2024 3.3 (L) 3.5 - 5.2 g/dL Final     Total Bilirubin   Date Value Ref Range Status   09/10/2024 0.2 0.1 - 1.0 mg/dL Final     Comment:     For infants and newborns, interpretation of results should be based  on gestational age, weight and in agreement with clinical  observations.    Premature Infant recommended reference ranges:  Up to 24 hours.............<8.0 mg/dL  Up to 48 hours............<12.0 mg/dL  3-5 days..................<15.0 mg/dL  6-29 days.................<15.0 mg/dL        Bilirubin Total   Date Value Ref Range Status   07/31/2025 0.2 0.1 - 1.0 mg/dL Final     Comment:     For infants and newborns, interpretation of results should be based   on gestational age, weight and in agreement with clinical   observations.    Premature Infant recommended reference ranges:   0-24 hours:  <8.0 mg/dL   24-48 hours: <12.0 mg/dL   3-5 days:    <15.0 mg/dL   6-29 days:   <15.0 mg/dL     Alkaline Phosphatase   Date Value Ref Range Status   09/10/2024 56 55 - 135 U/L Final     ALP   Date Value Ref Range Status   07/31/2025 75 40 - 150 unit/L Final     AST   Date Value Ref Range Status   07/31/2025 21 11 - 45 unit/L Final   09/10/2024 18 10 - 40 U/L Final     ALT   Date Value Ref Range Status   07/31/2025 10 10 - 44 unit/L Final   09/10/2024 13 10 - 44 U/L Final     Anion Gap   Date Value Ref Range Status   07/31/2025 8 8 - 16 mmol/L Final     eGFR   Date Value Ref Range Status   07/31/2025 >60 >60 mL/min/1.73/m2 Final     Comment:     Estimated GFR calculated using the CKD-EPI creatinine (2021) equation.   09/10/2024 >60.0 >60 mL/min/1.73 m^2 Final         Assessment:     Problem List Items Addressed This Visit       Iron deficiency anemia due to chronic blood loss - Primary    Relevant Orders    CBC Auto Differential    Comprehensive Metabolic Panel    Ferritin    Iron and TIBC     Plan:     Iron deficiency anemia due to chronic blood loss  --treated with iv iron feraheme, last one 4/25/25.    --Intolerant to oral iron.   --encouraged to incorporate iron rich foods into diet  --iv iron feraheme x 2 doses one week apart, steroid prior   --hgb: 12.1g/dL-improved  --iron: 21, sat: 5%, ferritin: 58-iron increased but still def    Follow-Up: iv iron feraheme x 2 doses one week apart. 5 months with cbc cmp iron/tibc ferritin prior -vv ok    Ronda Oakley PA-C  Hematology Oncology    The patient location is: home  The chief complaint leading to consultation is: iron def anemia   Visit type:   Synchronous audio video  Face to Face time with patient: minutes of total time spent on the encounter, which includes face to face time and non-face to face time preparing to see the patient (eg, review of tests), Obtaining and/or reviewing separately obtained history, Documenting clinical information in the electronic or other health record, Independently interpreting results (not separately reported) and communicating results to the patient/family/caregiver, or Care coordination (not separately reported).   Each patient to whom he or she provides medical services by telemedicine is:  (1) informed of the relationship between the provider and patient and the respective role of any other health care provider with respect to management of the patient; and (2) notified that he or she may decline to receive medical services     Route Chart for Scheduling    Med Onc Chart Routing      Follow up with physician    Follow up with [facility] . 5 months with cbc cmp iron/tibc ferritin prior -vv ok   Infusion scheduling note   iv iron feraheme x 2 doses one week apart. [facility]   Injection scheduling note    Labs CMP, ferritin, CBC and iron and TIBC   Scheduling:  Preferred lab:  Lab interval:     Imaging    Pharmacy appointment    Other referrals                  Therapy Plan Information  OP IV IRON THERAPY for Multiple sclerosis, noted on 9/6/2017  OP IV IRON THERAPY for Iron deficiency, noted on 7/25/2016  Anaphylaxis/Hypersensitivity  EPINEPHrine (EPIPEN) 0.3 mg/0.3 mL pen injection 0.3 mg  0.3 mg, Intramuscular, PRN  diphenhydrAMINE injection 50 mg  50 mg, Intravenous, PRN  hydrocortisone sodium succinate injection 100 mg  100 mg, Intravenous, PRN  Flushes  0.9% NaCl 250 mL flush bag  Intravenous, Every visit  sodium chloride 0.9% flush 10 mL  10 mL, Intravenous, Every visit  heparin, porcine (PF) 100 unit/mL injection flush 500 Units  500 Units, Intravenous, Every visit  alteplase injection 2 mg  2 mg, Intra-Catheter, Every  visit      No therapy plan of the specified type found.    No therapy plan of the specified type found.         [1]   Social History  Socioeconomic History    Marital status:    Tobacco Use    Smoking status: Never     Passive exposure: Never    Smokeless tobacco: Never   Substance and Sexual Activity    Alcohol use: Not Currently    Drug use: No    Sexual activity: Yes     Partners: Male     Birth control/protection: None     Social Drivers of Health     Financial Resource Strain: Low Risk  (3/17/2025)    Overall Financial Resource Strain (CARDIA)     Difficulty of Paying Living Expenses: Not hard at all   Food Insecurity: No Food Insecurity (3/17/2025)    Hunger Vital Sign     Worried About Running Out of Food in the Last Year: Never true     Ran Out of Food in the Last Year: Never true   Transportation Needs: No Transportation Needs (3/17/2025)    PRAPARE - Transportation     Lack of Transportation (Medical): No     Lack of Transportation (Non-Medical): No   Physical Activity: Sufficiently Active (3/17/2025)    Exercise Vital Sign     Days of Exercise per Week: 5 days     Minutes of Exercise per Session: 30 min   Stress: No Stress Concern Present (3/17/2025)    [employer] - Occupational Stress Questionnaire     Feeling of Stress : Not at all   Housing Stability: Low Risk  (3/17/2025)    Housing Stability Vital Sign     Unable to Pay for Housing in the Last Year: No     Number of Times Moved in the Last Year: 0     Homeless in the Last Year: No

## 2025-08-14 ENCOUNTER — PATIENT MESSAGE (OUTPATIENT)
Dept: FAMILY MEDICINE | Facility: CLINIC | Age: 35
End: 2025-08-14
Payer: COMMERCIAL

## 2025-08-19 ENCOUNTER — OFFICE VISIT (OUTPATIENT)
Dept: FAMILY MEDICINE | Facility: CLINIC | Age: 35
End: 2025-08-19
Payer: COMMERCIAL

## 2025-08-19 VITALS
HEIGHT: 67 IN | HEART RATE: 117 BPM | TEMPERATURE: 97 F | SYSTOLIC BLOOD PRESSURE: 138 MMHG | OXYGEN SATURATION: 100 % | WEIGHT: 293 LBS | BODY MASS INDEX: 45.99 KG/M2 | DIASTOLIC BLOOD PRESSURE: 80 MMHG

## 2025-08-19 DIAGNOSIS — E66.01 MORBID OBESITY WITH BMI OF 50.0-59.9, ADULT: Primary | ICD-10-CM

## 2025-08-19 PROCEDURE — 3079F DIAST BP 80-89 MM HG: CPT | Mod: CPTII,S$GLB,, | Performed by: NURSE PRACTITIONER

## 2025-08-19 PROCEDURE — 99999 PR PBB SHADOW E&M-EST. PATIENT-LVL IV: CPT | Mod: PBBFAC,,, | Performed by: NURSE PRACTITIONER

## 2025-08-19 PROCEDURE — 3044F HG A1C LEVEL LT 7.0%: CPT | Mod: CPTII,S$GLB,, | Performed by: NURSE PRACTITIONER

## 2025-08-19 PROCEDURE — 1159F MED LIST DOCD IN RCRD: CPT | Mod: CPTII,S$GLB,, | Performed by: NURSE PRACTITIONER

## 2025-08-19 PROCEDURE — 3008F BODY MASS INDEX DOCD: CPT | Mod: CPTII,S$GLB,, | Performed by: NURSE PRACTITIONER

## 2025-08-19 PROCEDURE — 3075F SYST BP GE 130 - 139MM HG: CPT | Mod: CPTII,S$GLB,, | Performed by: NURSE PRACTITIONER

## 2025-08-19 PROCEDURE — 99213 OFFICE O/P EST LOW 20 MIN: CPT | Mod: S$GLB,,, | Performed by: NURSE PRACTITIONER

## 2025-08-19 RX ORDER — TIRZEPATIDE 10 MG/.5ML
10 INJECTION, SOLUTION SUBCUTANEOUS
Qty: 6 ML | Refills: 0 | Status: SHIPPED | OUTPATIENT
Start: 2025-08-19

## 2025-08-20 ENCOUNTER — PATIENT MESSAGE (OUTPATIENT)
Dept: FAMILY MEDICINE | Facility: CLINIC | Age: 35
End: 2025-08-20
Payer: COMMERCIAL

## 2025-08-29 ENCOUNTER — INFUSION (OUTPATIENT)
Dept: INFUSION THERAPY | Facility: HOSPITAL | Age: 35
End: 2025-08-29
Attending: NURSE PRACTITIONER
Payer: COMMERCIAL

## 2025-08-29 VITALS
DIASTOLIC BLOOD PRESSURE: 104 MMHG | HEART RATE: 71 BPM | SYSTOLIC BLOOD PRESSURE: 175 MMHG | RESPIRATION RATE: 16 BRPM | OXYGEN SATURATION: 100 % | TEMPERATURE: 98 F

## 2025-08-29 DIAGNOSIS — E61.1 IRON DEFICIENCY: Primary | ICD-10-CM

## 2025-08-29 DIAGNOSIS — G35 MULTIPLE SCLEROSIS: ICD-10-CM

## 2025-08-29 PROCEDURE — 63600175 PHARM REV CODE 636 W HCPCS: Mod: JZ,TB

## 2025-08-29 PROCEDURE — 25000003 PHARM REV CODE 250

## 2025-08-29 PROCEDURE — 96374 THER/PROPH/DIAG INJ IV PUSH: CPT

## 2025-08-29 RX ORDER — HEPARIN 100 UNIT/ML
500 SYRINGE INTRAVENOUS
OUTPATIENT
Start: 2025-08-29

## 2025-08-29 RX ORDER — EPINEPHRINE 0.3 MG/.3ML
0.3 INJECTION SUBCUTANEOUS ONCE AS NEEDED
OUTPATIENT
Start: 2025-08-29 | End: 2037-01-24

## 2025-08-29 RX ORDER — SODIUM CHLORIDE 0.9 % (FLUSH) 0.9 %
10 SYRINGE (ML) INJECTION
OUTPATIENT
Start: 2025-08-29

## 2025-08-29 RX ADMIN — SODIUM CHLORIDE: 9 INJECTION, SOLUTION INTRAVENOUS at 08:08

## 2025-08-29 RX ADMIN — FERUMOXYTOL 510 MG: 510 INJECTION INTRAVENOUS at 08:08
